# Patient Record
Sex: MALE | Race: BLACK OR AFRICAN AMERICAN | Employment: OTHER | URBAN - METROPOLITAN AREA
[De-identification: names, ages, dates, MRNs, and addresses within clinical notes are randomized per-mention and may not be internally consistent; named-entity substitution may affect disease eponyms.]

---

## 2017-01-08 DIAGNOSIS — K21.9 GASTROESOPHAGEAL REFLUX DISEASE WITHOUT ESOPHAGITIS: ICD-10-CM

## 2017-01-08 DIAGNOSIS — I10 ESSENTIAL HYPERTENSION: ICD-10-CM

## 2017-01-10 RX ORDER — QUINAPRIL 40 MG/1
TABLET ORAL
Qty: 30 TABLET | Refills: 4 | Status: SHIPPED | OUTPATIENT
Start: 2017-01-10 | End: 2018-12-24 | Stop reason: SDUPTHER

## 2017-01-10 RX ORDER — OMEPRAZOLE 20 MG/1
CAPSULE, DELAYED RELEASE ORAL
Qty: 30 CAPSULE | Refills: 4 | Status: SHIPPED | OUTPATIENT
Start: 2017-01-10 | End: 2018-10-30

## 2017-01-10 RX ORDER — AMLODIPINE BESYLATE 5 MG/1
TABLET ORAL
Qty: 30 TABLET | Refills: 4 | Status: SHIPPED | OUTPATIENT
Start: 2017-01-10 | End: 2018-12-24 | Stop reason: SDUPTHER

## 2017-02-07 RX ORDER — BLOOD SUGAR DIAGNOSTIC
STRIP MISCELLANEOUS
Qty: 50 STRIP | Status: SHIPPED | OUTPATIENT
Start: 2017-02-07 | End: 2019-02-13 | Stop reason: SDUPTHER

## 2017-02-11 DIAGNOSIS — E78.2 MIXED HYPERLIPIDEMIA: ICD-10-CM

## 2017-02-13 RX ORDER — SIMVASTATIN 20 MG
TABLET ORAL
Qty: 30 TABLET | Refills: 4 | Status: SHIPPED | OUTPATIENT
Start: 2017-02-13 | End: 2018-12-24 | Stop reason: SDUPTHER

## 2017-04-19 ENCOUNTER — HOSPITAL ENCOUNTER (OUTPATIENT)
Dept: OTHER | Age: 82
Discharge: HOME | End: 2017-04-19
Attending: INTERNAL MEDICINE | Admitting: INTERNAL MEDICINE

## 2017-04-19 LAB
ALBUMIN SERPL-MCNC: 3.8 GM/DL (ref 3.4–5)
ALP BLD-CCNC: 158 IU/L (ref 40–128)
ALT SERPL-CCNC: 10 U/L (ref 10–40)
ANION GAP SERPL CALCULATED.3IONS-SCNC: 15 MMOL/L (ref 4–16)
AST SERPL-CCNC: 20 IU/L (ref 15–37)
BILIRUB SERPL-MCNC: 0.2 MG/DL (ref 0–1)
BUN BLDV-MCNC: 15 MG/DL (ref 6–23)
CALCIUM SERPL-MCNC: 9.3 MG/DL (ref 8.3–10.6)
CEA: 3.8 NG/ML
CHLORIDE BLD-SCNC: 103 MMOL/L (ref 99–110)
CO2: 26 MMOL/L (ref 21–32)
CREAT SERPL-MCNC: 1.5 MG/DL (ref 0.9–1.3)
GFR AFRICAN AMERICAN: 54 ML/MIN/1.73M2
GFR NON-AFRICAN AMERICAN: 45 ML/MIN/1.73M2
GLUCOSE BLD-MCNC: 53 MG/DL (ref 70–140)
POTASSIUM SERPL-SCNC: 5 MMOL/L (ref 3.5–5.1)
SODIUM BLD-SCNC: 144 MMOL/L (ref 135–145)
TOTAL PROTEIN: 7.1 GM/DL (ref 6.4–8.2)

## 2017-05-12 ENCOUNTER — HOSPITAL ENCOUNTER (OUTPATIENT)
Dept: CT IMAGING | Age: 82
Discharge: HOME | End: 2017-05-12
Attending: RADIOLOGY | Admitting: RADIOLOGY

## 2017-05-12 DIAGNOSIS — C34.12 CANCER OF BRONCHUS OF LEFT UPPER LOBE (HCC): ICD-10-CM

## 2017-05-12 DIAGNOSIS — C34.12 MALIGNANT NEOPLASM OF UPPER LOBE, LEFT BRONCHUS OR LUNG (HCC): ICD-10-CM

## 2017-05-12 LAB
GFR AFRICAN AMERICAN: 54 ML/MIN/1.73M2
GFR NON-AFRICAN AMERICAN: 45 ML/MIN/1.73M2
POC CREATININE: 1.5 MG/DL (ref 0.9–1.3)

## 2017-05-12 RX ORDER — 0.9 % SODIUM CHLORIDE 0.9 %
10 VIAL (ML) INJECTION 2 TIMES DAILY
Status: DISCONTINUED | OUTPATIENT
Start: 2017-05-12 | End: 2017-05-13 | Stop reason: HOSPADM

## 2017-09-01 ENCOUNTER — HOSPITAL ENCOUNTER (OUTPATIENT)
Dept: OTHER | Age: 82
Discharge: HOME | End: 2017-09-01
Attending: INTERNAL MEDICINE | Admitting: INTERNAL MEDICINE

## 2017-09-01 LAB
ALBUMIN SERPL-MCNC: 3.9 GM/DL (ref 3.4–5)
ALP BLD-CCNC: 169 IU/L (ref 40–129)
ALT SERPL-CCNC: 11 U/L (ref 10–40)
ANION GAP SERPL CALCULATED.3IONS-SCNC: 11 MMOL/L (ref 4–16)
AST SERPL-CCNC: 17 IU/L (ref 15–37)
BILIRUB SERPL-MCNC: 0.4 MG/DL (ref 0–1)
BUN BLDV-MCNC: 13 MG/DL (ref 6–23)
CALCIUM SERPL-MCNC: 9.5 MG/DL (ref 8.3–10.6)
CHLORIDE BLD-SCNC: 97 MMOL/L (ref 99–110)
CO2: 29 MMOL/L (ref 21–32)
CREAT SERPL-MCNC: 1.4 MG/DL (ref 0.9–1.3)
GFR AFRICAN AMERICAN: 59 ML/MIN/1.73M2
GFR NON-AFRICAN AMERICAN: 49 ML/MIN/1.73M2
GLUCOSE BLD-MCNC: 201 MG/DL (ref 70–140)
LACTATE DEHYDROGENASE: 188 IU/L (ref 120–246)
POTASSIUM SERPL-SCNC: 5 MMOL/L (ref 3.5–5.1)
SODIUM BLD-SCNC: 137 MMOL/L (ref 135–145)
TOTAL PROTEIN: 7.4 GM/DL (ref 6.4–8.2)

## 2017-09-14 ENCOUNTER — HOSPITAL ENCOUNTER (OUTPATIENT)
Dept: CT IMAGING | Age: 82
Discharge: HOME | End: 2017-09-14
Attending: UROLOGY | Admitting: INTERNAL MEDICINE

## 2017-09-14 DIAGNOSIS — R05.9 COUGH: ICD-10-CM

## 2017-09-14 DIAGNOSIS — C34.12 MALIGNANT NEOPLASM OF UPPER LOBE, LEFT BRONCHUS OR LUNG (HCC): ICD-10-CM

## 2017-09-14 DIAGNOSIS — C34.12 CANCER OF BRONCHUS OF LEFT UPPER LOBE (HCC): ICD-10-CM

## 2017-09-14 RX ORDER — 0.9 % SODIUM CHLORIDE 0.9 %
10 VIAL (ML) INJECTION
Status: COMPLETED | OUTPATIENT
Start: 2017-09-14 | End: 2017-09-14

## 2017-09-14 RX ADMIN — Medication 10 ML: at 12:38

## 2017-11-13 ENCOUNTER — HOSPITAL ENCOUNTER (OUTPATIENT)
Dept: CT IMAGING | Age: 82
Discharge: HOME | End: 2017-11-13
Attending: RADIOLOGY | Admitting: RADIOLOGY

## 2017-11-13 DIAGNOSIS — C34.12 MALIGNANT NEOPLASM OF UPPER LOBE, LEFT BRONCHUS OR LUNG (HCC): ICD-10-CM

## 2017-11-13 DIAGNOSIS — C34.12 CANCER OF BRONCHUS OF LEFT UPPER LOBE (HCC): ICD-10-CM

## 2017-11-13 LAB
GFR AFRICAN AMERICAN: >60 ML/MIN/1.73M2
GFR NON-AFRICAN AMERICAN: 57 ML/MIN/1.73M2
POC CREATININE: 1.2 MG/DL (ref 0.9–1.3)

## 2017-11-13 RX ORDER — SODIUM CHLORIDE 0.9 % (FLUSH) 0.9 %
10 SYRINGE (ML) INJECTION ONCE
Status: COMPLETED | OUTPATIENT
Start: 2017-11-13 | End: 2017-11-13

## 2017-11-13 RX ADMIN — Medication 10 ML: at 14:55

## 2017-11-28 ENCOUNTER — HOSPITAL ENCOUNTER (OUTPATIENT)
Dept: OTHER | Age: 82
Discharge: HOME | End: 2017-11-28
Attending: INTERNAL MEDICINE | Admitting: INTERNAL MEDICINE

## 2017-11-28 LAB
ALBUMIN SERPL-MCNC: 3.6 GM/DL (ref 3.4–5)
ALP BLD-CCNC: 140 IU/L (ref 40–128)
ALT SERPL-CCNC: 9 U/L (ref 10–40)
ANION GAP SERPL CALCULATED.3IONS-SCNC: 9 MMOL/L (ref 4–16)
AST SERPL-CCNC: 13 IU/L (ref 15–37)
BILIRUB SERPL-MCNC: 0.4 MG/DL (ref 0–1)
BUN BLDV-MCNC: 16 MG/DL (ref 6–23)
CALCIUM SERPL-MCNC: 9 MG/DL (ref 8.3–10.6)
CHLORIDE BLD-SCNC: 101 MMOL/L (ref 99–110)
CO2: 30 MMOL/L (ref 21–32)
CREAT SERPL-MCNC: 1.5 MG/DL (ref 0.9–1.3)
GFR AFRICAN AMERICAN: 54 ML/MIN/1.73M2
GFR NON-AFRICAN AMERICAN: 45 ML/MIN/1.73M2
GLUCOSE BLD-MCNC: 163 MG/DL (ref 70–99)
POTASSIUM SERPL-SCNC: 4.4 MMOL/L (ref 3.5–5.1)
SODIUM BLD-SCNC: 140 MMOL/L (ref 135–145)
TOTAL PROTEIN: 6.7 GM/DL (ref 6.4–8.2)

## 2018-04-06 ENCOUNTER — HOSPITAL ENCOUNTER (OUTPATIENT)
Dept: GENERAL RADIOLOGY | Age: 83
Discharge: HOME | End: 2018-04-06
Attending: INTERNAL MEDICINE | Admitting: INTERNAL MEDICINE

## 2018-04-06 DIAGNOSIS — C34.90 NON-SMALL CELL LUNG CANCER, UNSPECIFIED LATERALITY (HCC): ICD-10-CM

## 2018-04-06 LAB
ALBUMIN SERPL-MCNC: 4 GM/DL (ref 3.4–5)
ALP BLD-CCNC: 158 IU/L (ref 40–129)
ALT SERPL-CCNC: 11 U/L (ref 10–40)
ANION GAP SERPL CALCULATED.3IONS-SCNC: 9 MMOL/L (ref 4–16)
AST SERPL-CCNC: 15 IU/L (ref 15–37)
BASOPHILS ABSOLUTE: 0.1 K/CU MM
BASOPHILS RELATIVE PERCENT: 1.1 % (ref 0–1)
BILIRUB SERPL-MCNC: 0.3 MG/DL (ref 0–1)
BUN BLDV-MCNC: 25 MG/DL (ref 6–23)
CALCIUM SERPL-MCNC: 9.5 MG/DL (ref 8.3–10.6)
CHLORIDE BLD-SCNC: 99 MMOL/L (ref 99–110)
CO2: 30 MMOL/L (ref 21–32)
CREAT SERPL-MCNC: 1.8 MG/DL (ref 0.9–1.3)
DIFFERENTIAL TYPE: ABNORMAL
EOSINOPHILS ABSOLUTE: 0.4 K/CU MM
EOSINOPHILS RELATIVE PERCENT: 6.9 % (ref 0–3)
GFR AFRICAN AMERICAN: 44 ML/MIN/1.73M2
GFR NON-AFRICAN AMERICAN: 36 ML/MIN/1.73M2
GLUCOSE FASTING: 217 MG/DL (ref 70–99)
HCT VFR BLD CALC: 45 % (ref 42–52)
HEMOGLOBIN: 14.4 GM/DL (ref 13.5–18)
IMMATURE NEUTROPHIL %: 0.5 % (ref 0–0.43)
LYMPHOCYTES ABSOLUTE: 1.2 K/CU MM
LYMPHOCYTES RELATIVE PERCENT: 18.3 % (ref 24–44)
MCH RBC QN AUTO: 28.4 PG (ref 27–31)
MCHC RBC AUTO-ENTMCNC: 32 % (ref 32–36)
MCV RBC AUTO: 88.8 FL (ref 78–100)
MONOCYTES ABSOLUTE: 0.8 K/CU MM
MONOCYTES RELATIVE PERCENT: 11.8 % (ref 0–4)
NUCLEATED RBC %: 0 %
PDW BLD-RTO: 15 % (ref 11.7–14.9)
PLATELET # BLD: 280 K/CU MM (ref 140–440)
PMV BLD AUTO: 10.2 FL (ref 7.5–11.1)
POTASSIUM SERPL-SCNC: 4.7 MMOL/L (ref 3.5–5.1)
RBC # BLD: 5.07 M/CU MM (ref 4.6–6.2)
SEGMENTED NEUTROPHILS ABSOLUTE COUNT: 3.9 K/CU MM
SEGMENTED NEUTROPHILS RELATIVE PERCENT: 61.4 % (ref 36–66)
SODIUM BLD-SCNC: 138 MMOL/L (ref 135–145)
TOTAL IMMATURE NEUTOROPHIL: 0.03 K/CU MM
TOTAL NUCLEATED RBC: 0 K/CU MM
TOTAL PROTEIN: 7.6 GM/DL (ref 6.4–8.2)
WBC # BLD: 6.3 K/CU MM (ref 4–10.5)

## 2018-05-10 ENCOUNTER — HOSPITAL ENCOUNTER (OUTPATIENT)
Dept: CT IMAGING | Age: 83
Discharge: HOME | End: 2018-05-10
Attending: RADIOLOGY | Admitting: RADIOLOGY

## 2018-05-10 DIAGNOSIS — C34.12 CANCER OF BRONCHUS OF LEFT UPPER LOBE (HCC): ICD-10-CM

## 2018-05-10 DIAGNOSIS — C34.12 MALIGNANT NEOPLASM OF UPPER LOBE, LEFT BRONCHUS OR LUNG (HCC): ICD-10-CM

## 2018-05-10 LAB
GFR AFRICAN AMERICAN: 28 ML/MIN/1.73M2
GFR NON-AFRICAN AMERICAN: 23 ML/MIN/1.73M2
POC CREATININE: 2.1 MG/DL (ref 0.9–1.3)

## 2018-10-30 ENCOUNTER — OFFICE VISIT (OUTPATIENT)
Dept: INTERNAL MEDICINE CLINIC | Age: 83
End: 2018-10-30
Payer: MEDICARE

## 2018-10-30 VITALS
RESPIRATION RATE: 18 BRPM | SYSTOLIC BLOOD PRESSURE: 118 MMHG | OXYGEN SATURATION: 97 % | WEIGHT: 147 LBS | DIASTOLIC BLOOD PRESSURE: 64 MMHG | BODY MASS INDEX: 22.35 KG/M2 | HEART RATE: 86 BPM

## 2018-10-30 DIAGNOSIS — C34.92 SQUAMOUS CELL CARCINOMA OF LEFT LUNG (HCC): ICD-10-CM

## 2018-10-30 DIAGNOSIS — E11.22 CONTROLLED TYPE 2 DIABETES MELLITUS WITH STAGE 3 CHRONIC KIDNEY DISEASE, WITH LONG-TERM CURRENT USE OF INSULIN (HCC): ICD-10-CM

## 2018-10-30 DIAGNOSIS — Z79.4 CONTROLLED TYPE 2 DIABETES MELLITUS WITH STAGE 3 CHRONIC KIDNEY DISEASE, WITH LONG-TERM CURRENT USE OF INSULIN (HCC): ICD-10-CM

## 2018-10-30 DIAGNOSIS — E78.00 HYPERCHOLESTEROLEMIA: ICD-10-CM

## 2018-10-30 DIAGNOSIS — N18.30 CONTROLLED TYPE 2 DIABETES MELLITUS WITH STAGE 3 CHRONIC KIDNEY DISEASE, WITH LONG-TERM CURRENT USE OF INSULIN (HCC): ICD-10-CM

## 2018-10-30 DIAGNOSIS — I10 ESSENTIAL HYPERTENSION: Primary | ICD-10-CM

## 2018-10-30 DIAGNOSIS — J43.9 PULMONARY EMPHYSEMA, UNSPECIFIED EMPHYSEMA TYPE (HCC): ICD-10-CM

## 2018-10-30 DIAGNOSIS — I35.0 NONRHEUMATIC AORTIC VALVE STENOSIS: ICD-10-CM

## 2018-10-30 PROCEDURE — 99205 OFFICE O/P NEW HI 60 MIN: CPT | Performed by: INTERNAL MEDICINE

## 2018-10-30 RX ORDER — HYDROCHLOROTHIAZIDE 12.5 MG/1
12.5 CAPSULE, GELATIN COATED ORAL DAILY
COMMUNITY
End: 2018-12-24 | Stop reason: SDUPTHER

## 2018-10-30 RX ORDER — BUDESONIDE AND FORMOTEROL FUMARATE DIHYDRATE 160; 4.5 UG/1; UG/1
2 AEROSOL RESPIRATORY (INHALATION) 2 TIMES DAILY
Qty: 1 INHALER | Refills: 3
Start: 2018-10-30 | End: 2019-04-18 | Stop reason: SDUPTHER

## 2018-10-30 RX ORDER — ALBUTEROL SULFATE 90 UG/1
2 AEROSOL, METERED RESPIRATORY (INHALATION) EVERY 6 HOURS PRN
Qty: 1 INHALER | Refills: 3 | Status: SHIPPED | OUTPATIENT
Start: 2018-10-30 | End: 2019-04-18 | Stop reason: SDUPTHER

## 2018-10-30 RX ORDER — SPIRONOLACTONE 25 MG/1
25 TABLET ORAL DAILY
COMMUNITY
End: 2018-12-24 | Stop reason: SDUPTHER

## 2018-10-30 ASSESSMENT — PATIENT HEALTH QUESTIONNAIRE - PHQ9
2. FEELING DOWN, DEPRESSED OR HOPELESS: 0
SUM OF ALL RESPONSES TO PHQ QUESTIONS 1-9: 0
SUM OF ALL RESPONSES TO PHQ9 QUESTIONS 1 & 2: 0
SUM OF ALL RESPONSES TO PHQ QUESTIONS 1-9: 0
1. LITTLE INTEREST OR PLEASURE IN DOING THINGS: 0

## 2018-11-05 DIAGNOSIS — E11.22 CONTROLLED TYPE 2 DIABETES MELLITUS WITH STAGE 3 CHRONIC KIDNEY DISEASE, WITH LONG-TERM CURRENT USE OF INSULIN (HCC): ICD-10-CM

## 2018-11-05 DIAGNOSIS — N18.30 CONTROLLED TYPE 2 DIABETES MELLITUS WITH STAGE 3 CHRONIC KIDNEY DISEASE, WITH LONG-TERM CURRENT USE OF INSULIN (HCC): ICD-10-CM

## 2018-11-05 DIAGNOSIS — I10 ESSENTIAL HYPERTENSION: ICD-10-CM

## 2018-11-05 DIAGNOSIS — E78.00 HYPERCHOLESTEROLEMIA: ICD-10-CM

## 2018-11-05 DIAGNOSIS — Z79.4 CONTROLLED TYPE 2 DIABETES MELLITUS WITH STAGE 3 CHRONIC KIDNEY DISEASE, WITH LONG-TERM CURRENT USE OF INSULIN (HCC): ICD-10-CM

## 2018-11-05 LAB
A/G RATIO: 1.1 (ref 1.1–2.2)
ALBUMIN SERPL-MCNC: 3.7 G/DL (ref 3.4–5)
ALP BLD-CCNC: 120 U/L (ref 40–129)
ALT SERPL-CCNC: 12 U/L (ref 10–40)
ANION GAP SERPL CALCULATED.3IONS-SCNC: 9 MMOL/L (ref 3–16)
AST SERPL-CCNC: 12 U/L (ref 15–37)
BILIRUB SERPL-MCNC: <0.2 MG/DL (ref 0–1)
BUN BLDV-MCNC: 19 MG/DL (ref 7–20)
CALCIUM SERPL-MCNC: 9.9 MG/DL (ref 8.3–10.6)
CHLORIDE BLD-SCNC: 102 MMOL/L (ref 99–110)
CHOLESTEROL, TOTAL: 121 MG/DL (ref 0–199)
CO2: 30 MMOL/L (ref 21–32)
CREAT SERPL-MCNC: 1.6 MG/DL (ref 0.8–1.3)
CREATININE URINE: 191 MG/DL (ref 39–259)
GFR AFRICAN AMERICAN: 50
GFR NON-AFRICAN AMERICAN: 41
GLOBULIN: 3.5 G/DL
GLUCOSE BLD-MCNC: 246 MG/DL (ref 70–99)
HDLC SERPL-MCNC: 55 MG/DL (ref 40–60)
LDL CHOLESTEROL CALCULATED: 50 MG/DL
MICROALBUMIN UR-MCNC: 31.1 MG/DL
MICROALBUMIN/CREAT UR-RTO: 162.8 MG/G (ref 0–30)
POTASSIUM SERPL-SCNC: 4.9 MMOL/L (ref 3.5–5.1)
SODIUM BLD-SCNC: 141 MMOL/L (ref 136–145)
TOTAL PROTEIN: 7.2 G/DL (ref 6.4–8.2)
TRIGL SERPL-MCNC: 82 MG/DL (ref 0–150)
VLDLC SERPL CALC-MCNC: 16 MG/DL

## 2018-11-06 LAB
BASOPHILS ABSOLUTE: 0 K/UL (ref 0–0.2)
BASOPHILS RELATIVE PERCENT: 0.3 %
EOSINOPHILS ABSOLUTE: 0.1 K/UL (ref 0–0.6)
EOSINOPHILS RELATIVE PERCENT: 2.8 %
ESTIMATED AVERAGE GLUCOSE: 182.9 MG/DL
HBA1C MFR BLD: 8 %
HCT VFR BLD CALC: 45.2 % (ref 40.5–52.5)
HEMOGLOBIN: 14.8 G/DL (ref 13.5–17.5)
LYMPHOCYTES ABSOLUTE: 0.9 K/UL (ref 1–5.1)
LYMPHOCYTES RELATIVE PERCENT: 21.8 %
MCH RBC QN AUTO: 29.8 PG (ref 26–34)
MCHC RBC AUTO-ENTMCNC: 32.7 G/DL (ref 31–36)
MCV RBC AUTO: 90.9 FL (ref 80–100)
MONOCYTES ABSOLUTE: 0.5 K/UL (ref 0–1.3)
MONOCYTES RELATIVE PERCENT: 10.5 %
NEUTROPHILS ABSOLUTE: 2.8 K/UL (ref 1.7–7.7)
NEUTROPHILS RELATIVE PERCENT: 64.6 %
PDW BLD-RTO: 15 % (ref 12.4–15.4)
PLATELET # BLD: 341 K/UL (ref 135–450)
PMV BLD AUTO: 8.8 FL (ref 5–10.5)
RBC # BLD: 4.97 M/UL (ref 4.2–5.9)
WBC # BLD: 4.3 K/UL (ref 4–11)

## 2018-11-09 ENCOUNTER — HOSPITAL ENCOUNTER (INPATIENT)
Age: 83
LOS: 2 days | Discharge: HOME | DRG: 178 | End: 2018-11-12
Attending: EMERGENCY MEDICINE | Admitting: HOSPITALIST
Payer: MEDICARE

## 2018-11-09 ENCOUNTER — HOSPITAL ENCOUNTER (OUTPATIENT)
Dept: CT IMAGING | Age: 83
Discharge: HOME | DRG: 178 | End: 2018-11-09
Payer: MEDICARE

## 2018-11-09 DIAGNOSIS — C34.01 ADENOCARCINOMA OF RIGHT MAIN BRONCHUS (HCC): ICD-10-CM

## 2018-11-09 DIAGNOSIS — J18.9 MULTIFOCAL PNEUMONIA: Primary | ICD-10-CM

## 2018-11-09 DIAGNOSIS — I51.3 ATRIAL THROMBUS: ICD-10-CM

## 2018-11-09 LAB
ALBUMIN SERPL-MCNC: 3.3 GM/DL (ref 3.4–5)
ALP BLD-CCNC: 118 IU/L (ref 40–128)
ALT SERPL-CCNC: 12 U/L (ref 10–40)
ANION GAP SERPL CALCULATED.3IONS-SCNC: 7 MMOL/L (ref 4–16)
APTT: 36.1 SECONDS (ref 21.2–33)
AST SERPL-CCNC: 14 IU/L (ref 15–37)
BASOPHILS ABSOLUTE: 0 K/CU MM
BASOPHILS RELATIVE PERCENT: 0.3 % (ref 0–1)
BILIRUB SERPL-MCNC: 0.2 MG/DL (ref 0–1)
BUN BLDV-MCNC: 22 MG/DL (ref 6–23)
CALCIUM SERPL-MCNC: 9 MG/DL (ref 8.3–10.6)
CHLORIDE BLD-SCNC: 102 MMOL/L (ref 99–110)
CO2: 28 MMOL/L (ref 21–32)
CREAT SERPL-MCNC: 1.8 MG/DL (ref 0.9–1.3)
DIFFERENTIAL TYPE: ABNORMAL
EOSINOPHILS ABSOLUTE: 0.1 K/CU MM
EOSINOPHILS RELATIVE PERCENT: 1.1 % (ref 0–3)
GFR AFRICAN AMERICAN: 44 ML/MIN/1.73M2
GFR NON-AFRICAN AMERICAN: 36 ML/MIN/1.73M2
GLUCOSE BLD-MCNC: 225 MG/DL (ref 70–99)
HCT VFR BLD CALC: 42.6 % (ref 42–52)
HEMOGLOBIN: 13.7 GM/DL (ref 13.5–18)
IMMATURE NEUTROPHIL %: 0.2 % (ref 0–0.43)
INR BLD: 1.06 INDEX
LACTATE: 0.8 MMOL/L (ref 0.4–2)
LYMPHOCYTES ABSOLUTE: 1 K/CU MM
LYMPHOCYTES RELATIVE PERCENT: 11.8 % (ref 24–44)
MCH RBC QN AUTO: 29.5 PG (ref 27–31)
MCHC RBC AUTO-ENTMCNC: 32.2 % (ref 32–36)
MCV RBC AUTO: 91.8 FL (ref 78–100)
MONOCYTES ABSOLUTE: 0.7 K/CU MM
MONOCYTES RELATIVE PERCENT: 8.3 % (ref 0–4)
NUCLEATED RBC %: 0 %
PDW BLD-RTO: 14.8 % (ref 11.7–14.9)
PLATELET # BLD: 273 K/CU MM (ref 140–440)
PMV BLD AUTO: 9.6 FL (ref 7.5–11.1)
POTASSIUM SERPL-SCNC: 4.6 MMOL/L (ref 3.5–5.1)
PRO-BNP: 345 PG/ML
PROTHROMBIN TIME: 12.3 SECONDS (ref 9.12–12.5)
RBC # BLD: 4.64 M/CU MM (ref 4.6–6.2)
SEGMENTED NEUTROPHILS ABSOLUTE COUNT: 6.9 K/CU MM
SEGMENTED NEUTROPHILS RELATIVE PERCENT: 78.3 % (ref 36–66)
SODIUM BLD-SCNC: 137 MMOL/L (ref 135–145)
TOTAL IMMATURE NEUTOROPHIL: 0.02 K/CU MM
TOTAL NUCLEATED RBC: 0 K/CU MM
TOTAL PROTEIN: 7.1 GM/DL (ref 6.4–8.2)
TROPONIN T: <0.01 NG/ML
WBC # BLD: 8.8 K/CU MM (ref 4–10.5)

## 2018-11-09 PROCEDURE — 2580000003 HC RX 258: Performed by: PHYSICIAN ASSISTANT

## 2018-11-09 PROCEDURE — 6360000002 HC RX W HCPCS: Performed by: PHYSICIAN ASSISTANT

## 2018-11-09 PROCEDURE — 93005 ELECTROCARDIOGRAM TRACING: CPT | Performed by: EMERGENCY MEDICINE

## 2018-11-09 PROCEDURE — 36415 COLL VENOUS BLD VENIPUNCTURE: CPT

## 2018-11-09 PROCEDURE — 99284 EMERGENCY DEPT VISIT MOD MDM: CPT

## 2018-11-09 PROCEDURE — 80053 COMPREHEN METABOLIC PANEL: CPT

## 2018-11-09 PROCEDURE — 71260 CT THORAX DX C+: CPT

## 2018-11-09 PROCEDURE — 87040 BLOOD CULTURE FOR BACTERIA: CPT

## 2018-11-09 PROCEDURE — 85730 THROMBOPLASTIN TIME PARTIAL: CPT

## 2018-11-09 PROCEDURE — 84484 ASSAY OF TROPONIN QUANT: CPT

## 2018-11-09 PROCEDURE — 85025 COMPLETE CBC W/AUTO DIFF WBC: CPT

## 2018-11-09 PROCEDURE — 83880 ASSAY OF NATRIURETIC PEPTIDE: CPT

## 2018-11-09 PROCEDURE — 83605 ASSAY OF LACTIC ACID: CPT

## 2018-11-09 PROCEDURE — 6360000004 HC RX CONTRAST MEDICATION: Performed by: INTERNAL MEDICINE

## 2018-11-09 PROCEDURE — 85610 PROTHROMBIN TIME: CPT

## 2018-11-09 RX ORDER — VANCOMYCIN HYDROCHLORIDE 1 G/200ML
1000 INJECTION, SOLUTION INTRAVENOUS ONCE
Status: DISCONTINUED | OUTPATIENT
Start: 2018-11-09 | End: 2018-11-09 | Stop reason: DRUGHIGH

## 2018-11-09 RX ORDER — HEPARIN SODIUM 1000 [USP'U]/ML
80 INJECTION, SOLUTION INTRAVENOUS; SUBCUTANEOUS ONCE
Status: DISCONTINUED | OUTPATIENT
Start: 2018-11-09 | End: 2018-11-09 | Stop reason: DRUGHIGH

## 2018-11-09 RX ORDER — HEPARIN SODIUM 10000 [USP'U]/100ML
18 INJECTION, SOLUTION INTRAVENOUS CONTINUOUS
Status: DISCONTINUED | OUTPATIENT
Start: 2018-11-09 | End: 2018-11-10

## 2018-11-09 RX ORDER — HEPARIN SODIUM 1000 [USP'U]/ML
80 INJECTION, SOLUTION INTRAVENOUS; SUBCUTANEOUS PRN
Status: DISCONTINUED | OUTPATIENT
Start: 2018-11-10 | End: 2018-11-11

## 2018-11-09 RX ORDER — HEPARIN SODIUM 1000 [USP'U]/ML
40 INJECTION, SOLUTION INTRAVENOUS; SUBCUTANEOUS PRN
Status: DISCONTINUED | OUTPATIENT
Start: 2018-11-10 | End: 2018-11-11

## 2018-11-09 RX ADMIN — VANCOMYCIN HYDROCHLORIDE 1250 MG: 5 INJECTION, POWDER, LYOPHILIZED, FOR SOLUTION INTRAVENOUS at 22:59

## 2018-11-09 RX ADMIN — IOPAMIDOL 75 ML: 755 INJECTION, SOLUTION INTRAVENOUS at 15:18

## 2018-11-09 NOTE — ED TRIAGE NOTES
Pt had syncopal episode about 2 weeks ago and Dr Sarmad Duffy sent him for blood work and and out pt CT scan, pt got a call today from the  and was told to come to the ER for a blood clot in his lung

## 2018-11-10 PROBLEM — J18.9 PNEUMONIA: Status: ACTIVE | Noted: 2018-11-10

## 2018-11-10 LAB
ADENOVIRUS DETECTION BY PCR: NOT DETECTED
ANION GAP SERPL CALCULATED.3IONS-SCNC: 9 MMOL/L (ref 4–16)
APTT: 100.7 SECONDS (ref 21.2–33)
APTT: 52.1 SECONDS (ref 21.2–33)
APTT: 81.9 SECONDS (ref 21.2–33)
BORDETELLA PERTUSSIS PCR: NOT DETECTED
BUN BLDV-MCNC: 21 MG/DL (ref 6–23)
CALCIUM SERPL-MCNC: 8.9 MG/DL (ref 8.3–10.6)
CHLAMYDOPHILA PNEUMONIA PCR: NOT DETECTED
CHLORIDE BLD-SCNC: 101 MMOL/L (ref 99–110)
CO2: 27 MMOL/L (ref 21–32)
CORONAVIRUS 229E PCR: NOT DETECTED
CORONAVIRUS HKU1 PCR: NOT DETECTED
CORONAVIRUS NL63 PCR: NOT DETECTED
CORONAVIRUS OC43 PCR: NOT DETECTED
CREAT SERPL-MCNC: 1.6 MG/DL (ref 0.9–1.3)
GFR AFRICAN AMERICAN: 50 ML/MIN/1.73M2
GFR NON-AFRICAN AMERICAN: 41 ML/MIN/1.73M2
GLUCOSE BLD-MCNC: 177 MG/DL (ref 70–99)
HCT VFR BLD CALC: 42.4 % (ref 42–52)
HEMOGLOBIN: 13.8 GM/DL (ref 13.5–18)
HUMAN METAPNEUMOVIRUS PCR: NOT DETECTED
INFLUENZA A BY PCR: NOT DETECTED
INFLUENZA A H1 (2009) PCR: NOT DETECTED
INFLUENZA A H1 PANDEMIC PCR: NOT DETECTED
INFLUENZA A H3 PCR: NOT DETECTED
INFLUENZA B BY PCR: NOT DETECTED
LV EF: 53 %
LVEF MODALITY: NORMAL
MCH RBC QN AUTO: 29.5 PG (ref 27–31)
MCHC RBC AUTO-ENTMCNC: 32.5 % (ref 32–36)
MCV RBC AUTO: 90.6 FL (ref 78–100)
MYCOPLASMA PNEUMONIAE PCR: NOT DETECTED
PARAINFLUENZA 1 PCR: NOT DETECTED
PARAINFLUENZA 2 PCR: NOT DETECTED
PARAINFLUENZA 3 PCR: NOT DETECTED
PARAINFLUENZA 4 PCR: NOT DETECTED
PDW BLD-RTO: 14.8 % (ref 11.7–14.9)
PLATELET # BLD: 275 K/CU MM (ref 140–440)
PMV BLD AUTO: 10.3 FL (ref 7.5–11.1)
POTASSIUM SERPL-SCNC: 4.4 MMOL/L (ref 3.5–5.1)
RBC # BLD: 4.68 M/CU MM (ref 4.6–6.2)
RHINOVIRUS ENTEROVIRUS PCR: NOT DETECTED
RSV PCR: NOT DETECTED
SODIUM BLD-SCNC: 137 MMOL/L (ref 135–145)
WBC # BLD: 8.4 K/CU MM (ref 4–10.5)

## 2018-11-10 PROCEDURE — 6360000002 HC RX W HCPCS: Performed by: INTERNAL MEDICINE

## 2018-11-10 PROCEDURE — 80048 BASIC METABOLIC PNL TOTAL CA: CPT

## 2018-11-10 PROCEDURE — 85027 COMPLETE CBC AUTOMATED: CPT

## 2018-11-10 PROCEDURE — 2500000003 HC RX 250 WO HCPCS: Performed by: HOSPITALIST

## 2018-11-10 PROCEDURE — 1200000000 HC SEMI PRIVATE

## 2018-11-10 PROCEDURE — 6360000002 HC RX W HCPCS: Performed by: HOSPITALIST

## 2018-11-10 PROCEDURE — 94640 AIRWAY INHALATION TREATMENT: CPT

## 2018-11-10 PROCEDURE — 6360000002 HC RX W HCPCS: Performed by: EMERGENCY MEDICINE

## 2018-11-10 PROCEDURE — 85730 THROMBOPLASTIN TIME PARTIAL: CPT

## 2018-11-10 PROCEDURE — 2580000003 HC RX 258: Performed by: HOSPITALIST

## 2018-11-10 PROCEDURE — 99221 1ST HOSP IP/OBS SF/LOW 40: CPT | Performed by: INTERNAL MEDICINE

## 2018-11-10 PROCEDURE — 93010 ELECTROCARDIOGRAM REPORT: CPT | Performed by: INTERNAL MEDICINE

## 2018-11-10 PROCEDURE — 94664 DEMO&/EVAL PT USE INHALER: CPT

## 2018-11-10 PROCEDURE — 87798 DETECT AGENT NOS DNA AMP: CPT

## 2018-11-10 PROCEDURE — 94761 N-INVAS EAR/PLS OXIMETRY MLT: CPT

## 2018-11-10 PROCEDURE — 36415 COLL VENOUS BLD VENIPUNCTURE: CPT

## 2018-11-10 PROCEDURE — 6370000000 HC RX 637 (ALT 250 FOR IP): Performed by: HOSPITALIST

## 2018-11-10 PROCEDURE — 93306 TTE W/DOPPLER COMPLETE: CPT

## 2018-11-10 RX ORDER — ONDANSETRON 2 MG/ML
4 INJECTION INTRAMUSCULAR; INTRAVENOUS EVERY 6 HOURS PRN
Status: DISCONTINUED | OUTPATIENT
Start: 2018-11-10 | End: 2018-11-12 | Stop reason: HOSPADM

## 2018-11-10 RX ORDER — SODIUM CHLORIDE 0.9 % (FLUSH) 0.9 %
10 SYRINGE (ML) INJECTION PRN
Status: DISCONTINUED | OUTPATIENT
Start: 2018-11-10 | End: 2018-11-12 | Stop reason: HOSPADM

## 2018-11-10 RX ORDER — LISINOPRIL 2.5 MG/1
2.5 TABLET ORAL DAILY
Status: DISCONTINUED | OUTPATIENT
Start: 2018-11-10 | End: 2018-11-12 | Stop reason: HOSPADM

## 2018-11-10 RX ORDER — SPIRONOLACTONE 25 MG/1
25 TABLET ORAL DAILY
Status: DISCONTINUED | OUTPATIENT
Start: 2018-11-10 | End: 2018-11-12 | Stop reason: HOSPADM

## 2018-11-10 RX ORDER — HYDROCHLOROTHIAZIDE 12.5 MG/1
12.5 TABLET ORAL DAILY
Status: DISCONTINUED | OUTPATIENT
Start: 2018-11-10 | End: 2018-11-12 | Stop reason: HOSPADM

## 2018-11-10 RX ORDER — AMLODIPINE BESYLATE 5 MG/1
5 TABLET ORAL DAILY
Status: DISCONTINUED | OUTPATIENT
Start: 2018-11-10 | End: 2018-11-12 | Stop reason: HOSPADM

## 2018-11-10 RX ORDER — SIMVASTATIN 20 MG
20 TABLET ORAL NIGHTLY
Status: DISCONTINUED | OUTPATIENT
Start: 2018-11-10 | End: 2018-11-12 | Stop reason: HOSPADM

## 2018-11-10 RX ORDER — HEPARIN SODIUM 5000 [USP'U]/ML
5000 INJECTION, SOLUTION INTRAVENOUS; SUBCUTANEOUS EVERY 8 HOURS SCHEDULED
Status: DISCONTINUED | OUTPATIENT
Start: 2018-11-10 | End: 2018-11-12 | Stop reason: HOSPADM

## 2018-11-10 RX ORDER — SODIUM CHLORIDE 0.9 % (FLUSH) 0.9 %
10 SYRINGE (ML) INJECTION EVERY 12 HOURS SCHEDULED
Status: DISCONTINUED | OUTPATIENT
Start: 2018-11-10 | End: 2018-11-12 | Stop reason: HOSPADM

## 2018-11-10 RX ADMIN — SODIUM CHLORIDE, PRESERVATIVE FREE 10 ML: 5 INJECTION INTRAVENOUS at 20:57

## 2018-11-10 RX ADMIN — SIMVASTATIN 20 MG: 20 TABLET, FILM COATED ORAL at 20:57

## 2018-11-10 RX ADMIN — SPIRONOLACTONE 25 MG: 25 TABLET ORAL at 08:15

## 2018-11-10 RX ADMIN — HEPARIN SODIUM 5000 UNITS: 5000 INJECTION INTRAVENOUS; SUBCUTANEOUS at 17:30

## 2018-11-10 RX ADMIN — VANCOMYCIN HYDROCHLORIDE 750 MG: 5 INJECTION, POWDER, LYOPHILIZED, FOR SOLUTION INTRAVENOUS at 21:02

## 2018-11-10 RX ADMIN — HEPARIN SODIUM AND DEXTROSE 18 UNITS/KG/HR: 10000; 5 INJECTION INTRAVENOUS at 00:05

## 2018-11-10 RX ADMIN — TAZOBACTAM SODIUM AND PIPERACILLIN SODIUM 3.38 G: 375; 3 INJECTION, SOLUTION INTRAVENOUS at 20:57

## 2018-11-10 RX ADMIN — TAZOBACTAM SODIUM AND PIPERACILLIN SODIUM 3.38 G: 375; 3 INJECTION, SOLUTION INTRAVENOUS at 13:43

## 2018-11-10 RX ADMIN — TAZOBACTAM SODIUM AND PIPERACILLIN SODIUM 3.38 G: 375; 3 INJECTION, SOLUTION INTRAVENOUS at 04:43

## 2018-11-10 RX ADMIN — SODIUM CHLORIDE, PRESERVATIVE FREE 10 ML: 5 INJECTION INTRAVENOUS at 08:16

## 2018-11-10 RX ADMIN — AMLODIPINE BESYLATE 5 MG: 5 TABLET ORAL at 08:15

## 2018-11-10 RX ADMIN — LISINOPRIL 2.5 MG: 2.5 TABLET ORAL at 08:16

## 2018-11-10 RX ADMIN — Medication 2 PUFF: at 09:02

## 2018-11-10 RX ADMIN — HYDROCHLOROTHIAZIDE 12.5 MG: 12.5 TABLET ORAL at 08:15

## 2018-11-10 NOTE — CONSULTS
exertional chest pressure/discomfort, edema, syncope  GASTROINTESTINAL:  negative for nausea, vomiting, diarrhea, constipation, blood in stool and abdominal pain  GENITOURINARY:  negative for frequency, dysuria, urinary incontinence, decreased urine volume, and hematuria  HEMATOLOGIC/LYMPHATIC:  negative for easy bruising, bleeding and lymphadenopathy  ALLERGIC/IMMUNOLOGIC:  negative for recurrent infections, angioedema, anaphylaxis and drug reactions  ENDOCRINE:  negative for weight changes and diabetic symptoms including polyuria, polydipsia and polyphagia  MUSCULOSKELETAL:  negative for  pain, joint swelling, decreased range of motion and muscle weakness  NEUROLOGICAL:  negative for headaches, slurred speech, unilateral weakness  PSYCHIATRIC/BEHAVIORAL: negative for hallucinations, behavioral problems, confusion and agitation. Objective:   PHYSICAL EXAM:      VITALS:  /72   Pulse 70   Temp 98 °F (36.7 °C) (Oral)   Resp 22   Ht 5' 8\" (1.727 m)   Wt 137 lb (62.1 kg)   SpO2 94%   BMI 20.83 kg/m²   24HR INTAKE/OUTPUT:    Intake/Output Summary (Last 24 hours) at 11/10/18 1611  Last data filed at 11/10/18 0753   Gross per 24 hour   Intake            96.59 ml   Output              550 ml   Net          -453.41 ml     CURRENT PULSE OXIMETRY:  SpO2: 94 %  24HR PULSE OXIMETRY RANGE:  SpO2  Av.4 %  Min: 94 %  Max: 99 %    CONSTITUTIONAL:  awake, alert, cooperative, no apparent distress, and appears stated age  NECK:  Supple, symmetrical, trachea midline, no adenopathy, thyroid symmetric, not enlarged and no tenderness, skin normal  LUNGS:  Occasional basal crackles  CARDIOVASCULAR:  normal S1 and S2, no edema and no JVD  ABDOMEN:  normal bowel sounds, non-distended and no masses palpated, and no tenderness to palpation. No hepatospleenomegaly  LYMPHADENOPATHY:  no axillary or supraclavicular adenopathy. No cervical adnenopathy  PSYCHIATRIC: Oriented to person place and time.  No obvious depression or

## 2018-11-10 NOTE — ED NOTES
[place], lactic, venous PH, and first set of cultures drawn on PT. SECOND SET NEEDS DRAWN!!      Gianni Mcgill  11/09/18 2042

## 2018-11-10 NOTE — PROGRESS NOTES
Progress Note (Hospitalist, Internal Medicine)  IDENTIFYING INFORMATION   PATIENT:  Fely Rincon  MRN:  [id number]  ADMIT DATE: 11/9/2018  TIME OF EVALUATION: 11/11/2018 8:26 AM      HISTORY OF PRESENT ILLNESS   Patient is an 51-year-old male history of lung cancer status post left-sided lobectomy was sent for outpatient CT by his oncologist during when it was noted he had an atrial thrombus and multifocal pneumonia which he presents to the ER. He reports progressively worsening dyspnea and cough productive of blood stained phlegm. He had no chills or fever. He had no chest pain. For the atrial thrombus, he was started on anticoagulation with IV heparin. For his pneumonia, he was started on both spectrum antibiotics for healthcare associated pneumonia.       SUBJECTIVE     He is feeling fair. Eating lunch w/o issues    MEDICATIONS   Medications Prior to Admission  Prescriptions Prior to Admission: hydrochlorothiazide (MICROZIDE) 12.5 MG capsule, Take 12.5 mg by mouth daily  spironolactone (ALDACTONE) 25 MG tablet, Take 25 mg by mouth daily  budesonide-formoterol (SYMBICORT) 160-4.5 MCG/ACT AERO, Inhale 2 puffs into the lungs 2 times daily  albuterol sulfate HFA (PROAIR HFA) 108 (90 Base) MCG/ACT inhaler, Inhale 2 puffs into the lungs every 6 hours as needed for Wheezing  simvastatin (ZOCOR) 20 MG tablet, TAKE ONE TABLET BY MOUTH DAILY  ACCU-CHEK FRANKY PLUS strip, USE ONE STRIP TO TEST TWICE A DAY  quinapril (ACCUPRIL) 40 MG tablet, TAKE ONE TABLET BY MOUTH EVERY MORNING  amLODIPine (NORVASC) 5 MG tablet, TAKE ONE TABLET BY MOUTH EVERY MORNING  insulin lispro protamine & lispro (HUMALOG MIX 75/25) (75-25) 100 UNIT/ML SUSP injection vial, Use 22 units in the morning with breakfast and 18 units in the evening with dinner.     Current Medications  Current Facility-Administered Medications   Medication Dose Route Frequency Provider Last Rate Last Dose    azithromycin (ZITHROMAX) 500 mg in dextrose 5 % 250 mL adjustment of the   mA/kV was utilized to reduce the radiation dose to as low as reasonably   achievable.       COMPARISON:   Chest CTs dated 05/10/2018, 11/13/2017, and 12/04/2015; thyroid sonogram   02/08/2016       HISTORY:   ORDERING SYSTEM PROVIDED HISTORY: Adenocarcinoma of right main bronchus (HCC)       Subsequent evaluation.       FINDINGS:   MEDIASTINUM:  Normal heart size.  Mild right ventricular hypertrophy.  Mild   to moderate concentric left ventricle hypertrophy.  Filling defect in the   left atrial appendage.  No pericardial effusion.  Minimal coronary   atherosclerotic calcifications.  Minimal systemic atherosclerosis.  Unchanged   mildly enlarged subcarinal lymph node, likely reactive.  No hilar   lymphadenopathy.       LUNGS/PLEURA:  Status post left upper lobectomy.  Secretions in the proximal   right mainstem bronchus and multiple bilateral lower lobe bronchi.  Minimal   to mild bronchial wall thickening with some moderate involvement in the   bilateral lower lobes.  Minimal centrilobular emphysema.  Unchanged 0.5 cm   noncalcified solid nodule the posterior segment of the right upper lobe,   consistent with a benign process such as granulomatous disease given   long-term stability.  Tree-in-bud opacities in the bilateral lower lobes with   new peribronchovascular nodular consolidative opacities.  Minimal gravity   atelectasis in the bilateral lower lobes.  No pleural effusions.       UPPER ABDOMEN:  Normal adrenal glands.  2.0 cm simple cyst in the medial   upper pole of the right kidney (Bosniak category 1).  Tiny sliding hiatal   hernia.       SOFT TISSUES/BONES:   Mildly enlarged, heterogeneous thyroid containing   nodules measuring up to 2.9 cm, previously evaluated sonographically.  No   supraclavicular nor axillary lymphadenopathy.  Normal variant left os   acromiale.  No acute fractures nor suspicious osseous lesions.           Impression   1.  Status post left upper lobectomy with no findings of disease recurrence or   metastatic disease in the chest.   2. Increased infectious bronchiolitis versus aspiration pneumonitis in the   bilateral lower lobes with developing multifocal pneumonia. 3. Patchy bronchial secretions in the bilateral lower lobes with minimal to   moderate bronchial wall thickening, suggesting bronchitis. 4. New filling defect in the left atrial appendage, likely thrombus.  This   could represent a risk for systemic thromboembolism.  Critical results were   called by Dr. Simin Ruvalcaba MD to Dr. Ron Lugo on 11/9/2018 at 16:54.   5. A few additional incidental findings as above for which no follow-up is   recommended.       RECOMMENDATIONS:   Managing Incidental Thyroid Nodule Detected at CT or MRI or US       1.  Further evaluation by thyroid Ultrasound recommended for these incidental   nodules:       Patient Age 28 years or more - Nodule 1.5 cm in size or greater       2. Follow up thyroid ultrasound also recommend in these scenarios       - Heterogeneous, enlarged thyroid gland.       3.  NO further imaging is recommended in the following scenarios       - Any nodule not meeting above criteria.       - Those patients with limited life expectancy or significant comorbidities.       Note: These recommendations do not apply to pts. w/ increased risk for   thyroid cancer or pts. with symptomatic thyroid disease.       Recommendations for f/u of Incidental Thyroid Nodules (ITN) found on CT, MR,   NM and Extrathyroidal US are based upon the ACR white paper and [facility] 3-tiered   system for managing ITNs: J Am David Radiol.  2015 Feb;12(2): 143-50                 Relevant labs and imaging reviewed    ASSESSMENT AND PLAN       Question atrial thrombus  Suspected on CT chest  - card assisting  - TTE no significant findings  - pending ALEX  - now on empiric heparin gtt with APTT monitoring    Bronchiolitis, multi-focal PNA - likely both gram negative and positive  - on admission started on

## 2018-11-10 NOTE — ED PROVIDER NOTES
I independently examined and evaluated Dl Smith. In brief, etiology male who presents with concern for abnormal CT. This was reportedly ordered for occasional dyspnea and recently a cough productive of phlegm with occasional blood. No fever or chills. No reported chest pain. Focused exam revealed the patient appears well-hydrated, well-nourished. Mucous membranes are moist. Speech is clear. Breathing is unlabored. Lungs with occasional rhonchi. Skin is dry. Mental status is normal. The patient moves all extremities and is without facial droop. EKG shows sinus rhythm at 78. Left bundle-branch block noted. Similar morphology from prior tracing. ED course: Patient imaging is reviewed. Labs are stable today. Given the findings on CT, patient is covered for suspected pneumonia. Due to the presence of suspected atrial thrombus, I discussed with the patient risks and benefits of proceeding with anticoagulation until echo can be obtained today for definitive diagnosis. He is agreeable to proceed, and heparin is ordered in the emergency department. We'll plan for admission. All diagnostic, treatment, and disposition decisions were made by myself in conjunction with the advanced practice provider. For all further details of the patient's emergency department visit, please see the advanced practice provider's documentation. Comment: Please note this report has been produced using speech recognition software and may contain errors related to that system including errors in grammar, punctuation, and spelling, as well as words and phrases that may be inappropriate. If there are any questions or concerns please feel free to contact the dictating provider for clarification.        Robles Hooks,   11/09/18 4821

## 2018-11-10 NOTE — CONSULTS
Saurabh Null MD   10 mL at 11/10/18 0816    sodium chloride flush 0.9 % injection 10 mL  10 mL Intravenous PRN Saurabh Null MD        magnesium hydroxide (MILK OF MAGNESIA) 400 MG/5ML suspension 30 mL  30 mL Oral Daily PRN Saurabh Null MD        ondansetron (ZOFRAN) injection 4 mg  4 mg Intravenous Q6H PRN Saurabh Null MD        vancomycin (VANCOCIN) 750 mg in dextrose 5 % 250 mL IVPB  750 mg Intravenous Q24H Saurabh Null MD        piperacillin-tazobactam (ZOSYN) 3.375 g in dextrose 50 mL IVPB extended infusion (premix)  3.375 g Intravenous Q8H Saurabh Null MD   Stopped at 11/10/18 0843    heparin (porcine) injection 5,150 Units  80 Units/kg Intravenous PRN Asael Hood, DO        heparin (porcine) injection 2,580 Units  40 Units/kg Intravenous PRN Asael Hood, DO        heparin 25,000 units in dextrose 5% 250 mL infusion  18 Units/kg/hr Intravenous Continuous Asael Hood,  11.6 mL/hr at 11/10/18 0606 18 Units/kg/hr at 11/10/18 0606     Review of Systems:   · Constitutional: No Fever or Weight Loss   · Eyes: No Decreased Vision  · ENT: No Headaches, Hearing Loss or Vertigo  · Cardiovascular: No chest pain, dyspnea on exertion, palpitations or loss of consciousness  · Respiratory: No cough or wheezing    · Gastrointestinal: No abdominal pain, appetite loss, blood in stools, constipation, diarrhea or heartburn  · Genitourinary: No dysuria, trouble voiding, or hematuria  · Musculoskeletal:  No gait disturbance, weakness or joint complaints  · Integumentary: No rash or pruritis  · Neurological: No TIA or stroke symptoms  · Psychiatric: No anxiety or depression  · Endocrine: No malaise, fatigue or temperature intolerance  · Hematologic/Lymphatic: No bleeding problems, blood clots or swollen lymph nodes  · Allergic/Immunologic: No nasal congestion or hives  ·   ·      Physical Examination:    Vitals:    11/10/18 0445   BP: (!) 142/76   Pulse: 66   Resp: 26   Temp: 98.1 °F (36.7 °C)   SpO2: 95% thrombus        Relevant Medications    heparin (porcine) injection 5,150 Units    heparin (porcine) injection 2,580 Units (Start on 11/10/2018  8:00 PM)    heparin 25,000 units in dextrose 5% 250 mL infusion    amLODIPine (NORVASC) tablet 5 mg    hydrochlorothiazide (HYDRODIURIL) tablet 12.5 mg    lisinopril (PRINIVIL;ZESTRIL) tablet 2.5 mg    simvastatin (ZOCOR) tablet 20 mg (Start on 11/10/2018  9:00 PM)    spironolactone (ALDACTONE) tablet 25 mg      1   Atrial  Thrombus    With  No  documented  Atrial  arrhythmia      Will  Place  On  Tele  .   Will  Need  Wei  For  Further  Evaluation  2  Moderate  Aortic  Stenosis  Echo  2016  Need  Fu  Evaluation  3  Sob  Secondary  To  Pneumonia  And    Decreased  Lung  Volume ( lobectemy)  Clinically  Not  In  chf  4  htn  5  Dm    Managed  By  primary  Recommendations:   Anticoagulation  In  Progress  Baseline  Echo,  Will  Need  WEI     Carlton Metzger MD, 11/10/2018 9:02 AM

## 2018-11-10 NOTE — ED PROVIDER NOTES
Denies sore throat or ear pain   Cardiovascular:  Denies chest pain, palpitations   + syncope  Respiratory:  Denies shortness of breath    GI:  Denies abdominal pain, nausea, vomiting, or diarrhea  :  Denies any urinary symptoms    Musculoskeletal:  Denies back pain,   Skin:  Denies rash  Neurologic:  Denies headache, focal weakness or sensory changes   Endocrine:  Denies polyuria or polydypsia   Lymphatic:  Denies swollen glands     All other review of systems are negative  See HPI and nursing notes for additional information     PAST MEDICAL AND SURGICAL HISTORY    Past Medical History:   Diagnosis Date    CAD (coronary artery disease)     Dr. Abiola Martínez of left lung ([facility] [address] 3/21/2016    Dr. Cole Mcghee Diabetes mellitus [facility]) Dx 1970's    type II    Heart murmur, systolic     [place] (hard of hearing)     Bilateral Ears    Hyperlipidemia     Hypertension     follow with Dr Gopal Carson Dx 12-31-15    Sees Dr. Cole Mcghee Lung mass     scheduled for lung bx 12/31/2015/ Dr. Chavez Villalpando Teeth missing     Upper And Lower    Thyroid disease     CT Guided Biopsy Left Thyroid Mass Done 3-4-16    Wears glasses      Past Surgical History:   Procedure Laterality Date    CARDIAC CATHETERIZATION      per old chart pt had cardiac cath and right femoral artery angiogram done 11/2008    DENTAL SURGERY      Teeth Extracted In Past    LUNG SURGERY Left 03/16/2016    Robotic assisted thorocotomy, DARLENE, node sampling and right supraclavicular lymph node biopsy    OTHER SURGICAL HISTORY  3-4-16    CT Guided Biopsy Left Thyroid Mass    OTHER SURGICAL HISTORY  12-31-15     CT Guided Lung Biopsy Done 12-31-15    TONSILLECTOMY  1960's       CURRENT MEDICATIONS    Current Outpatient Rx   Medication Sig Dispense Refill    hydrochlorothiazide (MICROZIDE) 12.5 MG capsule Take 12.5 mg by mouth daily      spironolactone (ALDACTONE) 25 MG tablet Take 25 mg by mouth daily      budesonide-formoterol (SYMBICORT) Nucleated RBC % 0.0 %    Total Nucleated RBC 0.0 K/CU MM    Total Immature Neutrophil 0.02 K/CU MM    Immature Neutrophil % 0.2 0 - 0.43 %   Comprehensive Metabolic Panel w/ Reflex to MG   Result Value Ref Range    Sodium 137 135 - 145 MMOL/L    Potassium 4.6 3.5 - 5.1 MMOL/L    Chloride 102 99 - 110 mMol/L    CO2 28 21 - 32 MMOL/L    BUN 22 6 - 23 MG/DL    CREATININE 1.8 (H) 0.9 - 1.3 MG/DL    Glucose 225 (H) 70 - 99 MG/DL    Calcium 9.0 8.3 - 10.6 MG/DL    Alb 3.3 (L) 3.4 - 5.0 GM/DL    Total Protein 7.1 6.4 - 8.2 GM/DL    Total Bilirubin 0.2 0.0 - 1.0 MG/DL    ALT 12 10 - 40 U/L    AST 14 (L) 15 - 37 IU/L    Alkaline Phosphatase 118 40 - 128 IU/L    GFR Non- 36 (L) >60 mL/min/1.73m2    GFR  44 (L) >60 mL/min/1.73m2    Anion Gap 7 4 - 16   Lactic Acid, Plasma   Result Value Ref Range    Lactate 0.8 0.4 - 2.0 mMOL/L   Protime/INR & PTT   Result Value Ref Range    Protime 12.3 9.12 - 12.5 SECONDS    INR 1.06 INDEX    aPTT 36.1 (H) 21.2 - 33.0 SECONDS   Troponin   Result Value Ref Range    Troponin T <0.010 <0.01 NG/ML   Brain Natriuretic Peptide   Result Value Ref Range    Pro-.0 (H) <300 PG/ML   CBC   Result Value Ref Range    WBC 8.4 4.0 - 10.5 K/CU MM    RBC 4.68 4.6 - 6.2 M/CU MM    Hemoglobin 13.8 13.5 - 18.0 GM/DL    Hematocrit 42.4 42 - 52 %    MCV 90.6 78 - 100 FL    MCH 29.5 27 - 31 PG    MCHC 32.5 32.0 - 36.0 %    RDW 14.8 11.7 - 14.9 %    Platelets 255 789 - 125 K/CU MM    MPV 10.3 7.5 - 11.1 FL   APTT   Result Value Ref Range    aPTT 81.9 (H) 21.2 - 33.0 SECONDS   EKG 12 Lead   Result Value Ref Range    Ventricular Rate 78 BPM    Atrial Rate 78 BPM    P-R Interval 166 ms    QRS Duration 136 ms    Q-T Interval 414 ms    QTc Calculation (Bazett) 471 ms    P Axis 90 degrees    R Axis 15 degrees    T Axis 107 degrees    Diagnosis       Normal sinus rhythm  Left bundle branch block  Abnormal ECG  No previous ECGs available             EKG    See attending note    ED

## 2018-11-10 NOTE — H&P
affect/speech. No agitation  Eyes - Eye lids intact. No scleral icterus  ENT - Lips wnl. External ear clear/dry/intact. No thyromegaly on inspection  Neuro - No gross peripheral or central neuro deficits on inspection  Heart - Sinus. RRR. S1 and S2 present. No added HS/murmurs appreciated. No elevated JVD appreciated  Lung - Adequate air entry b/l, Bilateral crackles/wheezes appreciated  GI - Soft. No guarding/rigidity. No hepatosplenomegaly/ascites. BS+  Skin - Intact. No rash/petechiae/ecchymosis. Warm extremities  MSK - Joints with normal ROM. No joint swellings    Lines/Drains/Airways/Wounds:  [unfilled]    LABS AND IMAGING   CBC  [unfilled]    Last 3 Hemoglobin  Lab Results   Component Value Date    HGB 13.7 11/09/2018    HGB 14.8 11/05/2018    HGB 14.4 04/06/2018     Last 3 WBC/ANC  Lab Results   Component Value Date    WBC 8.8 11/09/2018    WBC 4.3 11/05/2018    WBC 6.3 04/06/2018     No components found for: GRNLOCTYABS  Last 3 Platelets  No results found for: PLATELET  Chemistry  [unfilled]  [unfilled]  Lab Results   Component Value Date     09/01/2017     08/11/2016     06/15/2016     Coagulation Studies  Lab Results   Component Value Date    INR 1.06 11/09/2018     Liver Function Studies  Lab Results   Component Value Date    ALT 12 11/09/2018    AST 14 11/09/2018    ALKPHOS 118 11/09/2018       Recent Imaging        Relevant labs and imaging reviewed    ASSESSMENT AND PLAN     Atrial thrombosis  Continue IV heparin  2-D echo in the a.m.   Cardiology consult    Pneumonia  Continue vancomycin and Zosyn  Follow-up culture results    DM type II  Sliding scale insulin with Lantus  Fingersticks blood sugar monitoring  Diabetic diet    Hypertension  Increase amlodipine to 10 mg by mouth daily  Hold lisinopril and hydrochlorothiazide due to worsening renal function    Case d/w ED physician    Hospitalist, Internal Medicine  11/10/2018 at 1:10 AM

## 2018-11-11 LAB
ANION GAP SERPL CALCULATED.3IONS-SCNC: 7 MMOL/L (ref 4–16)
APTT: 35 SECONDS (ref 21.2–33)
APTT: 36.2 SECONDS (ref 21.2–33)
APTT: 36.8 SECONDS (ref 21.2–33)
APTT: 36.9 SECONDS (ref 21.2–33)
BASOPHILS ABSOLUTE: 0 K/CU MM
BASOPHILS RELATIVE PERCENT: 0.6 % (ref 0–1)
BUN BLDV-MCNC: 24 MG/DL (ref 6–23)
CALCIUM SERPL-MCNC: 8.9 MG/DL (ref 8.3–10.6)
CHLORIDE BLD-SCNC: 99 MMOL/L (ref 99–110)
CO2: 27 MMOL/L (ref 21–32)
CREAT SERPL-MCNC: 1.7 MG/DL (ref 0.9–1.3)
DIFFERENTIAL TYPE: ABNORMAL
EOSINOPHILS ABSOLUTE: 0.2 K/CU MM
EOSINOPHILS RELATIVE PERCENT: 3.2 % (ref 0–3)
GFR AFRICAN AMERICAN: 47 ML/MIN/1.73M2
GFR NON-AFRICAN AMERICAN: 39 ML/MIN/1.73M2
GLUCOSE BLD-MCNC: 251 MG/DL (ref 70–99)
HCT VFR BLD CALC: 43.7 % (ref 42–52)
HEMOGLOBIN: 13.7 GM/DL (ref 13.5–18)
IMMATURE NEUTROPHIL %: 0.4 % (ref 0–0.43)
LEGIONELLA URINARY AG: NEGATIVE
LYMPHOCYTES ABSOLUTE: 1.2 K/CU MM
LYMPHOCYTES RELATIVE PERCENT: 22.8 % (ref 24–44)
MCH RBC QN AUTO: 28.8 PG (ref 27–31)
MCHC RBC AUTO-ENTMCNC: 31.4 % (ref 32–36)
MCV RBC AUTO: 91.8 FL (ref 78–100)
MONOCYTES ABSOLUTE: 0.7 K/CU MM
MONOCYTES RELATIVE PERCENT: 13.4 % (ref 0–4)
NUCLEATED RBC %: 0 %
PDW BLD-RTO: 14.7 % (ref 11.7–14.9)
PLATELET # BLD: 254 K/CU MM (ref 140–440)
PMV BLD AUTO: 9.9 FL (ref 7.5–11.1)
POTASSIUM SERPL-SCNC: 4.6 MMOL/L (ref 3.5–5.1)
RBC # BLD: 4.76 M/CU MM (ref 4.6–6.2)
SEGMENTED NEUTROPHILS ABSOLUTE COUNT: 3.2 K/CU MM
SEGMENTED NEUTROPHILS RELATIVE PERCENT: 59.6 % (ref 36–66)
SODIUM BLD-SCNC: 133 MMOL/L (ref 135–145)
STREP PNEUMONIAE ANTIGEN: NORMAL
TOTAL IMMATURE NEUTOROPHIL: 0.02 K/CU MM
TOTAL NUCLEATED RBC: 0 K/CU MM
WBC # BLD: 5.3 K/CU MM (ref 4–10.5)

## 2018-11-11 PROCEDURE — 6360000002 HC RX W HCPCS: Performed by: INTERNAL MEDICINE

## 2018-11-11 PROCEDURE — 2580000003 HC RX 258: Performed by: HOSPITALIST

## 2018-11-11 PROCEDURE — 87449 NOS EACH ORGANISM AG IA: CPT

## 2018-11-11 PROCEDURE — 87077 CULTURE AEROBIC IDENTIFY: CPT

## 2018-11-11 PROCEDURE — 87070 CULTURE OTHR SPECIMN AEROBIC: CPT

## 2018-11-11 PROCEDURE — 1200000000 HC SEMI PRIVATE

## 2018-11-11 PROCEDURE — 85025 COMPLETE CBC W/AUTO DIFF WBC: CPT

## 2018-11-11 PROCEDURE — 99232 SBSQ HOSP IP/OBS MODERATE 35: CPT | Performed by: INTERNAL MEDICINE

## 2018-11-11 PROCEDURE — 87186 SC STD MICRODIL/AGAR DIL: CPT

## 2018-11-11 PROCEDURE — 87205 SMEAR GRAM STAIN: CPT

## 2018-11-11 PROCEDURE — 2580000003 HC RX 258: Performed by: INTERNAL MEDICINE

## 2018-11-11 PROCEDURE — 2500000003 HC RX 250 WO HCPCS: Performed by: HOSPITALIST

## 2018-11-11 PROCEDURE — 80048 BASIC METABOLIC PNL TOTAL CA: CPT

## 2018-11-11 PROCEDURE — 6370000000 HC RX 637 (ALT 250 FOR IP): Performed by: HOSPITALIST

## 2018-11-11 PROCEDURE — 94761 N-INVAS EAR/PLS OXIMETRY MLT: CPT

## 2018-11-11 PROCEDURE — 87899 AGENT NOS ASSAY W/OPTIC: CPT

## 2018-11-11 PROCEDURE — 94640 AIRWAY INHALATION TREATMENT: CPT

## 2018-11-11 PROCEDURE — 36415 COLL VENOUS BLD VENIPUNCTURE: CPT

## 2018-11-11 RX ADMIN — Medication 2 PUFF: at 07:47

## 2018-11-11 RX ADMIN — SODIUM CHLORIDE, PRESERVATIVE FREE 10 ML: 5 INJECTION INTRAVENOUS at 09:01

## 2018-11-11 RX ADMIN — HYDROCHLOROTHIAZIDE 12.5 MG: 12.5 TABLET ORAL at 09:00

## 2018-11-11 RX ADMIN — TAZOBACTAM SODIUM AND PIPERACILLIN SODIUM 3.38 G: 375; 3 INJECTION, SOLUTION INTRAVENOUS at 05:56

## 2018-11-11 RX ADMIN — SPIRONOLACTONE 25 MG: 25 TABLET ORAL at 09:00

## 2018-11-11 RX ADMIN — AMLODIPINE BESYLATE 5 MG: 5 TABLET ORAL at 09:00

## 2018-11-11 RX ADMIN — SIMVASTATIN 20 MG: 20 TABLET, FILM COATED ORAL at 21:00

## 2018-11-11 RX ADMIN — Medication 2 PUFF: at 22:51

## 2018-11-11 RX ADMIN — HEPARIN SODIUM 5000 UNITS: 5000 INJECTION INTRAVENOUS; SUBCUTANEOUS at 05:56

## 2018-11-11 RX ADMIN — SODIUM CHLORIDE, PRESERVATIVE FREE 10 ML: 5 INJECTION INTRAVENOUS at 21:01

## 2018-11-11 RX ADMIN — SODIUM CHLORIDE, PRESERVATIVE FREE 10 ML: 5 INJECTION INTRAVENOUS at 09:09

## 2018-11-11 RX ADMIN — TAZOBACTAM SODIUM AND PIPERACILLIN SODIUM 3.38 G: 375; 3 INJECTION, SOLUTION INTRAVENOUS at 13:34

## 2018-11-11 RX ADMIN — AZITHROMYCIN MONOHYDRATE 500 MG: 500 INJECTION, POWDER, LYOPHILIZED, FOR SOLUTION INTRAVENOUS at 09:51

## 2018-11-11 RX ADMIN — TAZOBACTAM SODIUM AND PIPERACILLIN SODIUM 3.38 G: 375; 3 INJECTION, SOLUTION INTRAVENOUS at 21:00

## 2018-11-11 RX ADMIN — HEPARIN SODIUM 5000 UNITS: 5000 INJECTION INTRAVENOUS; SUBCUTANEOUS at 13:34

## 2018-11-11 RX ADMIN — LISINOPRIL 2.5 MG: 2.5 TABLET ORAL at 09:00

## 2018-11-11 ASSESSMENT — PAIN SCALES - GENERAL
PAINLEVEL_OUTOF10: 0

## 2018-11-11 NOTE — PLAN OF CARE
Problem: Discharge Planning:  Goal: Discharged to appropriate level of care  Discharged to appropriate level of care   Outcome: Ongoing    Goal: Participates in care planning  Participates in care planning   Outcome: Ongoing      Problem: Airway Clearance - Ineffective:  Goal: Clear lung sounds  Clear lung sounds   Outcome: Ongoing    Goal: Ability to maintain a clear airway will improve  Ability to maintain a clear airway will improve   Outcome: Ongoing      Problem: Fluid Volume - Deficit:  Goal: Achieves intake and output within specified parameters  Achieves intake and output within specified parameters   Outcome: Ongoing      Problem: Falls - Risk of:  Goal: Will remain free from falls  Will remain free from falls    Outcome: Ongoing    Goal: Absence of physical injury  Absence of physical injury    Outcome: Ongoing

## 2018-11-11 NOTE — PROGRESS NOTES
Progress Note (Hospitalist, Internal Medicine)  IDENTIFYING INFORMATION   PATIENT:  Halle Ernst  MRN:  [id number]  ADMIT DATE: 11/9/2018  TIME OF EVALUATION: 11/11/2018 1:47 PM      HISTORY OF PRESENT ILLNESS   Patient is an 51-year-old male history of lung cancer status post left-sided lobectomy was sent for outpatient CT by his oncologist during when it was noted he had an atrial thrombus and multifocal pneumonia which he presents to the ER. He reports progressively worsening dyspnea and cough productive of blood stained phlegm. He had no chills or fever. He had no chest pain. For the atrial thrombus, he was started on anticoagulation with IV heparin. For his pneumonia, he was started on both spectrum antibiotics for healthcare associated pneumonia.       SUBJECTIVE     Continues to feel better. No needs today. Awaiting ALEX tomorrow    MEDICATIONS   Medications Prior to Admission  Prescriptions Prior to Admission: hydrochlorothiazide (MICROZIDE) 12.5 MG capsule, Take 12.5 mg by mouth daily  spironolactone (ALDACTONE) 25 MG tablet, Take 25 mg by mouth daily  budesonide-formoterol (SYMBICORT) 160-4.5 MCG/ACT AERO, Inhale 2 puffs into the lungs 2 times daily  albuterol sulfate HFA (PROAIR HFA) 108 (90 Base) MCG/ACT inhaler, Inhale 2 puffs into the lungs every 6 hours as needed for Wheezing  simvastatin (ZOCOR) 20 MG tablet, TAKE ONE TABLET BY MOUTH DAILY  ACCU-CHEK FRANKY PLUS strip, USE ONE STRIP TO TEST TWICE A DAY  quinapril (ACCUPRIL) 40 MG tablet, TAKE ONE TABLET BY MOUTH EVERY MORNING  amLODIPine (NORVASC) 5 MG tablet, TAKE ONE TABLET BY MOUTH EVERY MORNING  insulin lispro protamine & lispro (HUMALOG MIX 75/25) (75-25) 100 UNIT/ML SUSP injection vial, Use 22 units in the morning with breakfast and 18 units in the evening with dinner.     Current Medications  Current Facility-Administered Medications   Medication Dose Route Frequency Provider Last Rate Last Dose    azithromycin (ZITHROMAX) 500 mg in

## 2018-11-11 NOTE — PROGRESS NOTES
Pulmonary and Critical Care  Progress Note      VITALS:  /71   Pulse 64   Temp 97.5 °F (36.4 °C) (Oral)   Resp 16   Ht 5' 8\" (1.727 m)   Wt 137 lb (62.1 kg)   SpO2 96%   BMI 20.83 kg/m²     Subjective:   CHIEF COMPLAINT :SOB     HPI:                The patient is a 80 y.o. male with significant past medical history of COPD and lung s/p left lobectomy  presents with complaints of SOB. At this time he is being treated with the Abx and nebs. He is lying in the bed. He is not in acute resp distress. Objective:   PHYSICAL EXAM:    LUNGS:Occasional basal crackles  Abd- soft, BS+,NT  Ext - no pedal edema  CVS- s1s2, no murmurs      DATA:    CBC:  Recent Labs      11/09/18   2037  11/10/18   0428  11/11/18   0445   WBC  8.8  8.4  5.3   RBC  4.64  4.68  4.76   HGB  13.7  13.8  13.7   HCT  42.6  42.4  43.7   PLT  273  275  254   MCV  91.8  90.6  91.8   MCH  29.5  29.5  28.8   MCHC  32.2  32.5  31.4*   RDW  14.8  14.8  14.7   SEGSPCT  78.3*   --   59.6      BMP:  Recent Labs      11/09/18   2037  11/10/18   0428  11/11/18   0445   NA  137  137  133*   K  4.6  4.4  4.6   CL  102  101  99   CO2  28  27  27   BUN  22  21  24*   CREATININE  1.8*  1.6*  1.7*   CALCIUM  9.0  8.9  8.9   GLUCOSE  225*  177*  251*      ABG:  No results for input(s): PH, PO2ART, CLE5FRV, HCO3, BEART, O2SAT in the last 72 hours. BNP  No results found for: BNP   D-Dimer:  No results found for: DDIMER   1. Radiology: None      Assessment/Plan     Patient Active Problem List    Diagnosis Date Noted    Pneumonia 11/10/2018    Multifocal pneumonia     Essential hypertension 10/30/2018    Hypercholesterolemia 10/30/2018    Controlled type 2 diabetes mellitus with stage 3 chronic kidney disease, with long-term current use of insulin ([facility] [address] 10/30/2018    Pulmonary emphysema ([address] 10/30/2018    Nonrheumatic aortic valve stenosis 10/30/2018    Squamous cell carcinoma of left lung ([facility] 10/30/2018          1. C/w nesb  2. C/w abx  3.  F/u

## 2018-11-12 VITALS
TEMPERATURE: 97 F | OXYGEN SATURATION: 97 % | HEART RATE: 65 BPM | DIASTOLIC BLOOD PRESSURE: 64 MMHG | HEIGHT: 68 IN | SYSTOLIC BLOOD PRESSURE: 107 MMHG | RESPIRATION RATE: 20 BRPM | WEIGHT: 137 LBS | BODY MASS INDEX: 20.76 KG/M2

## 2018-11-12 LAB
ANION GAP SERPL CALCULATED.3IONS-SCNC: 9 MMOL/L (ref 4–16)
APTT: 33.7 SECONDS (ref 21.2–33)
APTT: 36.2 SECONDS (ref 21.2–33)
BASOPHILS ABSOLUTE: 0 K/CU MM
BASOPHILS RELATIVE PERCENT: 0.8 % (ref 0–1)
BUN BLDV-MCNC: 24 MG/DL (ref 6–23)
CALCIUM SERPL-MCNC: 8.8 MG/DL (ref 8.3–10.6)
CHLORIDE BLD-SCNC: 101 MMOL/L (ref 99–110)
CO2: 25 MMOL/L (ref 21–32)
CREAT SERPL-MCNC: 1.6 MG/DL (ref 0.9–1.3)
DIFFERENTIAL TYPE: ABNORMAL
EOSINOPHILS ABSOLUTE: 0.2 K/CU MM
EOSINOPHILS RELATIVE PERCENT: 3 % (ref 0–3)
GFR AFRICAN AMERICAN: 50 ML/MIN/1.73M2
GFR NON-AFRICAN AMERICAN: 41 ML/MIN/1.73M2
GLUCOSE BLD-MCNC: 221 MG/DL (ref 70–99)
GLUCOSE BLD-MCNC: 301 MG/DL (ref 70–99)
HCT VFR BLD CALC: 43.7 % (ref 42–52)
HEMOGLOBIN: 13.9 GM/DL (ref 13.5–18)
IMMATURE NEUTROPHIL %: 0.2 % (ref 0–0.43)
LV EF: 53 %
LVEF MODALITY: NORMAL
LYMPHOCYTES ABSOLUTE: 1.1 K/CU MM
LYMPHOCYTES RELATIVE PERCENT: 20.1 % (ref 24–44)
MCH RBC QN AUTO: 29 PG (ref 27–31)
MCHC RBC AUTO-ENTMCNC: 31.8 % (ref 32–36)
MCV RBC AUTO: 91 FL (ref 78–100)
MONOCYTES ABSOLUTE: 0.6 K/CU MM
MONOCYTES RELATIVE PERCENT: 11.8 % (ref 0–4)
NUCLEATED RBC %: 0 %
PDW BLD-RTO: 14.6 % (ref 11.7–14.9)
PLATELET # BLD: 240 K/CU MM (ref 140–440)
PMV BLD AUTO: 9.8 FL (ref 7.5–11.1)
POTASSIUM SERPL-SCNC: 4.7 MMOL/L (ref 3.5–5.1)
RBC # BLD: 4.8 M/CU MM (ref 4.6–6.2)
SEGMENTED NEUTROPHILS ABSOLUTE COUNT: 3.4 K/CU MM
SEGMENTED NEUTROPHILS RELATIVE PERCENT: 64.1 % (ref 36–66)
SODIUM BLD-SCNC: 135 MMOL/L (ref 135–145)
TOTAL IMMATURE NEUTOROPHIL: 0.01 K/CU MM
TOTAL NUCLEATED RBC: 0 K/CU MM
WBC # BLD: 5.3 K/CU MM (ref 4–10.5)

## 2018-11-12 PROCEDURE — 7100000001 HC PACU RECOVERY - ADDTL 15 MIN

## 2018-11-12 PROCEDURE — 82962 GLUCOSE BLOOD TEST: CPT

## 2018-11-12 PROCEDURE — 2500000003 HC RX 250 WO HCPCS: Performed by: HOSPITALIST

## 2018-11-12 PROCEDURE — 7100000000 HC PACU RECOVERY - FIRST 15 MIN

## 2018-11-12 PROCEDURE — 6370000000 HC RX 637 (ALT 250 FOR IP): Performed by: INTERNAL MEDICINE

## 2018-11-12 PROCEDURE — 94640 AIRWAY INHALATION TREATMENT: CPT

## 2018-11-12 PROCEDURE — 94761 N-INVAS EAR/PLS OXIMETRY MLT: CPT

## 2018-11-12 PROCEDURE — 36415 COLL VENOUS BLD VENIPUNCTURE: CPT

## 2018-11-12 PROCEDURE — 85730 THROMBOPLASTIN TIME PARTIAL: CPT

## 2018-11-12 PROCEDURE — B24BZZ4 ULTRASONOGRAPHY OF HEART WITH AORTA, TRANSESOPHAGEAL: ICD-10-PCS | Performed by: INTERNAL MEDICINE

## 2018-11-12 PROCEDURE — 93312 ECHO TRANSESOPHAGEAL: CPT

## 2018-11-12 PROCEDURE — 2580000003 HC RX 258: Performed by: HOSPITALIST

## 2018-11-12 PROCEDURE — 85025 COMPLETE CBC W/AUTO DIFF WBC: CPT

## 2018-11-12 PROCEDURE — 80048 BASIC METABOLIC PNL TOTAL CA: CPT

## 2018-11-12 RX ORDER — DEXTROSE MONOHYDRATE 50 MG/ML
100 INJECTION, SOLUTION INTRAVENOUS PRN
Status: DISCONTINUED | OUTPATIENT
Start: 2018-11-12 | End: 2018-11-12 | Stop reason: HOSPADM

## 2018-11-12 RX ORDER — DEXTROSE MONOHYDRATE 25 G/50ML
12.5 INJECTION, SOLUTION INTRAVENOUS PRN
Status: DISCONTINUED | OUTPATIENT
Start: 2018-11-12 | End: 2018-11-12 | Stop reason: HOSPADM

## 2018-11-12 RX ORDER — NICOTINE POLACRILEX 4 MG
15 LOZENGE BUCCAL PRN
Status: DISCONTINUED | OUTPATIENT
Start: 2018-11-12 | End: 2018-11-12 | Stop reason: HOSPADM

## 2018-11-12 RX ORDER — LEVOFLOXACIN 500 MG/1
500 TABLET, FILM COATED ORAL DAILY
Qty: 7 TABLET | Refills: 0 | Status: SHIPPED | OUTPATIENT
Start: 2018-11-12 | End: 2018-11-19

## 2018-11-12 RX ADMIN — Medication 2 PUFF: at 08:10

## 2018-11-12 RX ADMIN — SODIUM CHLORIDE, PRESERVATIVE FREE 10 ML: 5 INJECTION INTRAVENOUS at 08:05

## 2018-11-12 RX ADMIN — TAZOBACTAM SODIUM AND PIPERACILLIN SODIUM 3.38 G: 375; 3 INJECTION, SOLUTION INTRAVENOUS at 12:50

## 2018-11-12 RX ADMIN — TAZOBACTAM SODIUM AND PIPERACILLIN SODIUM 3.38 G: 375; 3 INJECTION, SOLUTION INTRAVENOUS at 05:26

## 2018-11-12 RX ADMIN — INSULIN LISPRO 4 UNITS: 100 INJECTION, SOLUTION INTRAVENOUS; SUBCUTANEOUS at 14:52

## 2018-11-12 ASSESSMENT — PAIN SCALES - GENERAL: PAINLEVEL_OUTOF10: 0

## 2018-11-12 NOTE — CARE COORDINATION
CM into see pt to initiate a safe discharge plan. Cm into see, introduced self and explained role of CM. Pt is alone in room. Pt is kind, alert and oriented. Pt lives alone in elderly apartments. Pt is well supported by his dtr. Pt uses no DME. Pt is able to drive, has a truck. Pt has a new PCP across from his apartments but is unable to remember name. Pt provided information for PCP and health resource center. Pt has insurance and prescriptions are covered. Pt shared that he walks to the grocery. Pt meals prepared for him by a friend. Pt plans to return home at time of discharge. pt denies any needs and his dtr will provide transportation. CM updated white board and encouraged to call for any needs or concern. CM is available if any needs arise.    [address]
Navigator or Home Health Nurse if have signs and symptoms:  Shortness of breath,thicker/colored phlegm,  using rescue inhalers or nebulizer more than normal,  fatigued, unable to do usual activities,   fever,chills & sweating,   sleeping poorly,  dry cough , headache       Symptoms may indicate you may need to adjust your medications  Limit your activities  Make sure you are taking medications correctly  Force fluids  Take medications for your fever      Call Physician immediately or 911 if have symptoms:  Unrelieved shortness of breath  Shortness of breath at rest  Fever over 101  Confusion  Sharp/stabbing pain in chest worse with deep breaths and cough  Severe to increased coughing  Unusual fatigue  Unable to stay awake  Bluish color to lips or fingernails    Verified if patient had received flu or Pneumonia vaccines previously. Teachback received from patient/family. CN contact information given.      PNEUMONIA CHECKLIST    Was the PNA Order set used      No  Pneumonia Stoplight Education    Yes  Antibiotic given within 24 hours    Yes 11/9 2045  Blood cultures drawn before antibiotic given   Yes 11/9 2006  O2 Saturation on admission     97% RA      Consults:             Smoking Cessation      NA  Pulmonary       Yes  Med Assist Consult      No  Home Health Care Consult     No  Palliative Care Consult     No  Respiratory Consult      Yes  (including bedside instructions on nebulizers, inhalers, and assessment of oxygen and equipment needs at home)      Discharge:             Pneumococcal Vaccine given before discharge  Yes  Flu Vaccine given before discharge    Yes  Inhalers       No  Spacer        NA  Steroids       No  Follow-up appointment scheduled within 5-7 days  Yes  Cardio/Pulmonary Wellness program consult  NA  Home Oxygen       No  Neblizer, bipap, cpap at home    No  Home air quality assessment [facility]

## 2018-11-12 NOTE — PROGRESS NOTES
Stopped at 11/12/18 0807    heparin (porcine) injection 5,000 Units  5,000 Units Subcutaneous 3 times per day Mari Velez MD   Stopped at 11/11/18 2103           Labs:  CBC with Differential:    Lab Results   Component Value Date    WBC 5.3 11/12/2018    RBC 4.80 11/12/2018    HGB 13.9 11/12/2018    HCT 43.7 11/12/2018     11/12/2018    MCV 91.0 11/12/2018    MCH 29.0 11/12/2018    MCHC 31.8 11/12/2018    RDW 14.6 11/12/2018    SEGSPCT 64.1 11/12/2018    LYMPHOPCT 20.1 11/12/2018    MONOPCT 11.8 11/12/2018    EOSPCT 3 01/21/2016    BASOPCT 0.8 11/12/2018    MONOSABS 0.6 11/12/2018    LYMPHSABS 1.1 11/12/2018    EOSABS 0.2 11/12/2018    BASOSABS 0.0 11/12/2018    DIFFTYPE AUTOMATED DIFFERENTIAL 11/12/2018     CMP:    Lab Results   Component Value Date     11/12/2018    K 4.7 11/12/2018     11/12/2018    CO2 25 11/12/2018    BUN 24 11/12/2018    CREATININE 1.6 11/12/2018    GFRAA 50 11/12/2018    AGRATIO 1.1 11/05/2018    LABGLOM 41 11/12/2018    GLUCOSE 301 11/12/2018    PROT 7.1 11/09/2018    LABALBU 3.3 11/09/2018    CALCIUM 8.8 11/12/2018    BILITOT 0.2 11/09/2018    ALKPHOS 118 11/09/2018    AST 14 11/09/2018    ALT 12 11/09/2018     Hepatic Function Panel:    Lab Results   Component Value Date    ALKPHOS 118 11/09/2018    ALT 12 11/09/2018    AST 14 11/09/2018    PROT 7.1 11/09/2018    BILITOT 0.2 11/09/2018    LABALBU 3.3 11/09/2018     Magnesium:    Lab Results   Component Value Date    MG 1.9 06/15/2016     PT/INR:    Lab Results   Component Value Date    PROTIME 12.3 11/09/2018    INR 1.06 11/09/2018     Last 3 Troponin:  No results found for: TROPONINI  U/A:    Lab Results   Component Value Date    COLORU YELLOW 03/08/2016    WBCUA 2 03/08/2016    RBCUA 1 03/08/2016    MUCUS RARE 03/08/2016    BACTERIA RARE 03/08/2016    CLARITYU CLEAR 03/08/2016    SPECGRAV 1.015 03/08/2016    LEUKOCYTESUR SMALL 03/08/2016    UROBILINOGEN 0.2 03/08/2016    BILIRUBINUR NEGATIVE 03/08/2016

## 2018-11-12 NOTE — DISCHARGE INSTR - OTHER ORDERS
Follow-up With  Details  Why  Contact Info   Yahir Woods PA-C  Go on 11/14/2018  follow up with PCP for post hospitalization care go @ 08:30 AM  [address] [facility] [address]. Vesta Blevins MD  Go on 12/3/2018  follow up @ 12:15 PM  [address]  [phone]   La Nena Rubin MD  Go on 11/19/2018  to resume follow up surveillance of lung cancer go @ 09:00 AM  [address] [facility]  [phone]

## 2018-11-12 NOTE — DISCHARGE SUMMARY
Guido Labrum 1935 [id number]  PCP:  Suyapa Davey PA-C    Admit date: 11/9/2018  Admitting Physician: Gayle Rhodes MD    Discharge date: 11/12/2018 Discharge Physician: Remy De La Cruz MD      Reason for admission:   Chief Complaint   Patient presents with    Other     had out pt CT of his chest today and was sent in for a new blood clot      Present on Admission:   Pneumonia   Multifocal pneumonia       Discharge Diagnoses & Hospital Course[de-identified]       Question atrial thrombus  Suspected on CT chest  - card assisting  - TTE no significant findings  - completed ALEX today w/o overt findings. The initial CT scan read was an over-call likely caused by artifact as likely reason for initial suspicion for blood clot    Bronchiolitis, multi-focal PNA - likely both gram negative and positive  - on admission started on IV vanc, zosyn as empiric therapy  - add azithromycin  - d/c vanco since no evidence of HCAP  - send serologies negative  - home to complete course of PO levaquin  - known to Dr Jose Hogan - f/u outpatient     CKD  CKD stage 3 estimated     DMII  - resume home insulin     HTN  - restarted home med       Exam:   Wt Readings from Last 3 Encounters:   11/10/18 137 lb (62.1 kg)   10/30/18 147 lb (66.7 kg)   06/14/16 138 lb (62.6 kg)       Blood pressure 100/64, pulse 70, temperature 98.1 °F (36.7 °C), temperature source Oral, resp. rate 25, height 5' 8\" (1.727 m), weight 137 lb (62.1 kg), SpO2 100 %. General - AAO x 3  Psych - Appropriate affect/speech. No agitation  Eyes - Eye lids intact. No scleral icterus  Heart - Sinus. RRR. S1 and S2 present. No added HS/murmurs appreciated. No elevated JVD appreciated. Lung - Adequate air entry b/l, No overt crackles/wheezes appreciated  GI - Soft, non-tender. No guarding/rigidity. No hepatosplenomegaly/ascities.  BS+   - No CVA/suprapubic tenderness or palpable bladder distension    Significant Diagnostic Studies:   CBC:   Recent Labs      11/10/18   0428

## 2018-11-13 LAB
EKG ATRIAL RATE: 78 BPM
EKG DIAGNOSIS: NORMAL
EKG P AXIS: 90 DEGREES
EKG P-R INTERVAL: 166 MS
EKG Q-T INTERVAL: 414 MS
EKG QRS DURATION: 136 MS
EKG QTC CALCULATION (BAZETT): 471 MS
EKG R AXIS: 15 DEGREES
EKG T AXIS: 107 DEGREES
EKG VENTRICULAR RATE: 78 BPM

## 2018-11-14 LAB
CULTURE: NORMAL
CULTURE: NORMAL
Lab: NORMAL
Lab: NORMAL
REPORT STATUS: NORMAL
REPORT STATUS: NORMAL
SPECIMEN: NORMAL
SPECIMEN: NORMAL

## 2018-11-16 LAB
CULTURE: NORMAL
GRAM SMEAR: NORMAL
Lab: NORMAL
ORGANISM: NORMAL
ORGANISM: NORMAL
REPORT STATUS: NORMAL
SPECIMEN: NORMAL

## 2018-12-24 DIAGNOSIS — K21.9 GASTROESOPHAGEAL REFLUX DISEASE WITHOUT ESOPHAGITIS: ICD-10-CM

## 2018-12-24 DIAGNOSIS — E78.2 MIXED HYPERLIPIDEMIA: ICD-10-CM

## 2018-12-24 DIAGNOSIS — I10 ESSENTIAL HYPERTENSION: ICD-10-CM

## 2018-12-26 RX ORDER — HYDROCHLOROTHIAZIDE 12.5 MG/1
12.5 CAPSULE, GELATIN COATED ORAL DAILY
Qty: 30 CAPSULE | Refills: 5 | Status: SHIPPED | OUTPATIENT
Start: 2018-12-26 | End: 2019-04-18 | Stop reason: SDUPTHER

## 2018-12-26 RX ORDER — QUINAPRIL 40 MG/1
TABLET ORAL
Qty: 30 TABLET | Refills: 5 | Status: SHIPPED | OUTPATIENT
Start: 2018-12-26 | End: 2019-04-18 | Stop reason: SDUPTHER

## 2018-12-26 RX ORDER — AMLODIPINE BESYLATE 5 MG/1
TABLET ORAL
Qty: 30 TABLET | Refills: 5 | Status: SHIPPED | OUTPATIENT
Start: 2018-12-26 | End: 2019-04-18 | Stop reason: SDUPTHER

## 2018-12-26 RX ORDER — SPIRONOLACTONE 25 MG/1
25 TABLET ORAL DAILY
Qty: 30 TABLET | Refills: 5 | Status: SHIPPED | OUTPATIENT
Start: 2018-12-26 | End: 2019-04-18 | Stop reason: SDUPTHER

## 2018-12-26 RX ORDER — OMEPRAZOLE 20 MG/1
20 CAPSULE, DELAYED RELEASE ORAL DAILY
Qty: 30 CAPSULE | Refills: 5 | Status: SHIPPED | OUTPATIENT
Start: 2018-12-26 | End: 2019-04-18 | Stop reason: SDUPTHER

## 2018-12-26 RX ORDER — SIMVASTATIN 20 MG
TABLET ORAL
Qty: 30 TABLET | Refills: 5 | Status: SHIPPED | OUTPATIENT
Start: 2018-12-26 | End: 2019-04-18 | Stop reason: SDUPTHER

## 2019-02-11 DIAGNOSIS — Z79.4 TYPE 2 DIABETES MELLITUS WITHOUT COMPLICATION, WITH LONG-TERM CURRENT USE OF INSULIN (HCC): ICD-10-CM

## 2019-02-11 DIAGNOSIS — E11.9 TYPE 2 DIABETES MELLITUS WITHOUT COMPLICATION, WITH LONG-TERM CURRENT USE OF INSULIN (HCC): ICD-10-CM

## 2019-02-11 DIAGNOSIS — K21.9 GASTROESOPHAGEAL REFLUX DISEASE WITHOUT ESOPHAGITIS: ICD-10-CM

## 2019-02-11 DIAGNOSIS — J43.9 PULMONARY EMPHYSEMA, UNSPECIFIED EMPHYSEMA TYPE (HCC): ICD-10-CM

## 2019-02-11 DIAGNOSIS — I10 ESSENTIAL HYPERTENSION: ICD-10-CM

## 2019-02-11 DIAGNOSIS — E78.2 MIXED HYPERLIPIDEMIA: ICD-10-CM

## 2019-02-11 RX ORDER — AMLODIPINE BESYLATE 5 MG/1
TABLET ORAL
Qty: 30 TABLET | Refills: 5 | OUTPATIENT
Start: 2019-02-11

## 2019-02-11 RX ORDER — OMEPRAZOLE 20 MG/1
20 CAPSULE, DELAYED RELEASE ORAL DAILY
Qty: 30 CAPSULE | Refills: 5 | OUTPATIENT
Start: 2019-02-11

## 2019-02-11 RX ORDER — QUINAPRIL 40 MG/1
TABLET ORAL
Qty: 30 TABLET | Refills: 5 | OUTPATIENT
Start: 2019-02-11

## 2019-02-11 RX ORDER — BUDESONIDE AND FORMOTEROL FUMARATE DIHYDRATE 160; 4.5 UG/1; UG/1
2 AEROSOL RESPIRATORY (INHALATION) 2 TIMES DAILY
Qty: 1 INHALER | Refills: 3 | OUTPATIENT
Start: 2019-02-11

## 2019-02-11 RX ORDER — SPIRONOLACTONE 25 MG/1
25 TABLET ORAL DAILY
Qty: 30 TABLET | Refills: 5 | OUTPATIENT
Start: 2019-02-11

## 2019-02-11 RX ORDER — SIMVASTATIN 20 MG
TABLET ORAL
Qty: 30 TABLET | Refills: 5 | OUTPATIENT
Start: 2019-02-11

## 2019-02-11 RX ORDER — HYDROCHLOROTHIAZIDE 12.5 MG/1
12.5 CAPSULE, GELATIN COATED ORAL DAILY
Qty: 30 CAPSULE | Refills: 5 | OUTPATIENT
Start: 2019-02-11

## 2019-02-11 RX ORDER — ALBUTEROL SULFATE 90 UG/1
2 AEROSOL, METERED RESPIRATORY (INHALATION) EVERY 6 HOURS PRN
Qty: 1 INHALER | Refills: 3 | OUTPATIENT
Start: 2019-02-11

## 2019-02-13 ENCOUNTER — TELEPHONE (OUTPATIENT)
Dept: INTERNAL MEDICINE CLINIC | Age: 84
End: 2019-02-13

## 2019-04-18 ENCOUNTER — OFFICE VISIT (OUTPATIENT)
Dept: INTERNAL MEDICINE CLINIC | Age: 84
End: 2019-04-18
Payer: MEDICARE

## 2019-04-18 VITALS
OXYGEN SATURATION: 94 % | SYSTOLIC BLOOD PRESSURE: 138 MMHG | BODY MASS INDEX: 21.41 KG/M2 | WEIGHT: 140.8 LBS | RESPIRATION RATE: 18 BRPM | DIASTOLIC BLOOD PRESSURE: 68 MMHG | HEART RATE: 99 BPM

## 2019-04-18 DIAGNOSIS — K21.9 GASTROESOPHAGEAL REFLUX DISEASE WITHOUT ESOPHAGITIS: ICD-10-CM

## 2019-04-18 DIAGNOSIS — I10 ESSENTIAL HYPERTENSION: ICD-10-CM

## 2019-04-18 DIAGNOSIS — Z79.4 CONTROLLED TYPE 2 DIABETES MELLITUS WITH STAGE 3 CHRONIC KIDNEY DISEASE, WITH LONG-TERM CURRENT USE OF INSULIN (HCC): ICD-10-CM

## 2019-04-18 DIAGNOSIS — E78.2 MIXED HYPERLIPIDEMIA: ICD-10-CM

## 2019-04-18 DIAGNOSIS — N18.30 CONTROLLED TYPE 2 DIABETES MELLITUS WITH STAGE 3 CHRONIC KIDNEY DISEASE, WITH LONG-TERM CURRENT USE OF INSULIN (HCC): ICD-10-CM

## 2019-04-18 DIAGNOSIS — J43.9 PULMONARY EMPHYSEMA, UNSPECIFIED EMPHYSEMA TYPE (HCC): ICD-10-CM

## 2019-04-18 DIAGNOSIS — E11.22 CONTROLLED TYPE 2 DIABETES MELLITUS WITH STAGE 3 CHRONIC KIDNEY DISEASE, WITH LONG-TERM CURRENT USE OF INSULIN (HCC): ICD-10-CM

## 2019-04-18 DIAGNOSIS — R04.2 HEMOPTYSIS: Primary | ICD-10-CM

## 2019-04-18 PROCEDURE — 99214 OFFICE O/P EST MOD 30 MIN: CPT | Performed by: INTERNAL MEDICINE

## 2019-04-18 RX ORDER — ALBUTEROL SULFATE 90 UG/1
2 AEROSOL, METERED RESPIRATORY (INHALATION) EVERY 6 HOURS PRN
Qty: 1 INHALER | Refills: 3 | Status: SHIPPED | OUTPATIENT
Start: 2019-04-18 | End: 2019-08-12 | Stop reason: SDUPTHER

## 2019-04-18 RX ORDER — QUINAPRIL 40 MG/1
TABLET ORAL
Qty: 30 TABLET | Refills: 5 | Status: SHIPPED | OUTPATIENT
Start: 2019-04-18 | End: 2019-05-31 | Stop reason: SDUPTHER

## 2019-04-18 RX ORDER — BUDESONIDE AND FORMOTEROL FUMARATE DIHYDRATE 160; 4.5 UG/1; UG/1
2 AEROSOL RESPIRATORY (INHALATION) 2 TIMES DAILY
Qty: 1 INHALER | Refills: 3 | Status: SHIPPED | OUTPATIENT
Start: 2019-04-18 | End: 2019-08-12 | Stop reason: SDUPTHER

## 2019-04-18 RX ORDER — SPIRONOLACTONE 25 MG/1
25 TABLET ORAL DAILY
Qty: 30 TABLET | Refills: 5 | Status: SHIPPED | OUTPATIENT
Start: 2019-04-18 | End: 2019-08-12 | Stop reason: SDUPTHER

## 2019-04-18 RX ORDER — SIMVASTATIN 20 MG
TABLET ORAL
Qty: 30 TABLET | Refills: 5 | Status: SHIPPED | OUTPATIENT
Start: 2019-04-18 | End: 2019-04-25

## 2019-04-18 RX ORDER — OMEPRAZOLE 20 MG/1
20 CAPSULE, DELAYED RELEASE ORAL DAILY
Qty: 30 CAPSULE | Refills: 5 | Status: SHIPPED | OUTPATIENT
Start: 2019-04-18 | End: 2019-08-12 | Stop reason: SDUPTHER

## 2019-04-18 RX ORDER — AMLODIPINE BESYLATE 5 MG/1
TABLET ORAL
Qty: 30 TABLET | Refills: 5 | Status: SHIPPED | OUTPATIENT
Start: 2019-04-18 | End: 2019-08-12 | Stop reason: SDUPTHER

## 2019-04-18 RX ORDER — HYDROCHLOROTHIAZIDE 12.5 MG/1
12.5 CAPSULE, GELATIN COATED ORAL DAILY
Qty: 30 CAPSULE | Refills: 5 | Status: SHIPPED | OUTPATIENT
Start: 2019-04-18 | End: 2019-08-12 | Stop reason: SDUPTHER

## 2019-04-18 ASSESSMENT — PATIENT HEALTH QUESTIONNAIRE - PHQ9
2. FEELING DOWN, DEPRESSED OR HOPELESS: 0
SUM OF ALL RESPONSES TO PHQ9 QUESTIONS 1 & 2: 0
SUM OF ALL RESPONSES TO PHQ QUESTIONS 1-9: 0
SUM OF ALL RESPONSES TO PHQ QUESTIONS 1-9: 0
1. LITTLE INTEREST OR PLEASURE IN DOING THINGS: 0

## 2019-04-23 DIAGNOSIS — Z79.4 CONTROLLED TYPE 2 DIABETES MELLITUS WITH STAGE 3 CHRONIC KIDNEY DISEASE, WITH LONG-TERM CURRENT USE OF INSULIN (HCC): ICD-10-CM

## 2019-04-23 DIAGNOSIS — E78.2 MIXED HYPERLIPIDEMIA: ICD-10-CM

## 2019-04-23 DIAGNOSIS — I10 ESSENTIAL HYPERTENSION: ICD-10-CM

## 2019-04-23 DIAGNOSIS — N18.30 CONTROLLED TYPE 2 DIABETES MELLITUS WITH STAGE 3 CHRONIC KIDNEY DISEASE, WITH LONG-TERM CURRENT USE OF INSULIN (HCC): ICD-10-CM

## 2019-04-23 DIAGNOSIS — E11.22 CONTROLLED TYPE 2 DIABETES MELLITUS WITH STAGE 3 CHRONIC KIDNEY DISEASE, WITH LONG-TERM CURRENT USE OF INSULIN (HCC): ICD-10-CM

## 2019-04-23 DIAGNOSIS — R04.2 HEMOPTYSIS: ICD-10-CM

## 2019-04-23 LAB
A/G RATIO: 1 (ref 1.1–2.2)
ALBUMIN SERPL-MCNC: 3.8 G/DL (ref 3.4–5)
ALP BLD-CCNC: 124 U/L (ref 40–129)
ALT SERPL-CCNC: 13 U/L (ref 10–40)
ANION GAP SERPL CALCULATED.3IONS-SCNC: 11 MMOL/L (ref 3–16)
AST SERPL-CCNC: 12 U/L (ref 15–37)
BASOPHILS ABSOLUTE: 0 K/UL (ref 0–0.2)
BASOPHILS RELATIVE PERCENT: 0.8 %
BILIRUB SERPL-MCNC: 0.3 MG/DL (ref 0–1)
BUN BLDV-MCNC: 25 MG/DL (ref 7–20)
CALCIUM SERPL-MCNC: 9.7 MG/DL (ref 8.3–10.6)
CHLORIDE BLD-SCNC: 102 MMOL/L (ref 99–110)
CHOLESTEROL, TOTAL: 134 MG/DL (ref 0–199)
CO2: 28 MMOL/L (ref 21–32)
CREAT SERPL-MCNC: 1.9 MG/DL (ref 0.8–1.3)
D DIMER: 629 NG/ML DDU (ref 0–229)
EOSINOPHILS ABSOLUTE: 0.1 K/UL (ref 0–0.6)
EOSINOPHILS RELATIVE PERCENT: 2.4 %
GFR AFRICAN AMERICAN: 41
GFR NON-AFRICAN AMERICAN: 34
GLOBULIN: 3.7 G/DL
GLUCOSE BLD-MCNC: 167 MG/DL (ref 70–99)
HCT VFR BLD CALC: 47.5 % (ref 40.5–52.5)
HDLC SERPL-MCNC: 60 MG/DL (ref 40–60)
HEMOGLOBIN: 15.4 G/DL (ref 13.5–17.5)
LDL CHOLESTEROL CALCULATED: 57 MG/DL
LYMPHOCYTES ABSOLUTE: 1 K/UL (ref 1–5.1)
LYMPHOCYTES RELATIVE PERCENT: 18.6 %
MCH RBC QN AUTO: 29.3 PG (ref 26–34)
MCHC RBC AUTO-ENTMCNC: 32.4 G/DL (ref 31–36)
MCV RBC AUTO: 90.4 FL (ref 80–100)
MONOCYTES ABSOLUTE: 0.5 K/UL (ref 0–1.3)
MONOCYTES RELATIVE PERCENT: 10.2 %
NEUTROPHILS ABSOLUTE: 3.5 K/UL (ref 1.7–7.7)
NEUTROPHILS RELATIVE PERCENT: 68 %
PDW BLD-RTO: 15.4 % (ref 12.4–15.4)
PLATELET # BLD: 300 K/UL (ref 135–450)
PMV BLD AUTO: 8.8 FL (ref 5–10.5)
POTASSIUM SERPL-SCNC: 4.7 MMOL/L (ref 3.5–5.1)
RBC # BLD: 5.25 M/UL (ref 4.2–5.9)
SODIUM BLD-SCNC: 141 MMOL/L (ref 136–145)
TOTAL PROTEIN: 7.5 G/DL (ref 6.4–8.2)
TRIGL SERPL-MCNC: 87 MG/DL (ref 0–150)
VLDLC SERPL CALC-MCNC: 17 MG/DL
WBC # BLD: 5.1 K/UL (ref 4–11)

## 2019-04-24 DIAGNOSIS — R79.89 ELEVATED D-DIMER: Primary | ICD-10-CM

## 2019-04-24 LAB
ESTIMATED AVERAGE GLUCOSE: 188.6 MG/DL
HBA1C MFR BLD: 8.2 %

## 2019-04-25 ENCOUNTER — HOSPITAL ENCOUNTER (OUTPATIENT)
Dept: GENERAL RADIOLOGY | Age: 84
Discharge: HOME | End: 2019-04-25
Payer: MEDICARE

## 2019-04-25 ENCOUNTER — HOSPITAL ENCOUNTER (OUTPATIENT)
Dept: NUCLEAR MEDICINE | Age: 84
Discharge: HOME | End: 2019-04-25
Payer: MEDICARE

## 2019-04-25 ENCOUNTER — OFFICE VISIT (OUTPATIENT)
Dept: INTERNAL MEDICINE CLINIC | Age: 84
End: 2019-04-25
Payer: MEDICARE

## 2019-04-25 ENCOUNTER — HOSPITAL ENCOUNTER (OUTPATIENT)
Age: 84
Discharge: HOME | End: 2019-04-25
Payer: MEDICARE

## 2019-04-25 VITALS
HEART RATE: 69 BPM | DIASTOLIC BLOOD PRESSURE: 74 MMHG | OXYGEN SATURATION: 95 % | RESPIRATION RATE: 18 BRPM | SYSTOLIC BLOOD PRESSURE: 128 MMHG

## 2019-04-25 DIAGNOSIS — R79.89 ELEVATED D-DIMER: ICD-10-CM

## 2019-04-25 DIAGNOSIS — R04.2 HEMOPTYSIS: Primary | ICD-10-CM

## 2019-04-25 PROCEDURE — 3430000000 HC RX DIAGNOSTIC RADIOPHARMACEUTICAL: Performed by: INTERNAL MEDICINE

## 2019-04-25 PROCEDURE — A9540 TC99M MAA: HCPCS | Performed by: INTERNAL MEDICINE

## 2019-04-25 PROCEDURE — 71046 X-RAY EXAM CHEST 2 VIEWS: CPT

## 2019-04-25 PROCEDURE — A9539 TC99M PENTETATE: HCPCS | Performed by: INTERNAL MEDICINE

## 2019-04-25 PROCEDURE — 78582 LUNG VENTILAT&PERFUS IMAGING: CPT

## 2019-04-25 PROCEDURE — 99213 OFFICE O/P EST LOW 20 MIN: CPT | Performed by: INTERNAL MEDICINE

## 2019-04-25 RX ORDER — KIT FOR THE PREPARATION OF TECHNETIUM TC 99M PENTETATE 20 MG/1
3 INJECTION, POWDER, LYOPHILIZED, FOR SOLUTION INTRAVENOUS; RESPIRATORY (INHALATION)
Status: COMPLETED | OUTPATIENT
Start: 2019-04-25 | End: 2019-04-25

## 2019-04-25 RX ADMIN — Medication 5 MILLICURIE: at 09:50

## 2019-04-25 RX ADMIN — KIT FOR THE PREPARATION OF TECHNETIUM TC 99M PENTETATE 3 MILLICURIE: 20 INJECTION, POWDER, LYOPHILIZED, FOR SOLUTION INTRAVENOUS; RESPIRATORY (INHALATION) at 09:35

## 2019-04-26 DIAGNOSIS — E11.9 TYPE 2 DIABETES MELLITUS WITHOUT COMPLICATION, WITH LONG-TERM CURRENT USE OF INSULIN (HCC): ICD-10-CM

## 2019-04-26 DIAGNOSIS — Z79.4 TYPE 2 DIABETES MELLITUS WITHOUT COMPLICATION, WITH LONG-TERM CURRENT USE OF INSULIN (HCC): ICD-10-CM

## 2019-05-20 ENCOUNTER — HOSPITAL ENCOUNTER (OUTPATIENT)
Dept: CT IMAGING | Age: 84
Discharge: HOME | End: 2019-05-20
Payer: MEDICARE

## 2019-05-20 DIAGNOSIS — C34.12 CANCER OF BRONCHUS OF LEFT UPPER LOBE (HCC): ICD-10-CM

## 2019-05-20 LAB
GFR AFRICAN AMERICAN: 38 ML/MIN/1.73M2
GFR NON-AFRICAN AMERICAN: 31 ML/MIN/1.73M2
POC CREATININE: 2.1 MG/DL (ref 0.9–1.3)

## 2019-05-20 PROCEDURE — 6360000004 HC RX CONTRAST MEDICATION: Performed by: INTERNAL MEDICINE

## 2019-05-20 PROCEDURE — 71250 CT THORAX DX C-: CPT

## 2019-05-31 DIAGNOSIS — I10 ESSENTIAL HYPERTENSION: ICD-10-CM

## 2019-05-31 DIAGNOSIS — E78.2 MIXED HYPERLIPIDEMIA: ICD-10-CM

## 2019-05-31 RX ORDER — SIMVASTATIN 20 MG
20 TABLET ORAL NIGHTLY
Qty: 90 TABLET | Refills: 3 | Status: CANCELLED | OUTPATIENT
Start: 2019-05-31

## 2019-05-31 RX ORDER — QUINAPRIL 40 MG/1
40 TABLET ORAL DAILY
Qty: 90 TABLET | Refills: 3 | Status: SHIPPED | OUTPATIENT
Start: 2019-05-31 | End: 2020-01-27 | Stop reason: SDUPTHER

## 2019-06-18 ENCOUNTER — OFFICE VISIT (OUTPATIENT)
Dept: INTERNAL MEDICINE CLINIC | Age: 84
End: 2019-06-18
Payer: MEDICARE

## 2019-06-18 VITALS
DIASTOLIC BLOOD PRESSURE: 68 MMHG | OXYGEN SATURATION: 97 % | RESPIRATION RATE: 16 BRPM | HEART RATE: 74 BPM | BODY MASS INDEX: 21.59 KG/M2 | WEIGHT: 142 LBS | SYSTOLIC BLOOD PRESSURE: 130 MMHG

## 2019-06-18 DIAGNOSIS — L25.9 CONTACT DERMATITIS, UNSPECIFIED CONTACT DERMATITIS TYPE, UNSPECIFIED TRIGGER: Primary | ICD-10-CM

## 2019-06-18 PROCEDURE — 99213 OFFICE O/P EST LOW 20 MIN: CPT | Performed by: INTERNAL MEDICINE

## 2019-06-18 RX ORDER — FLUOCINONIDE 0.5 MG/G
OINTMENT TOPICAL
Qty: 60 G | Refills: 0 | Status: SHIPPED | OUTPATIENT
Start: 2019-06-18 | End: 2019-06-25

## 2019-06-18 RX ORDER — FLUTICASONE PROPIONATE 50 MCG
2 SPRAY, SUSPENSION (ML) NASAL DAILY
Qty: 1 BOTTLE | Refills: 0 | Status: SHIPPED | OUTPATIENT
Start: 2019-06-18 | End: 2019-08-12 | Stop reason: SDUPTHER

## 2019-06-18 NOTE — PROGRESS NOTES
Leonardo Hamman  1935 06/18/19    SUBJECTIVE:    Since Saturday pt has had an erythematous pruritic rash on the arms bilat. He was trimming a bush last week. He used a cream to help the itch. Pt also complains of a sore throat over the last few days. He does have nasal congestion, rhinorrhea. He denies cough, PND, F/C. He has not treated these symptoms. OBJECTIVE:    /68   Pulse 74   Resp 16   Wt 142 lb (64.4 kg)   SpO2 97%   BMI 21.59 kg/m²     Physical Exam   Constitutional: He appears well-developed and well-nourished. Eyes: Conjunctivae are normal. No scleral icterus. Cardiovascular: Normal rate, regular rhythm and normal heart sounds. No murmur heard. Pulmonary/Chest: Effort normal. No respiratory distress. He has wheezes (scattered). patches of erythema on arms    ASSESSMENT:    1. Contact dermatitis, unspecified contact dermatitis type, unspecified trigger        PLAN:    Cielo Malloy was seen today for rash and other. Diagnoses and all orders for this visit:    Contact dermatitis, unspecified contact dermatitis type, unspecified trigger - look like poison ivy; avoid prednisone because of DM; will use topical steroid  -     fluocinonide (LIDEX) 0.05 % ointment; Apply topically 2 times daily. Other orders - seems to be from PND;  Tx with flonase  -     fluticasone (FLONASE) 50 MCG/ACT nasal spray; 2 sprays by Each Nostril route daily

## 2019-07-03 ENCOUNTER — OFFICE VISIT (OUTPATIENT)
Dept: INTERNAL MEDICINE CLINIC | Age: 84
End: 2019-07-03
Payer: MEDICARE

## 2019-07-03 VITALS
BODY MASS INDEX: 21.29 KG/M2 | SYSTOLIC BLOOD PRESSURE: 128 MMHG | OXYGEN SATURATION: 97 % | DIASTOLIC BLOOD PRESSURE: 68 MMHG | RESPIRATION RATE: 18 BRPM | HEART RATE: 85 BPM | WEIGHT: 140 LBS

## 2019-07-03 DIAGNOSIS — M79.89 SOFT TISSUE MASS: ICD-10-CM

## 2019-07-03 DIAGNOSIS — J02.9 PHARYNGITIS, UNSPECIFIED ETIOLOGY: Primary | ICD-10-CM

## 2019-07-03 PROCEDURE — 99213 OFFICE O/P EST LOW 20 MIN: CPT | Performed by: NURSE PRACTITIONER

## 2019-07-03 RX ORDER — LIDOCAINE HYDROCHLORIDE 20 MG/ML
5 SOLUTION OROPHARYNGEAL EVERY 8 HOURS PRN
Qty: 100 ML | Refills: 0 | Status: SHIPPED | OUTPATIENT
Start: 2019-07-03 | End: 2019-08-12

## 2019-07-03 ASSESSMENT — ENCOUNTER SYMPTOMS
NAUSEA: 0
ABDOMINAL PAIN: 0
EYES NEGATIVE: 1
VOICE CHANGE: 0
SORE THROAT: 1
SHORTNESS OF BREATH: 0
SINUS PAIN: 0
DIARRHEA: 0
CONSTIPATION: 0
SINUS PRESSURE: 0
ABDOMINAL DISTENTION: 0
TROUBLE SWALLOWING: 0
WHEEZING: 0
COUGH: 0
COLOR CHANGE: 0
CHEST TIGHTNESS: 0

## 2019-07-03 NOTE — PROGRESS NOTES
heart sounds. No murmur heard. Pulmonary/Chest: Effort normal and breath sounds normal.   Small, non-tender mass noted to left axillae. No drainage or surrounding erythema. Abdominal: Soft. Bowel sounds are normal. There is no tenderness. Musculoskeletal: Normal range of motion. Neurological: He is alert and oriented to person, place, and time. Coordination normal. GCS eye subscore is 4. GCS verbal subscore is 5. GCS motor subscore is 6. Skin: Skin is warm and dry. Capillary refill takes less than 2 seconds. No rash noted. He is not diaphoretic. Psychiatric: He has a normal mood and affect. His behavior is normal. Thought content normal.       ASSESSMENT:    1. Pharyngitis, unspecified etiology    2. Soft tissue mass        PLAN:    Sergio Prado was seen today for pharyngitis. Diagnoses and all orders for this visit:    Pharyngitis, unspecified etiology- likely residual sypmtom from PND s/p recent viral illness. Exam not concerning for strep/bacterial infection, however, given duration of symptoms will culture to confirm.   -     lidocaine viscous hcl (XYLOCAINE) 2 % SOLN solution; Take 5 mLs by mouth every 8 hours as needed for Other (sore throat)  -     THROAT CULTURE    Soft tissue mass- cyst vs Hydranitis Suprativa. Recommend to see general surgeon to discuss excision, however, patient declines at this time and wishes to observe. No flowsheet data found. Return if symptoms worsen or fail to improve.

## 2019-07-06 LAB — THROAT CULTURE: NORMAL

## 2019-08-12 ENCOUNTER — OFFICE VISIT (OUTPATIENT)
Dept: INTERNAL MEDICINE CLINIC | Age: 84
End: 2019-08-12
Payer: MEDICARE

## 2019-08-12 VITALS
RESPIRATION RATE: 16 BRPM | OXYGEN SATURATION: 97 % | WEIGHT: 146 LBS | SYSTOLIC BLOOD PRESSURE: 134 MMHG | DIASTOLIC BLOOD PRESSURE: 60 MMHG | HEART RATE: 88 BPM | BODY MASS INDEX: 22.2 KG/M2

## 2019-08-12 DIAGNOSIS — J43.9 PULMONARY EMPHYSEMA, UNSPECIFIED EMPHYSEMA TYPE (HCC): ICD-10-CM

## 2019-08-12 DIAGNOSIS — Z79.4 TYPE 2 DIABETES MELLITUS WITHOUT COMPLICATION, WITH LONG-TERM CURRENT USE OF INSULIN (HCC): ICD-10-CM

## 2019-08-12 DIAGNOSIS — K21.9 GASTROESOPHAGEAL REFLUX DISEASE WITHOUT ESOPHAGITIS: ICD-10-CM

## 2019-08-12 DIAGNOSIS — E11.9 TYPE 2 DIABETES MELLITUS WITHOUT COMPLICATION, WITH LONG-TERM CURRENT USE OF INSULIN (HCC): ICD-10-CM

## 2019-08-12 DIAGNOSIS — I10 ESSENTIAL HYPERTENSION: ICD-10-CM

## 2019-08-12 DIAGNOSIS — R09.82 PND (POST-NASAL DRIP): Primary | ICD-10-CM

## 2019-08-12 PROCEDURE — 99214 OFFICE O/P EST MOD 30 MIN: CPT | Performed by: INTERNAL MEDICINE

## 2019-08-12 RX ORDER — ALBUTEROL SULFATE 90 UG/1
2 AEROSOL, METERED RESPIRATORY (INHALATION) EVERY 6 HOURS PRN
Qty: 1 INHALER | Refills: 3 | Status: SHIPPED | OUTPATIENT
Start: 2019-08-12 | End: 2020-01-27 | Stop reason: SDUPTHER

## 2019-08-12 RX ORDER — OMEPRAZOLE 20 MG/1
20 CAPSULE, DELAYED RELEASE ORAL DAILY
Qty: 30 CAPSULE | Refills: 5 | Status: SHIPPED | OUTPATIENT
Start: 2019-08-12 | End: 2020-01-27 | Stop reason: SDUPTHER

## 2019-08-12 RX ORDER — BUDESONIDE AND FORMOTEROL FUMARATE DIHYDRATE 160; 4.5 UG/1; UG/1
2 AEROSOL RESPIRATORY (INHALATION) 2 TIMES DAILY
Qty: 1 INHALER | Refills: 3 | Status: SHIPPED | OUTPATIENT
Start: 2019-08-12 | End: 2020-01-27 | Stop reason: SDUPTHER

## 2019-08-12 RX ORDER — HYDROCHLOROTHIAZIDE 12.5 MG/1
12.5 CAPSULE, GELATIN COATED ORAL DAILY
Qty: 30 CAPSULE | Refills: 5 | Status: SHIPPED | OUTPATIENT
Start: 2019-08-12 | End: 2020-01-27 | Stop reason: SDUPTHER

## 2019-08-12 RX ORDER — FLUTICASONE PROPIONATE 50 MCG
2 SPRAY, SUSPENSION (ML) NASAL DAILY
Qty: 1 BOTTLE | Refills: 5 | Status: SHIPPED | OUTPATIENT
Start: 2019-08-12 | End: 2020-01-27 | Stop reason: SDUPTHER

## 2019-08-12 RX ORDER — AMLODIPINE BESYLATE 5 MG/1
TABLET ORAL
Qty: 30 TABLET | Refills: 5 | Status: SHIPPED | OUTPATIENT
Start: 2019-08-12 | End: 2020-01-27 | Stop reason: SDUPTHER

## 2019-08-12 RX ORDER — SPIRONOLACTONE 25 MG/1
25 TABLET ORAL DAILY
Qty: 30 TABLET | Refills: 5 | Status: SHIPPED | OUTPATIENT
Start: 2019-08-12 | End: 2020-01-27 | Stop reason: SDUPTHER

## 2019-08-12 NOTE — PROGRESS NOTES
Aretha Pearsonmpf  1935 08/12/19    SUBJECTIVE:    Pt complains of PND, sore throat, cough productive intermittently of blood tinged mucous,     He denies any rhinorrhea, nasal congestion, HB, reflux. Patient compliant with medications for diabetes. Blood sugars have averaged around 100, with pt checking blood sugar 1 time daily. Patient has had no episodes of significant hypoglycemia. Patient's COPD well controled on current medications. He does have chronci SOB, denies any wheezing. The patient is taking hypertensive medications compliantly without side effects. Denies chest pain, edema, or TIA's. OBJECTIVE:    /60   Pulse 88   Resp 16   Wt 146 lb (66.2 kg)   SpO2 97%   BMI 22.20 kg/m²     Physical Exam   Constitutional: He is oriented to person, place, and time. He appears well-developed and well-nourished. HENT:   Right Ear: External ear normal.   Left Ear: External ear normal.   Nose: Nose normal.   Mouth/Throat: No oropharyngeal exudate. Eyes: Conjunctivae are normal. No scleral icterus. Neck: Neck supple. No JVD present. No tracheal deviation present. No thyromegaly present. Cardiovascular: Normal rate, regular rhythm, normal heart sounds and intact distal pulses. Pulmonary/Chest: Effort normal. No respiratory distress. He has wheezes. Abdominal: Soft. He exhibits no distension and no mass. There is no hepatosplenomegaly. There is no tenderness. There is no rebound, no guarding and no CVA tenderness. Musculoskeletal: He exhibits no edema. Lymphadenopathy:     He has no cervical adenopathy. Neurological: He is alert and oriented to person, place, and time. Nonfocal   Skin: No cyanosis. Nails show no clubbing. Psychiatric: He has a normal mood and affect. His behavior is normal. Judgment normal.   (+) PND    ASSESSMENT:    1. PND (post-nasal drip)    2. Type 2 diabetes mellitus without complication, with long-term current use of insulin ([address]    3.

## 2019-08-19 ENCOUNTER — OFFICE VISIT (OUTPATIENT)
Dept: INTERNAL MEDICINE CLINIC | Age: 84
End: 2019-08-19
Payer: MEDICARE

## 2019-08-19 VITALS
DIASTOLIC BLOOD PRESSURE: 70 MMHG | OXYGEN SATURATION: 96 % | SYSTOLIC BLOOD PRESSURE: 136 MMHG | HEART RATE: 74 BPM | RESPIRATION RATE: 16 BRPM

## 2019-08-19 DIAGNOSIS — E11.22 CONTROLLED TYPE 2 DIABETES MELLITUS WITH STAGE 3 CHRONIC KIDNEY DISEASE, WITH LONG-TERM CURRENT USE OF INSULIN (HCC): Primary | ICD-10-CM

## 2019-08-19 DIAGNOSIS — K21.9 GASTROESOPHAGEAL REFLUX DISEASE WITHOUT ESOPHAGITIS: ICD-10-CM

## 2019-08-19 DIAGNOSIS — E78.00 HYPERCHOLESTEROLEMIA: ICD-10-CM

## 2019-08-19 DIAGNOSIS — C34.92 SQUAMOUS CELL CARCINOMA OF LEFT LUNG (HCC): ICD-10-CM

## 2019-08-19 DIAGNOSIS — N18.30 CONTROLLED TYPE 2 DIABETES MELLITUS WITH STAGE 3 CHRONIC KIDNEY DISEASE, WITH LONG-TERM CURRENT USE OF INSULIN (HCC): Primary | ICD-10-CM

## 2019-08-19 DIAGNOSIS — Z79.4 CONTROLLED TYPE 2 DIABETES MELLITUS WITH STAGE 3 CHRONIC KIDNEY DISEASE, WITH LONG-TERM CURRENT USE OF INSULIN (HCC): Primary | ICD-10-CM

## 2019-08-19 DIAGNOSIS — I10 ESSENTIAL HYPERTENSION: ICD-10-CM

## 2019-08-19 PROBLEM — J18.9 PNEUMONIA: Status: RESOLVED | Noted: 2018-11-10 | Resolved: 2019-08-19

## 2019-08-19 PROCEDURE — 99214 OFFICE O/P EST MOD 30 MIN: CPT | Performed by: INTERNAL MEDICINE

## 2019-08-19 NOTE — PROGRESS NOTES
Jayashree Gonzalez  1935 08/19/19    SUBJECTIVE:    Patient compliant with medications for diabetes. Blood sugars have averaged around 100, with pt checking blood sugar 1 time daily. Patient has had no episodes of significant hypoglycemia. The patient is taking hypertensive medications compliantly without side effects. Denies chest pain, dyspnea, edema, or TIA's. Patient's COPD well controled on current medications. Patient denies any shortness of breath, wheezing. Patient's reflux well controlled on current medications. Patient denies any blood in stool black stool, heartburn, reflux. Pt has rajan following with Dr Rubin for the lung cancer. OBJECTIVE:    /70   Pulse 74   Resp 16   SpO2 96%     Physical Exam   Constitutional: He is oriented to person, place, and time. He appears well-developed and well-nourished. Eyes: Conjunctivae are normal. No scleral icterus. Neck: Neck supple. No JVD present. No tracheal deviation present. No thyromegaly present. Cardiovascular: Normal rate, regular rhythm and intact distal pulses. Murmur heard. Systolic murmur is present with a grade of 2/6. Pulmonary/Chest: Effort normal. No respiratory distress. He has wheezes (occas). Abdominal: Soft. He exhibits no distension and no mass. There is no hepatosplenomegaly. There is no tenderness. There is no rebound, no guarding and no CVA tenderness. Musculoskeletal: He exhibits no edema. Lymphadenopathy:     He has no cervical adenopathy. Neurological: He is alert and oriented to person, place, and time. Nonfocal   Skin: No cyanosis. Nails show no clubbing. Psychiatric: He has a normal mood and affect. His behavior is normal. Judgment normal.       ASSESSMENT:    1. Controlled type 2 diabetes mellitus with stage 3 chronic kidney disease, with long-term current use of insulin ([address]    2. Essential hypertension    3. Hypercholesterolemia    4.  Squamous cell carcinoma of left lung ([address]

## 2019-09-03 ENCOUNTER — OFFICE VISIT (OUTPATIENT)
Dept: INTERNAL MEDICINE CLINIC | Age: 84
End: 2019-09-03
Payer: MEDICARE

## 2019-09-03 VITALS
DIASTOLIC BLOOD PRESSURE: 60 MMHG | OXYGEN SATURATION: 96 % | HEART RATE: 88 BPM | RESPIRATION RATE: 16 BRPM | SYSTOLIC BLOOD PRESSURE: 110 MMHG

## 2019-09-03 DIAGNOSIS — J02.9 SORE THROAT: Primary | ICD-10-CM

## 2019-09-03 PROCEDURE — 99213 OFFICE O/P EST LOW 20 MIN: CPT | Performed by: INTERNAL MEDICINE

## 2019-09-03 RX ORDER — CETIRIZINE HYDROCHLORIDE 10 MG/1
10 TABLET ORAL DAILY
Qty: 30 TABLET | Refills: 5 | Status: SHIPPED | OUTPATIENT
Start: 2019-09-03 | End: 2020-01-27 | Stop reason: SDUPTHER

## 2019-09-19 ENCOUNTER — OFFICE VISIT (OUTPATIENT)
Dept: INTERNAL MEDICINE CLINIC | Age: 84
End: 2019-09-19
Payer: MEDICARE

## 2019-09-19 VITALS
RESPIRATION RATE: 18 BRPM | DIASTOLIC BLOOD PRESSURE: 88 MMHG | HEART RATE: 69 BPM | OXYGEN SATURATION: 96 % | SYSTOLIC BLOOD PRESSURE: 138 MMHG

## 2019-09-19 DIAGNOSIS — B37.0 THRUSH: Primary | ICD-10-CM

## 2019-09-19 PROCEDURE — 99213 OFFICE O/P EST LOW 20 MIN: CPT | Performed by: INTERNAL MEDICINE

## 2019-11-04 DIAGNOSIS — I10 ESSENTIAL HYPERTENSION: Primary | ICD-10-CM

## 2019-11-04 DIAGNOSIS — E78.00 HYPERCHOLESTEROLEMIA: ICD-10-CM

## 2019-11-04 DIAGNOSIS — E11.22 CONTROLLED TYPE 2 DIABETES MELLITUS WITH STAGE 3 CHRONIC KIDNEY DISEASE, WITH LONG-TERM CURRENT USE OF INSULIN (HCC): ICD-10-CM

## 2019-11-04 DIAGNOSIS — N18.30 CONTROLLED TYPE 2 DIABETES MELLITUS WITH STAGE 3 CHRONIC KIDNEY DISEASE, WITH LONG-TERM CURRENT USE OF INSULIN (HCC): ICD-10-CM

## 2019-11-04 DIAGNOSIS — Z79.4 CONTROLLED TYPE 2 DIABETES MELLITUS WITH STAGE 3 CHRONIC KIDNEY DISEASE, WITH LONG-TERM CURRENT USE OF INSULIN (HCC): ICD-10-CM

## 2019-11-04 DIAGNOSIS — I10 ESSENTIAL HYPERTENSION: ICD-10-CM

## 2019-11-04 LAB
A/G RATIO: 1 (ref 1.1–2.2)
ALBUMIN SERPL-MCNC: 3.6 G/DL (ref 3.4–5)
ALP BLD-CCNC: 128 U/L (ref 40–129)
ALT SERPL-CCNC: 9 U/L (ref 10–40)
ANION GAP SERPL CALCULATED.3IONS-SCNC: 11 MMOL/L (ref 3–16)
AST SERPL-CCNC: 14 U/L (ref 15–37)
BILIRUB SERPL-MCNC: 0.4 MG/DL (ref 0–1)
BUN BLDV-MCNC: 18 MG/DL (ref 7–20)
CALCIUM SERPL-MCNC: 9.9 MG/DL (ref 8.3–10.6)
CHLORIDE BLD-SCNC: 99 MMOL/L (ref 99–110)
CHOLESTEROL, TOTAL: 149 MG/DL (ref 0–199)
CO2: 28 MMOL/L (ref 21–32)
CREAT SERPL-MCNC: 1.4 MG/DL (ref 0.8–1.3)
GFR AFRICAN AMERICAN: 58
GFR NON-AFRICAN AMERICAN: 48
GLOBULIN: 3.7 G/DL
GLUCOSE BLD-MCNC: 276 MG/DL (ref 70–99)
HDLC SERPL-MCNC: 77 MG/DL (ref 40–60)
LDL CHOLESTEROL CALCULATED: 51 MG/DL
POTASSIUM SERPL-SCNC: 4.6 MMOL/L (ref 3.5–5.1)
SODIUM BLD-SCNC: 138 MMOL/L (ref 136–145)
TOTAL PROTEIN: 7.3 G/DL (ref 6.4–8.2)
TRIGL SERPL-MCNC: 105 MG/DL (ref 0–150)
VLDLC SERPL CALC-MCNC: 21 MG/DL

## 2019-11-04 PROCEDURE — 36415 COLL VENOUS BLD VENIPUNCTURE: CPT | Performed by: INTERNAL MEDICINE

## 2019-11-05 LAB
BASOPHILS ABSOLUTE: 0 K/UL (ref 0–0.2)
BASOPHILS RELATIVE PERCENT: 0.3 %
EOSINOPHILS ABSOLUTE: 0.1 K/UL (ref 0–0.6)
EOSINOPHILS RELATIVE PERCENT: 1.4 %
ESTIMATED AVERAGE GLUCOSE: 168.6 MG/DL
HBA1C MFR BLD: 7.5 %
HCT VFR BLD CALC: 49.3 % (ref 40.5–52.5)
HEMOGLOBIN: 16.1 G/DL (ref 13.5–17.5)
LYMPHOCYTES ABSOLUTE: 0.9 K/UL (ref 1–5.1)
LYMPHOCYTES RELATIVE PERCENT: 18.2 %
MCH RBC QN AUTO: 29.4 PG (ref 26–34)
MCHC RBC AUTO-ENTMCNC: 32.6 G/DL (ref 31–36)
MCV RBC AUTO: 90.2 FL (ref 80–100)
MONOCYTES ABSOLUTE: 0.5 K/UL (ref 0–1.3)
MONOCYTES RELATIVE PERCENT: 10.4 %
NEUTROPHILS ABSOLUTE: 3.2 K/UL (ref 1.7–7.7)
NEUTROPHILS RELATIVE PERCENT: 69.7 %
PDW BLD-RTO: 15.1 % (ref 12.4–15.4)
PLATELET # BLD: 234 K/UL (ref 135–450)
PLATELET SLIDE REVIEW: ADEQUATE
PMV BLD AUTO: 9.8 FL (ref 5–10.5)
RBC # BLD: 5.46 M/UL (ref 4.2–5.9)
SLIDE REVIEW: ABNORMAL
WBC # BLD: 4.7 K/UL (ref 4–11)

## 2019-11-08 ENCOUNTER — OFFICE VISIT (OUTPATIENT)
Dept: INTERNAL MEDICINE CLINIC | Age: 84
End: 2019-11-08
Payer: MEDICARE

## 2019-11-08 VITALS
RESPIRATION RATE: 16 BRPM | SYSTOLIC BLOOD PRESSURE: 120 MMHG | WEIGHT: 142.4 LBS | DIASTOLIC BLOOD PRESSURE: 74 MMHG | OXYGEN SATURATION: 94 % | HEART RATE: 92 BPM | BODY MASS INDEX: 21.65 KG/M2

## 2019-11-08 DIAGNOSIS — J43.9 PULMONARY EMPHYSEMA, UNSPECIFIED EMPHYSEMA TYPE (HCC): ICD-10-CM

## 2019-11-08 DIAGNOSIS — J39.2 LESION OF OROPHARYNX: ICD-10-CM

## 2019-11-08 DIAGNOSIS — I35.0 NONRHEUMATIC AORTIC VALVE STENOSIS: ICD-10-CM

## 2019-11-08 DIAGNOSIS — Z01.818 PRE-OP EXAM: Primary | ICD-10-CM

## 2019-11-08 DIAGNOSIS — Z23 NEEDS FLU SHOT: ICD-10-CM

## 2019-11-08 PROCEDURE — 93000 ELECTROCARDIOGRAM COMPLETE: CPT | Performed by: INTERNAL MEDICINE

## 2019-11-08 PROCEDURE — 99213 OFFICE O/P EST LOW 20 MIN: CPT | Performed by: INTERNAL MEDICINE

## 2019-11-13 ENCOUNTER — TELEPHONE (OUTPATIENT)
Dept: INTERNAL MEDICINE CLINIC | Age: 84
End: 2019-11-13

## 2019-11-13 ENCOUNTER — HOSPITAL ENCOUNTER (OUTPATIENT)
Age: 84
Discharge: HOME | End: 2019-11-13
Payer: MEDICARE

## 2019-11-13 ENCOUNTER — HOSPITAL ENCOUNTER (OUTPATIENT)
Dept: GENERAL RADIOLOGY | Age: 84
Discharge: HOME | End: 2019-11-13
Payer: MEDICARE

## 2019-11-13 DIAGNOSIS — J43.2 CENTRILOBULAR EMPHYSEMA (HCC): ICD-10-CM

## 2019-11-13 PROCEDURE — 71046 X-RAY EXAM CHEST 2 VIEWS: CPT

## 2019-12-27 DIAGNOSIS — E11.9 TYPE 2 DIABETES MELLITUS WITHOUT COMPLICATION, WITH LONG-TERM CURRENT USE OF INSULIN (HCC): ICD-10-CM

## 2019-12-27 DIAGNOSIS — Z79.4 TYPE 2 DIABETES MELLITUS WITHOUT COMPLICATION, WITH LONG-TERM CURRENT USE OF INSULIN (HCC): ICD-10-CM

## 2020-01-09 ENCOUNTER — HOSPITAL ENCOUNTER (OUTPATIENT)
Age: 85
Discharge: HOME | End: 2020-01-09
Payer: MEDICARE

## 2020-01-09 LAB
ERYTHROCYTE SEDIMENTATION RATE: 36 MM/HR (ref 0–20)
FOLATE: 10.1 NG/ML (ref 3.1–17.5)
VITAMIN B-12: 1604 PG/ML (ref 211–911)

## 2020-01-09 PROCEDURE — 86038 ANTINUCLEAR ANTIBODIES: CPT

## 2020-01-09 PROCEDURE — 86430 RHEUMATOID FACTOR TEST QUAL: CPT

## 2020-01-09 PROCEDURE — 85652 RBC SED RATE AUTOMATED: CPT

## 2020-01-09 PROCEDURE — 86039 ANTINUCLEAR ANTIBODIES (ANA): CPT

## 2020-01-09 PROCEDURE — 82746 ASSAY OF FOLIC ACID SERUM: CPT

## 2020-01-09 PROCEDURE — 82607 VITAMIN B-12: CPT

## 2020-01-09 PROCEDURE — 36415 COLL VENOUS BLD VENIPUNCTURE: CPT

## 2020-01-12 LAB
ANTI-NUCLEAR ANTIBODY (ANA): ABNORMAL
ANTI-NUCLEAR ANTIBODY (ANA): DETECTED
RHEUMATOID FACTOR: 16 IU/ML (ref 0–14)
RHEUMATOID FACTOR: ABNORMAL IU/ML (ref 0–14)

## 2020-01-13 LAB
ANA PATTERN: NORMAL
ANA TITER: NORMAL
ANA TITER: NORMAL
ANTINUCLEAR ANTIBODY, HEP-2, IGG: DETECTED
INTERPRETATION: ABNORMAL
INTERPRETATION: ABNORMAL

## 2020-01-22 ENCOUNTER — TELEPHONE (OUTPATIENT)
Dept: INTERNAL MEDICINE CLINIC | Age: 85
End: 2020-01-22

## 2020-01-27 ENCOUNTER — OFFICE VISIT (OUTPATIENT)
Dept: INTERNAL MEDICINE CLINIC | Age: 85
End: 2020-01-27
Payer: MEDICARE

## 2020-01-27 VITALS
HEART RATE: 90 BPM | DIASTOLIC BLOOD PRESSURE: 68 MMHG | OXYGEN SATURATION: 95 % | RESPIRATION RATE: 16 BRPM | SYSTOLIC BLOOD PRESSURE: 122 MMHG

## 2020-01-27 PROCEDURE — 99214 OFFICE O/P EST MOD 30 MIN: CPT | Performed by: INTERNAL MEDICINE

## 2020-01-27 RX ORDER — HYDROCHLOROTHIAZIDE 12.5 MG/1
12.5 CAPSULE, GELATIN COATED ORAL DAILY
Qty: 30 CAPSULE | Refills: 5 | Status: ON HOLD
Start: 2020-01-27 | End: 2020-03-15 | Stop reason: HOSPADM

## 2020-01-27 RX ORDER — CETIRIZINE HYDROCHLORIDE 10 MG/1
10 TABLET ORAL DAILY
Qty: 30 TABLET | Refills: 5 | Status: SHIPPED | OUTPATIENT
Start: 2020-01-27 | End: 2020-03-17 | Stop reason: SDUPTHER

## 2020-01-27 RX ORDER — QUINAPRIL 40 MG/1
40 TABLET ORAL DAILY
Qty: 90 TABLET | Refills: 3 | Status: ON HOLD
Start: 2020-01-27 | End: 2020-03-15 | Stop reason: HOSPADM

## 2020-01-27 RX ORDER — LIDOCAINE HYDROCHLORIDE 20 MG/ML
5 SOLUTION OROPHARYNGEAL PRN
Qty: 300 ML | Refills: 0 | Status: SHIPPED | OUTPATIENT
Start: 2020-01-27 | End: 2020-03-11

## 2020-01-27 RX ORDER — FLUTICASONE PROPIONATE 50 MCG
2 SPRAY, SUSPENSION (ML) NASAL DAILY
Qty: 1 BOTTLE | Refills: 5 | Status: SHIPPED | OUTPATIENT
Start: 2020-01-27 | End: 2020-03-13

## 2020-01-27 RX ORDER — ALBUTEROL SULFATE 90 UG/1
2 AEROSOL, METERED RESPIRATORY (INHALATION) EVERY 6 HOURS PRN
Qty: 1 INHALER | Refills: 5 | Status: SHIPPED | OUTPATIENT
Start: 2020-01-27

## 2020-01-27 RX ORDER — AMLODIPINE BESYLATE 5 MG/1
TABLET ORAL
Qty: 30 TABLET | Refills: 5 | Status: ON HOLD
Start: 2020-01-27 | End: 2020-03-15 | Stop reason: HOSPADM

## 2020-01-27 RX ORDER — BUDESONIDE AND FORMOTEROL FUMARATE DIHYDRATE 160; 4.5 UG/1; UG/1
2 AEROSOL RESPIRATORY (INHALATION) 2 TIMES DAILY
Qty: 1 INHALER | Refills: 3 | Status: SHIPPED | OUTPATIENT
Start: 2020-01-27 | End: 2020-03-17 | Stop reason: SDUPTHER

## 2020-01-27 RX ORDER — TRAMADOL HYDROCHLORIDE 50 MG/1
50 TABLET ORAL EVERY 6 HOURS PRN
Qty: 28 TABLET | Refills: 0 | Status: SHIPPED | OUTPATIENT
Start: 2020-01-27 | End: 2020-02-03

## 2020-01-27 RX ORDER — SPIRONOLACTONE 25 MG/1
25 TABLET ORAL DAILY
Qty: 30 TABLET | Refills: 5 | Status: ON HOLD
Start: 2020-01-27 | End: 2020-03-15 | Stop reason: HOSPADM

## 2020-01-27 RX ORDER — OMEPRAZOLE 20 MG/1
20 CAPSULE, DELAYED RELEASE ORAL DAILY
Qty: 30 CAPSULE | Refills: 5 | Status: SHIPPED | OUTPATIENT
Start: 2020-01-27

## 2020-01-27 ASSESSMENT — PATIENT HEALTH QUESTIONNAIRE - PHQ9
SUM OF ALL RESPONSES TO PHQ QUESTIONS 1-9: 0
SUM OF ALL RESPONSES TO PHQ9 QUESTIONS 1 & 2: 0
1. LITTLE INTEREST OR PLEASURE IN DOING THINGS: 0
SUM OF ALL RESPONSES TO PHQ QUESTIONS 1-9: 0
2. FEELING DOWN, DEPRESSED OR HOPELESS: 0

## 2020-01-27 NOTE — PROGRESS NOTES
Pablito Pack  1935 01/27/20     SUBJECTIVE:      Pt has continued to have pain in his throat, was seen by Dr Derik Dawkins. Biopsy was benign. He then had labs which showed an elevated MARIAH. He is scheduled to see Dr Pia De La Garza. Pt has lost 10 pounds because of pain with swallowing. He has been having more difficulties with his breathing - he runs out air. He intermittently has wheezing. He does have some cough that is intermittently productive. In the past there was flecks of blood, but non currently. The cough has improved. He is on both symbicort and ellipta. He was see by Dr Julio Jiang, will have a CT of the chest for the lung cancer. Sugars intermittently elevated depending on PO intake    OBJECTIVE:    /68   Pulse 90   Resp 16   SpO2 95%     Physical Exam  Constitutional:       Appearance: He is well-developed. Eyes:      General: No scleral icterus. Conjunctiva/sclera: Conjunctivae normal.   Cardiovascular:      Rate and Rhythm: Normal rate and regular rhythm. Heart sounds: Normal heart sounds. No murmur. Pulmonary:      Effort: Pulmonary effort is normal. No respiratory distress. Breath sounds: Wheezing present. shallow ulcer on let posterior pharynx. ASSESSMENT:    1. Type 2 diabetes mellitus without complication, with long-term current use of insulin ([address]    2. Sore throat    3. PND (post-nasal drip)    4. Essential hypertension    5. Gastroesophageal reflux disease without esophagitis    6. Pulmonary emphysema, unspecified emphysema type ([address]        PLAN:    Bela Sims was seen today for 6 month follow-up.  30 minute appt today    Diagnoses and all orders for this visit:    Type 2 diabetes mellitus without complication, with long-term current use of insulin ([address] - discussed food choices  -     insulin lispro protamine & lispro (HUMALOG MIX 75/25) (75-25) 100 UNIT per ML SUSP injection vial; Use 22 units in the morning with breakfast and 18 units in the evening with dinner. Sore throat - will add viscous lidocaine and ultra PRN; await results of biopsy; encourage PO to help maintain weight  -     cetirizine (ZYRTEC) 10 MG tablet; Take 1 tablet by mouth daily  -     traMADol (ULTRAM) 50 MG tablet; Take 1 tablet by mouth every 6 hours as needed for Pain for up to 7 days. Intended supply: 5 days. Take lowest dose possible to manage pain    PND (post-nasal drip)  -     fluticasone (FLONASE) 50 MCG/ACT nasal spray; 2 sprays by Each Nostril route daily    Essential hypertension - at goal; no change   -     amLODIPine (NORVASC) 5 MG tablet; TAKE ONE TABLET BY MOUTH EVERY MORNING  -     spironolactone (ALDACTONE) 25 MG tablet; Take 1 tablet by mouth daily  -     quinapril (ACCUPRIL) 40 MG tablet; Take 1 tablet by mouth daily TAKE ONE TABLET BY MOUTH EVERY MORNING    Gastroesophageal reflux disease without esophagitis  -     omeprazole (PRILOSEC) 20 MG delayed release capsule; Take 1 capsule by mouth daily    Pulmonary emphysema, unspecified emphysema type (HCC) - cont inhalers, f/u with pulm  -     budesonide-formoterol (SYMBICORT) 160-4.5 MCG/ACT AERO; Inhale 2 puffs into the lungs 2 times daily  -     albuterol sulfate HFA (PROAIR HFA) 108 (90 Base) MCG/ACT inhaler; Inhale 2 puffs into the lungs every 6 hours as needed for Wheezing    Other orders  -     hydrochlorothiazide (MICROZIDE) 12.5 MG capsule; Take 1 capsule by mouth daily  -     Umeclidinium Bromide (INCRUSE ELLIPTA) 62.5 MCG/INH AEPB; Inhale 1 puff into the lungs Daily  -     lidocaine viscous hcl (XYLOCAINE) 2 % SOLN solution;  Take 5 mLs by mouth as needed for Irritation

## 2020-02-07 ENCOUNTER — HOSPITAL ENCOUNTER (OUTPATIENT)
Dept: INFUSION THERAPY | Age: 85
Discharge: HOME | End: 2020-02-07
Payer: MEDICARE

## 2020-02-07 LAB
ALBUMIN SERPL-MCNC: 3.3 GM/DL (ref 3.4–5)
ALP BLD-CCNC: 116 IU/L (ref 40–128)
ALT SERPL-CCNC: 11 U/L (ref 10–40)
ANION GAP SERPL CALCULATED.3IONS-SCNC: 10 MMOL/L (ref 4–16)
AST SERPL-CCNC: 12 IU/L (ref 15–37)
BILIRUB SERPL-MCNC: 0.3 MG/DL (ref 0–1)
BUN BLDV-MCNC: 20 MG/DL (ref 6–23)
CALCIUM SERPL-MCNC: 9.2 MG/DL (ref 8.3–10.6)
CHLORIDE BLD-SCNC: 97 MMOL/L (ref 99–110)
CO2: 27 MMOL/L (ref 21–32)
CREAT SERPL-MCNC: 1.4 MG/DL (ref 0.9–1.3)
DIFFERENTIAL TYPE: ABNORMAL
EOSINOPHILS ABSOLUTE: 0.1 K/CU MM
EOSINOPHILS RELATIVE PERCENT: 1 % (ref 0–3)
GFR AFRICAN AMERICAN: 58 ML/MIN/1.73M2
GFR NON-AFRICAN AMERICAN: 48 ML/MIN/1.73M2
GLUCOSE BLD-MCNC: 299 MG/DL (ref 70–99)
HCT VFR BLD CALC: 42 % (ref 42–52)
HEMOGLOBIN: 13.8 GM/DL (ref 13.5–18)
LYMPHOCYTES ABSOLUTE: 0.8 K/CU MM
LYMPHOCYTES RELATIVE PERCENT: 11 % (ref 24–44)
MCH RBC QN AUTO: 28.3 PG (ref 27–31)
MCHC RBC AUTO-ENTMCNC: 32.9 % (ref 32–36)
MCV RBC AUTO: 86.1 FL (ref 78–100)
MONOCYTES ABSOLUTE: 0.7 K/CU MM
MONOCYTES RELATIVE PERCENT: 10 % (ref 0–4)
PDW BLD-RTO: 16.3 % (ref 11.7–14.9)
PLATELET # BLD: 323 K/CU MM (ref 140–440)
PMV BLD AUTO: 10 FL (ref 7.5–11.1)
POTASSIUM SERPL-SCNC: 4.3 MMOL/L (ref 3.5–5.1)
RBC # BLD: 4.88 M/CU MM (ref 4.6–6.2)
SEGMENTED NEUTROPHILS ABSOLUTE COUNT: 5.7 K/CU MM
SEGMENTED NEUTROPHILS RELATIVE PERCENT: 78 % (ref 36–66)
SODIUM BLD-SCNC: 134 MMOL/L (ref 135–145)
TOTAL PROTEIN: 6.7 GM/DL (ref 6.4–8.2)
WBC # BLD: 7.3 K/CU MM (ref 4–10.5)

## 2020-02-07 PROCEDURE — 36415 COLL VENOUS BLD VENIPUNCTURE: CPT

## 2020-02-07 PROCEDURE — 85025 COMPLETE CBC W/AUTO DIFF WBC: CPT

## 2020-02-07 PROCEDURE — 80053 COMPREHEN METABOLIC PANEL: CPT

## 2020-02-28 ENCOUNTER — OFFICE VISIT (OUTPATIENT)
Dept: INTERNAL MEDICINE CLINIC | Age: 85
End: 2020-02-28
Payer: MEDICARE

## 2020-02-28 VITALS
OXYGEN SATURATION: 93 % | HEART RATE: 107 BPM | DIASTOLIC BLOOD PRESSURE: 64 MMHG | SYSTOLIC BLOOD PRESSURE: 130 MMHG | BODY MASS INDEX: 18.94 KG/M2 | WEIGHT: 125 LBS | HEIGHT: 68 IN

## 2020-02-28 PROCEDURE — 99213 OFFICE O/P EST LOW 20 MIN: CPT | Performed by: INTERNAL MEDICINE

## 2020-02-28 RX ORDER — PREDNISONE 10 MG/1
TABLET ORAL
Qty: 20 TABLET | Refills: 0 | Status: ON HOLD
Start: 2020-02-28 | End: 2020-03-06 | Stop reason: HOSPADM

## 2020-02-28 RX ORDER — AZITHROMYCIN 250 MG/1
250 TABLET, FILM COATED ORAL SEE ADMIN INSTRUCTIONS
Qty: 6 TABLET | Refills: 0 | Status: SHIPPED | OUTPATIENT
Start: 2020-02-28 | End: 2020-03-03 | Stop reason: ALTCHOICE

## 2020-02-28 RX ORDER — MIRTAZAPINE 15 MG/1
15 TABLET, FILM COATED ORAL NIGHTLY
Qty: 30 TABLET | Refills: 3 | Status: SHIPPED | OUTPATIENT
Start: 2020-02-28 | End: 2020-04-01 | Stop reason: SDUPTHER

## 2020-02-28 NOTE — PROGRESS NOTES
malignancy, hitesh with him not having the throat pain any longer but still losing weight. Will start remeron, f/u in 2 weeks  -     mirtazapine (REMERON) 15 MG tablet;  Take 1 tablet by mouth nightly    Instructions written out for pt to share with his daughter

## 2020-03-03 ENCOUNTER — HOSPITAL ENCOUNTER (OUTPATIENT)
Dept: CT IMAGING | Age: 85
Discharge: HOME | DRG: 194 | End: 2020-03-03
Payer: MEDICARE

## 2020-03-03 ENCOUNTER — HOSPITAL ENCOUNTER (INPATIENT)
Age: 85
LOS: 3 days | Discharge: HOME | DRG: 194 | End: 2020-03-06
Attending: INTERNAL MEDICINE | Admitting: INTERNAL MEDICINE
Payer: MEDICARE

## 2020-03-03 ENCOUNTER — OFFICE VISIT (OUTPATIENT)
Dept: INTERNAL MEDICINE CLINIC | Age: 85
End: 2020-03-03
Payer: MEDICARE

## 2020-03-03 ENCOUNTER — HOSPITAL ENCOUNTER (OUTPATIENT)
Age: 85
Discharge: HOME | DRG: 194 | End: 2020-03-03
Payer: MEDICARE

## 2020-03-03 VITALS
WEIGHT: 120 LBS | SYSTOLIC BLOOD PRESSURE: 142 MMHG | OXYGEN SATURATION: 97 % | RESPIRATION RATE: 18 BRPM | HEART RATE: 111 BPM | DIASTOLIC BLOOD PRESSURE: 84 MMHG | BODY MASS INDEX: 18.25 KG/M2

## 2020-03-03 PROBLEM — R09.02 HYPOXIA: Status: ACTIVE | Noted: 2020-03-03

## 2020-03-03 LAB
ALBUMIN SERPL-MCNC: 3.5 GM/DL (ref 3.4–5)
ALP BLD-CCNC: 120 IU/L (ref 40–129)
ALT SERPL-CCNC: 12 U/L (ref 10–40)
ANION GAP SERPL CALCULATED.3IONS-SCNC: 14 MMOL/L (ref 4–16)
AST SERPL-CCNC: 8 IU/L (ref 15–37)
BANDED NEUTROPHILS ABSOLUTE COUNT: 0.85 K/CU MM
BANDED NEUTROPHILS RELATIVE PERCENT: 7 % (ref 5–11)
BILIRUB SERPL-MCNC: 0.4 MG/DL (ref 0–1)
BUN BLDV-MCNC: 33 MG/DL (ref 6–23)
CALCIUM SERPL-MCNC: 10.9 MG/DL (ref 8.3–10.6)
CHLORIDE BLD-SCNC: 92 MMOL/L (ref 99–110)
CO2: 27 MMOL/L (ref 21–32)
CREAT SERPL-MCNC: 1.5 MG/DL (ref 0.9–1.3)
DIFFERENTIAL TYPE: ABNORMAL
EOSINOPHILS ABSOLUTE: 0.1 K/CU MM
EOSINOPHILS RELATIVE PERCENT: 1 % (ref 0–3)
ESTIMATED AVERAGE GLUCOSE: 263 MG/DL
GFR AFRICAN AMERICAN: 54 ML/MIN/1.73M2
GFR NON-AFRICAN AMERICAN: 45 ML/MIN/1.73M2
GLUCOSE BLD-MCNC: 441 MG/DL (ref 70–99)
GLUCOSE BLD-MCNC: 521 MG/DL (ref 70–99)
GLUCOSE BLD-MCNC: 560 MG/DL (ref 70–99)
HBA1C MFR BLD: 10.8 % (ref 4.2–6.3)
HCT VFR BLD CALC: 47 % (ref 42–52)
HEMOGLOBIN: 15.1 GM/DL (ref 13.5–18)
LYMPHOCYTES ABSOLUTE: 0.4 K/CU MM
LYMPHOCYTES RELATIVE PERCENT: 3 % (ref 24–44)
MCH RBC QN AUTO: 28.4 PG (ref 27–31)
MCHC RBC AUTO-ENTMCNC: 32.1 % (ref 32–36)
MCV RBC AUTO: 88.3 FL (ref 78–100)
MONOCYTES ABSOLUTE: 1 K/CU MM
MONOCYTES RELATIVE PERCENT: 8 % (ref 0–4)
PDW BLD-RTO: 15.2 % (ref 11.7–14.9)
PLATELET # BLD: 428 K/CU MM (ref 140–440)
PMV BLD AUTO: 10.7 FL (ref 7.5–11.1)
POTASSIUM SERPL-SCNC: 5 MMOL/L (ref 3.5–5.1)
RBC # BLD: 5.32 M/CU MM (ref 4.6–6.2)
SEGMENTED NEUTROPHILS ABSOLUTE COUNT: 9.8 K/CU MM
SEGMENTED NEUTROPHILS RELATIVE PERCENT: 81 % (ref 36–66)
SODIUM BLD-SCNC: 133 MMOL/L (ref 135–145)
TOTAL PROTEIN: 7.4 GM/DL (ref 6.4–8.2)
WBC # BLD: 12.1 K/CU MM (ref 4–10.5)

## 2020-03-03 PROCEDURE — 70450 CT HEAD/BRAIN W/O DYE: CPT

## 2020-03-03 PROCEDURE — 6370000000 HC RX 637 (ALT 250 FOR IP): Performed by: INTERNAL MEDICINE

## 2020-03-03 PROCEDURE — 99214 OFFICE O/P EST MOD 30 MIN: CPT | Performed by: INTERNAL MEDICINE

## 2020-03-03 PROCEDURE — 71260 CT THORAX DX C+: CPT

## 2020-03-03 PROCEDURE — 6360000004 HC RX CONTRAST MEDICATION: Performed by: INTERNAL MEDICINE

## 2020-03-03 PROCEDURE — 2580000003 HC RX 258: Performed by: INTERNAL MEDICINE

## 2020-03-03 PROCEDURE — 83036 HEMOGLOBIN GLYCOSYLATED A1C: CPT

## 2020-03-03 PROCEDURE — 1200000000 HC SEMI PRIVATE

## 2020-03-03 PROCEDURE — 80053 COMPREHEN METABOLIC PANEL: CPT

## 2020-03-03 PROCEDURE — 85027 COMPLETE CBC AUTOMATED: CPT

## 2020-03-03 PROCEDURE — 82962 GLUCOSE BLOOD TEST: CPT

## 2020-03-03 PROCEDURE — 36415 COLL VENOUS BLD VENIPUNCTURE: CPT

## 2020-03-03 PROCEDURE — 85007 BL SMEAR W/DIFF WBC COUNT: CPT

## 2020-03-03 PROCEDURE — 6360000002 HC RX W HCPCS: Performed by: INTERNAL MEDICINE

## 2020-03-03 RX ORDER — SODIUM CHLORIDE 0.9 % (FLUSH) 0.9 %
10 SYRINGE (ML) INJECTION EVERY 12 HOURS SCHEDULED
Status: DISCONTINUED | OUTPATIENT
Start: 2020-03-03 | End: 2020-03-06 | Stop reason: HOSPADM

## 2020-03-03 RX ORDER — LISINOPRIL 20 MG/1
20 TABLET ORAL DAILY
Status: DISCONTINUED | OUTPATIENT
Start: 2020-03-03 | End: 2020-03-06 | Stop reason: HOSPADM

## 2020-03-03 RX ORDER — AMLODIPINE BESYLATE 5 MG/1
5 TABLET ORAL DAILY
Status: DISCONTINUED | OUTPATIENT
Start: 2020-03-03 | End: 2020-03-06 | Stop reason: HOSPADM

## 2020-03-03 RX ORDER — HYDROCHLOROTHIAZIDE 12.5 MG/1
12.5 TABLET ORAL DAILY
Status: DISCONTINUED | OUTPATIENT
Start: 2020-03-03 | End: 2020-03-06 | Stop reason: HOSPADM

## 2020-03-03 RX ORDER — ALBUTEROL SULFATE 2.5 MG/3ML
2.5 SOLUTION RESPIRATORY (INHALATION)
Status: DISCONTINUED | OUTPATIENT
Start: 2020-03-03 | End: 2020-03-06 | Stop reason: HOSPADM

## 2020-03-03 RX ORDER — 0.9 % SODIUM CHLORIDE 0.9 %
500 INTRAVENOUS SOLUTION INTRAVENOUS ONCE
Status: COMPLETED | OUTPATIENT
Start: 2020-03-03 | End: 2020-03-03

## 2020-03-03 RX ORDER — DEXTROSE MONOHYDRATE 25 G/50ML
12.5 INJECTION, SOLUTION INTRAVENOUS PRN
Status: DISCONTINUED | OUTPATIENT
Start: 2020-03-03 | End: 2020-03-06 | Stop reason: HOSPADM

## 2020-03-03 RX ORDER — PANTOPRAZOLE SODIUM 40 MG/1
40 TABLET, DELAYED RELEASE ORAL
Status: DISCONTINUED | OUTPATIENT
Start: 2020-03-04 | End: 2020-03-06 | Stop reason: HOSPADM

## 2020-03-03 RX ORDER — DEXTROSE MONOHYDRATE 50 MG/ML
100 INJECTION, SOLUTION INTRAVENOUS PRN
Status: DISCONTINUED | OUTPATIENT
Start: 2020-03-03 | End: 2020-03-06 | Stop reason: HOSPADM

## 2020-03-03 RX ORDER — PROMETHAZINE HYDROCHLORIDE 25 MG/1
12.5 TABLET ORAL EVERY 6 HOURS PRN
Status: DISCONTINUED | OUTPATIENT
Start: 2020-03-03 | End: 2020-03-06 | Stop reason: HOSPADM

## 2020-03-03 RX ORDER — NICOTINE POLACRILEX 4 MG
15 LOZENGE BUCCAL PRN
Status: DISCONTINUED | OUTPATIENT
Start: 2020-03-03 | End: 2020-03-06 | Stop reason: HOSPADM

## 2020-03-03 RX ORDER — INSULIN GLARGINE 100 [IU]/ML
20 INJECTION, SOLUTION SUBCUTANEOUS NIGHTLY
Status: DISCONTINUED | OUTPATIENT
Start: 2020-03-03 | End: 2020-03-06 | Stop reason: HOSPADM

## 2020-03-03 RX ORDER — POLYETHYLENE GLYCOL 3350 17 G/17G
17 POWDER, FOR SOLUTION ORAL DAILY PRN
Status: DISCONTINUED | OUTPATIENT
Start: 2020-03-03 | End: 2020-03-06 | Stop reason: HOSPADM

## 2020-03-03 RX ORDER — MORPHINE SULFATE 2 MG/ML
2 INJECTION, SOLUTION INTRAMUSCULAR; INTRAVENOUS EVERY 4 HOURS PRN
Status: DISCONTINUED | OUTPATIENT
Start: 2020-03-03 | End: 2020-03-06 | Stop reason: HOSPADM

## 2020-03-03 RX ORDER — ALBUTEROL SULFATE 90 UG/1
2 AEROSOL, METERED RESPIRATORY (INHALATION) EVERY 6 HOURS PRN
Status: DISCONTINUED | OUTPATIENT
Start: 2020-03-03 | End: 2020-03-06 | Stop reason: HOSPADM

## 2020-03-03 RX ORDER — BUDESONIDE AND FORMOTEROL FUMARATE DIHYDRATE 160; 4.5 UG/1; UG/1
2 AEROSOL RESPIRATORY (INHALATION) 2 TIMES DAILY
Status: DISCONTINUED | OUTPATIENT
Start: 2020-03-03 | End: 2020-03-06 | Stop reason: HOSPADM

## 2020-03-03 RX ORDER — SODIUM CHLORIDE 9 MG/ML
INJECTION, SOLUTION INTRAVENOUS CONTINUOUS
Status: DISCONTINUED | OUTPATIENT
Start: 2020-03-03 | End: 2020-03-06 | Stop reason: HOSPADM

## 2020-03-03 RX ORDER — CETIRIZINE HYDROCHLORIDE 10 MG/1
10 TABLET ORAL DAILY
Status: DISCONTINUED | OUTPATIENT
Start: 2020-03-03 | End: 2020-03-06 | Stop reason: HOSPADM

## 2020-03-03 RX ORDER — ACETAMINOPHEN 325 MG/1
650 TABLET ORAL EVERY 6 HOURS PRN
Status: DISCONTINUED | OUTPATIENT
Start: 2020-03-03 | End: 2020-03-06 | Stop reason: HOSPADM

## 2020-03-03 RX ORDER — CLOPIDOGREL BISULFATE 75 MG/1
75 TABLET ORAL DAILY
Status: DISCONTINUED | OUTPATIENT
Start: 2020-03-03 | End: 2020-03-05

## 2020-03-03 RX ORDER — ACETAMINOPHEN 650 MG/1
650 SUPPOSITORY RECTAL EVERY 6 HOURS PRN
Status: DISCONTINUED | OUTPATIENT
Start: 2020-03-03 | End: 2020-03-06 | Stop reason: HOSPADM

## 2020-03-03 RX ORDER — ALBUTEROL SULFATE 2.5 MG/3ML
2.5 SOLUTION RESPIRATORY (INHALATION)
Status: DISCONTINUED | OUTPATIENT
Start: 2020-03-03 | End: 2020-03-04

## 2020-03-03 RX ORDER — SODIUM CHLORIDE 0.9 % (FLUSH) 0.9 %
10 SYRINGE (ML) INJECTION PRN
Status: DISCONTINUED | OUTPATIENT
Start: 2020-03-03 | End: 2020-03-06 | Stop reason: HOSPADM

## 2020-03-03 RX ORDER — ONDANSETRON 2 MG/ML
4 INJECTION INTRAMUSCULAR; INTRAVENOUS EVERY 6 HOURS PRN
Status: DISCONTINUED | OUTPATIENT
Start: 2020-03-03 | End: 2020-03-06 | Stop reason: HOSPADM

## 2020-03-03 RX ORDER — SODIUM CHLORIDE 0.9 % (FLUSH) 0.9 %
10 SYRINGE (ML) INJECTION PRN
Status: DISCONTINUED | OUTPATIENT
Start: 2020-03-03 | End: 2020-03-03 | Stop reason: SDUPTHER

## 2020-03-03 RX ADMIN — CETIRIZINE HYDROCHLORIDE 10 MG: 10 TABLET, FILM COATED ORAL at 19:03

## 2020-03-03 RX ADMIN — AZITHROMYCIN MONOHYDRATE 500 MG: 500 INJECTION, POWDER, LYOPHILIZED, FOR SOLUTION INTRAVENOUS at 19:54

## 2020-03-03 RX ADMIN — INSULIN LISPRO 10 UNITS: 100 INJECTION, SOLUTION INTRAVENOUS; SUBCUTANEOUS at 18:53

## 2020-03-03 RX ADMIN — SODIUM CHLORIDE 500 ML: 9 INJECTION, SOLUTION INTRAVENOUS at 19:50

## 2020-03-03 RX ADMIN — CEFTRIAXONE 1 G: 1 INJECTION, POWDER, FOR SOLUTION INTRAMUSCULAR; INTRAVENOUS at 21:10

## 2020-03-03 RX ADMIN — HYDROCHLOROTHIAZIDE 12.5 MG: 12.5 TABLET ORAL at 19:03

## 2020-03-03 RX ADMIN — LISINOPRIL 20 MG: 20 TABLET ORAL at 19:03

## 2020-03-03 RX ADMIN — CLOPIDOGREL BISULFATE 75 MG: 75 TABLET ORAL at 19:03

## 2020-03-03 RX ADMIN — IOPAMIDOL 75 ML: 755 INJECTION, SOLUTION INTRAVENOUS at 14:18

## 2020-03-03 RX ADMIN — Medication 10 ML: at 14:18

## 2020-03-03 RX ADMIN — INSULIN GLARGINE 20 UNITS: 100 INJECTION, SOLUTION SUBCUTANEOUS at 21:18

## 2020-03-03 RX ADMIN — ENOXAPARIN SODIUM 30 MG: 30 INJECTION SUBCUTANEOUS at 19:06

## 2020-03-03 RX ADMIN — AMLODIPINE BESYLATE 5 MG: 5 TABLET ORAL at 19:03

## 2020-03-03 RX ADMIN — SODIUM CHLORIDE: 9 INJECTION, SOLUTION INTRAVENOUS at 21:10

## 2020-03-03 ASSESSMENT — PAIN SCALES - GENERAL
PAINLEVEL_OUTOF10: 0

## 2020-03-03 NOTE — PROGRESS NOTES
Marquis Hadley  1935 03/03/20    SUBJECTIVE:    Pt complains of pain in the center of his back, and in general. His balance is getting worse, and he is getting weaker. Blood sugars have been 230-300. Wheezing has improved but he continues to have SOB. Energy has been poor. He has used tramadol for the pain. OBJECTIVE:    BP (!) 142/84   Pulse 111   Resp 18   Wt 120 lb (54.4 kg)   SpO2 97%   BMI 18.25 kg/m²     Physical Exam  Constitutional:       Appearance: He is well-developed. Eyes:      General: No scleral icterus. Conjunctiva/sclera: Conjunctivae normal.   Neck:      Musculoskeletal: Neck supple. Thyroid: No thyromegaly. Vascular: No JVD. Trachea: No tracheal deviation. Cardiovascular:      Rate and Rhythm: Normal rate and regular rhythm. Heart sounds: Normal heart sounds. Pulmonary:      Effort: Pulmonary effort is normal. No respiratory distress. Breath sounds: Normal breath sounds. Abdominal:      General: There is no distension. Palpations: Abdomen is soft. There is no mass. Tenderness: There is no abdominal tenderness. There is no guarding or rebound. Musculoskeletal:         General: Swelling (trace bilat) present. Lymphadenopathy:      Cervical: No cervical adenopathy. Skin:     Nails: There is no clubbing. Neurological:      Mental Status: He is alert and oriented to person, place, and time. Comments: Nonfocal   Psychiatric:         Behavior: Behavior normal.         Judgment: Judgment normal.     Pain with palpation of right posterior chest wall; no pain with palpation of anterior chest wall. ASSESSMENT:    1. Squamous cell carcinoma of left lung ([address]    2. Weight loss    3. Confusion        PLAN:    Bethany Romero was seen today for back pain, generalized body aches, other and shortness of breath.     Diagnoses and all orders for this visit:    Squamous cell carcinoma of left lung ([address] - I am concerned that the pts worsening

## 2020-03-04 ENCOUNTER — TELEPHONE (OUTPATIENT)
Dept: INTERNAL MEDICINE CLINIC | Age: 85
End: 2020-03-04

## 2020-03-04 PROBLEM — E43 SEVERE MALNUTRITION (HCC): Chronic | Status: ACTIVE | Noted: 2020-03-04

## 2020-03-04 LAB
ADENOVIRUS DETECTION BY PCR: NOT DETECTED
ALBUMIN SERPL-MCNC: 2.8 GM/DL (ref 3.4–5)
ALP BLD-CCNC: 96 IU/L (ref 40–128)
ALT SERPL-CCNC: 10 U/L (ref 10–40)
ANION GAP SERPL CALCULATED.3IONS-SCNC: 11 MMOL/L (ref 4–16)
AST SERPL-CCNC: 10 IU/L (ref 15–37)
BASOPHILS ABSOLUTE: 0 K/CU MM
BASOPHILS RELATIVE PERCENT: 0.1 % (ref 0–1)
BILIRUB SERPL-MCNC: 0.2 MG/DL (ref 0–1)
BORDETELLA PERTUSSIS PCR: NOT DETECTED
BUN BLDV-MCNC: 32 MG/DL (ref 6–23)
CALCIUM SERPL-MCNC: 10.2 MG/DL (ref 8.3–10.6)
CHLAMYDOPHILA PNEUMONIA PCR: NOT DETECTED
CHLORIDE BLD-SCNC: 101 MMOL/L (ref 99–110)
CO2: 27 MMOL/L (ref 21–32)
CORONAVIRUS 229E PCR: NOT DETECTED
CORONAVIRUS HKU1 PCR: NOT DETECTED
CORONAVIRUS NL63 PCR: NOT DETECTED
CORONAVIRUS OC43 PCR: NOT DETECTED
CREAT SERPL-MCNC: 1.3 MG/DL (ref 0.9–1.3)
DIFFERENTIAL TYPE: ABNORMAL
EOSINOPHILS ABSOLUTE: 0 K/CU MM
EOSINOPHILS RELATIVE PERCENT: 0.2 % (ref 0–3)
GFR AFRICAN AMERICAN: >60 ML/MIN/1.73M2
GFR NON-AFRICAN AMERICAN: 53 ML/MIN/1.73M2
GLUCOSE BLD-MCNC: 100 MG/DL (ref 70–99)
GLUCOSE BLD-MCNC: 112 MG/DL (ref 70–99)
GLUCOSE BLD-MCNC: 145 MG/DL (ref 70–99)
GLUCOSE BLD-MCNC: 160 MG/DL (ref 70–99)
GLUCOSE BLD-MCNC: 180 MG/DL (ref 70–99)
HCT VFR BLD CALC: 43.5 % (ref 42–52)
HEMOGLOBIN: 14.2 GM/DL (ref 13.5–18)
HUMAN METAPNEUMOVIRUS PCR: NOT DETECTED
IMMATURE NEUTROPHIL %: 0.4 % (ref 0–0.43)
INFLUENZA A BY PCR: NOT DETECTED
INFLUENZA A H1 (2009) PCR: NOT DETECTED
INFLUENZA A H1 PANDEMIC PCR: NOT DETECTED
INFLUENZA A H3 PCR: NOT DETECTED
INFLUENZA B BY PCR: NOT DETECTED
LYMPHOCYTES ABSOLUTE: 0.9 K/CU MM
LYMPHOCYTES RELATIVE PERCENT: 9.6 % (ref 24–44)
MCH RBC QN AUTO: 28.4 PG (ref 27–31)
MCHC RBC AUTO-ENTMCNC: 32.6 % (ref 32–36)
MCV RBC AUTO: 87 FL (ref 78–100)
MONOCYTES ABSOLUTE: 0.7 K/CU MM
MONOCYTES RELATIVE PERCENT: 7.5 % (ref 0–4)
MYCOPLASMA PNEUMONIAE PCR: NOT DETECTED
NUCLEATED RBC %: 0 %
PARAINFLUENZA 1 PCR: NOT DETECTED
PARAINFLUENZA 2 PCR: NOT DETECTED
PARAINFLUENZA 3 PCR: NOT DETECTED
PARAINFLUENZA 4 PCR: NOT DETECTED
PDW BLD-RTO: 14.8 % (ref 11.7–14.9)
PLATELET # BLD: 374 K/CU MM (ref 140–440)
PMV BLD AUTO: 10.2 FL (ref 7.5–11.1)
POTASSIUM SERPL-SCNC: 4.1 MMOL/L (ref 3.5–5.1)
RBC # BLD: 5 M/CU MM (ref 4.6–6.2)
RHINOVIRUS ENTEROVIRUS PCR: NOT DETECTED
RSV PCR: NOT DETECTED
SEGMENTED NEUTROPHILS ABSOLUTE COUNT: 7.6 K/CU MM
SEGMENTED NEUTROPHILS RELATIVE PERCENT: 82.2 % (ref 36–66)
SODIUM BLD-SCNC: 139 MMOL/L (ref 135–145)
TOTAL IMMATURE NEUTOROPHIL: 0.04 K/CU MM
TOTAL NUCLEATED RBC: 0 K/CU MM
TOTAL PROTEIN: 5.9 GM/DL (ref 6.4–8.2)
WBC # BLD: 9.2 K/CU MM (ref 4–10.5)

## 2020-03-04 PROCEDURE — 94668 MNPJ CHEST WALL SBSQ: CPT

## 2020-03-04 PROCEDURE — 85025 COMPLETE CBC W/AUTO DIFF WBC: CPT

## 2020-03-04 PROCEDURE — 94761 N-INVAS EAR/PLS OXIMETRY MLT: CPT

## 2020-03-04 PROCEDURE — 87798 DETECT AGENT NOS DNA AMP: CPT

## 2020-03-04 PROCEDURE — 94640 AIRWAY INHALATION TREATMENT: CPT

## 2020-03-04 PROCEDURE — 6370000000 HC RX 637 (ALT 250 FOR IP): Performed by: INTERNAL MEDICINE

## 2020-03-04 PROCEDURE — 94664 DEMO&/EVAL PT USE INHALER: CPT

## 2020-03-04 PROCEDURE — 2580000003 HC RX 258: Performed by: INTERNAL MEDICINE

## 2020-03-04 PROCEDURE — 82962 GLUCOSE BLOOD TEST: CPT

## 2020-03-04 PROCEDURE — 6360000002 HC RX W HCPCS: Performed by: INTERNAL MEDICINE

## 2020-03-04 PROCEDURE — 1200000000 HC SEMI PRIVATE

## 2020-03-04 PROCEDURE — 99223 1ST HOSP IP/OBS HIGH 75: CPT | Performed by: INTERNAL MEDICINE

## 2020-03-04 PROCEDURE — 36415 COLL VENOUS BLD VENIPUNCTURE: CPT

## 2020-03-04 PROCEDURE — 87581 M.PNEUMON DNA AMP PROBE: CPT

## 2020-03-04 PROCEDURE — 94010 BREATHING CAPACITY TEST: CPT

## 2020-03-04 PROCEDURE — 87633 RESP VIRUS 12-25 TARGETS: CPT

## 2020-03-04 PROCEDURE — 80053 COMPREHEN METABOLIC PANEL: CPT

## 2020-03-04 PROCEDURE — 99222 1ST HOSP IP/OBS MODERATE 55: CPT | Performed by: INTERNAL MEDICINE

## 2020-03-04 PROCEDURE — 94667 MNPJ CHEST WALL 1ST: CPT

## 2020-03-04 PROCEDURE — 87486 CHLMYD PNEUM DNA AMP PROBE: CPT

## 2020-03-04 RX ORDER — GUAIFENESIN 600 MG/1
600 TABLET, EXTENDED RELEASE ORAL 2 TIMES DAILY
Status: DISCONTINUED | OUTPATIENT
Start: 2020-03-04 | End: 2020-03-06 | Stop reason: HOSPADM

## 2020-03-04 RX ORDER — IPRATROPIUM BROMIDE AND ALBUTEROL SULFATE 2.5; .5 MG/3ML; MG/3ML
1 SOLUTION RESPIRATORY (INHALATION)
Status: DISCONTINUED | OUTPATIENT
Start: 2020-03-04 | End: 2020-03-06 | Stop reason: HOSPADM

## 2020-03-04 RX ADMIN — GUAIFENESIN 600 MG: 600 TABLET, EXTENDED RELEASE ORAL at 20:21

## 2020-03-04 RX ADMIN — CETIRIZINE HYDROCHLORIDE 10 MG: 10 TABLET, FILM COATED ORAL at 08:59

## 2020-03-04 RX ADMIN — LISINOPRIL 20 MG: 20 TABLET ORAL at 08:59

## 2020-03-04 RX ADMIN — IPRATROPIUM BROMIDE AND ALBUTEROL SULFATE 1 AMPULE: .5; 3 SOLUTION RESPIRATORY (INHALATION) at 12:08

## 2020-03-04 RX ADMIN — AZITHROMYCIN MONOHYDRATE 500 MG: 500 INJECTION, POWDER, LYOPHILIZED, FOR SOLUTION INTRAVENOUS at 20:22

## 2020-03-04 RX ADMIN — CEFTRIAXONE 1 G: 1 INJECTION, POWDER, FOR SOLUTION INTRAMUSCULAR; INTRAVENOUS at 17:17

## 2020-03-04 RX ADMIN — ENOXAPARIN SODIUM 30 MG: 30 INJECTION SUBCUTANEOUS at 08:59

## 2020-03-04 RX ADMIN — INSULIN LISPRO 10 UNITS: 100 INJECTION, SOLUTION INTRAVENOUS; SUBCUTANEOUS at 17:23

## 2020-03-04 RX ADMIN — ALBUTEROL SULFATE 2.5 MG: 2.5 SOLUTION RESPIRATORY (INHALATION) at 08:39

## 2020-03-04 RX ADMIN — AMLODIPINE BESYLATE 5 MG: 5 TABLET ORAL at 08:59

## 2020-03-04 RX ADMIN — GUAIFENESIN 600 MG: 600 TABLET, EXTENDED RELEASE ORAL at 11:18

## 2020-03-04 RX ADMIN — HYDROCHLOROTHIAZIDE 12.5 MG: 12.5 TABLET ORAL at 08:59

## 2020-03-04 RX ADMIN — BUDESONIDE AND FORMOTEROL FUMARATE DIHYDRATE 2 PUFF: 160; 4.5 AEROSOL RESPIRATORY (INHALATION) at 21:46

## 2020-03-04 RX ADMIN — IPRATROPIUM BROMIDE AND ALBUTEROL SULFATE 1 AMPULE: .5; 3 SOLUTION RESPIRATORY (INHALATION) at 16:16

## 2020-03-04 RX ADMIN — BUDESONIDE AND FORMOTEROL FUMARATE DIHYDRATE 2 PUFF: 160; 4.5 AEROSOL RESPIRATORY (INHALATION) at 08:41

## 2020-03-04 RX ADMIN — CLOPIDOGREL BISULFATE 75 MG: 75 TABLET ORAL at 08:59

## 2020-03-04 RX ADMIN — INSULIN GLARGINE 20 UNITS: 100 INJECTION, SOLUTION SUBCUTANEOUS at 20:21

## 2020-03-04 RX ADMIN — IPRATROPIUM BROMIDE AND ALBUTEROL SULFATE 1 AMPULE: .5; 3 SOLUTION RESPIRATORY (INHALATION) at 21:46

## 2020-03-04 RX ADMIN — PANTOPRAZOLE SODIUM 40 MG: 40 TABLET, DELAYED RELEASE ORAL at 06:09

## 2020-03-04 RX ADMIN — INSULIN LISPRO 10 UNITS: 100 INJECTION, SOLUTION INTRAVENOUS; SUBCUTANEOUS at 11:27

## 2020-03-04 RX ADMIN — SODIUM CHLORIDE: 9 INJECTION, SOLUTION INTRAVENOUS at 17:17

## 2020-03-04 ASSESSMENT — PAIN SCALES - GENERAL: PAINLEVEL_OUTOF10: 0

## 2020-03-04 NOTE — CONSULTS
Nutrition Assessment    Type and Reason for Visit: Initial, Consult(poor intake/appetite)    Nutrition Recommendations:   · Continue Carb Control 4 Diet  · Begin diabetic oral nutrition supplement tid, between meals as needed  · Will continue to follow course    Nutrition Assessment: Severe malnutrition 2/2 poor intake/appetite, recurrence of lung CA. A1C 10.8. Fair intake here so far, consuming greater than half of Carb Control 4 Diet. Willing to drink oral supplement here as he does at home, will order. Provided/briefly reviewed High Protein/Calorie Nutrition Therapy (Nutrition Care Manual). No additional questions re diet for home at this time. Malnutrition Assessment:  · Malnutrition Status: Meets the criteria for severe malnutrition  · Context: Chronic illness  · Findings of the 6 clinical characteristics of malnutrition (Minimum of 2 out of 6 clinical characteristics is required to make the diagnosis of moderate or severe Protein Calorie Malnutrition based on AND/ASPEN Guidelines):  1. Energy Intake-Less than or equal to 75% of estimated energy requirement, Greater than or equal to 1 month    2. Weight Loss-10% loss or greater, (in 4 months)  3. Fat Loss-Severe subcutaneous fat loss, Orbital  4. Muscle Loss-Severe muscle mass loss, Interosseous, Clavicles (pectoralis and deltoids), Temples (temporalis muscle)  5. Fluid Accumulation-Mild fluid accumulation, Extremities  6.  Strength-Not measured    Nutrition Risk Level:  Moderate    Nutrient Needs:  · Estimated Daily Total Kcal: 2674-5939 (30-35 kcal/kg)  · Estimated Daily Protein (g):  (1.2-1.5 g/kg IBW)  · Estimated Daily Total Fluid (ml/day): 0384-7253 (1 ml/kcal)    Nutrition Diagnosis:   · Problem: Severe malnutrition, In context of chronic illness  · Etiology: related to Catabolic illness, Insufficient energy/nutrient consumption     Signs and symptoms:  as evidenced by Diet history of poor intake, BMI, Weight loss, Severe loss of subcutaneous fat, Severe muscle loss    Objective Information:  · Wound Type: None  · Current Nutrition Therapies:  · Oral Diet Orders: Carb Control 4 Carbs/Meal   · Oral Diet intake: 51-75%  · Oral Nutrition Supplement (ONS) Orders: None  · Anthropometric Measures:  · Ht: 5' 8\" (172.7 cm)   · Current Body Wt: 119 lb (54 kg)  · Admission Body Wt: 116 lb 1.6 oz (52.7 kg)  · Usual Body Wt: 142 lb 6.4 oz (64.6 kg)(11/8/19)  · % Weight Change:  -16% over past 4 months  · Ideal Body Wt: 154 lb (69.9 kg), % Ideal Body 77  · BMI Classification: BMI <18.5 Underweight(18.1)    Nutrition Interventions:   Continue current diet, Start ONS  Continued Inpatient Monitoring, Education Initiated, Coordination of Care    Nutrition Evaluation:   · Evaluation: Goals set   · Goals:   Patient will meet at least 75% of estimated nutrient needs during los with meals and supplements provided    · Monitoring: Meal Intake, Supplement Intake, Diet Tolerance, Weight, Pertinent Labs      Electronically signed by Jeannette Keane RD, LD on 3/4/20 at 12:23 PM    Contact Number: [phone]

## 2020-03-04 NOTE — CARE COORDINATION
[address] Transitions Interview     3/4/2020    Patient: Christopher Benson Patient : 1935   MRN: [id number]  Reason for Admission:   SOB  RARS: Readmission Risk Score: 16         Spoke with:  patient      Readmission Risk  Patient Active Problem List   Diagnosis    Essential hypertension    Hypercholesterolemia    Controlled type 2 diabetes mellitus with stage 3 chronic kidney disease, with long-term current use of insulin ([facility]    Pulmonary emphysema ([facility]    Nonrheumatic aortic valve stenosis    Squamous cell carcinoma of left lung ([facility] [place] [address]    Pneumonia of right middle lobe due to infectious organism [facility])    Hypoxia       Inpatient Assessment  Care Transitions Summary    Care Transitions Inpatient Review  Medication Review  Are you able to afford your medications?:  Yes  How often do you have difficulty taking your medications?:  I always take them as prescribed. Housing Review  Who do you live with?:  Alone  Social Support  Do you have a ?:  No  Do you have a [facility]?:  No  Durable Medical Equipment  Functional Review  Hearing and Vision  Care Transitions Interventions                                 Follow Up:  Spoke with patient with permission to speak his family present. Oriented to the role of Care Transition with business card given. Patient reports that prior to admission he lived alone and was independent with ADL's. Patient confirms that his daughter Roberth Valles)  is his POA and is agreeable to contact with her to discuss care at discharge. Patient is independent , has a PCP, has no issue with transportation or with the cost of his medications. Patient is hopeful to return home; but his discharge plan is pending at this time. Patient is agreeable to continued Care Transition. Confirmed CTN contact information. Encouraged call back if needs arise. Spoke with Provider's office.   Hospital follow up appointment scheduled 3/11 @  10 am.

## 2020-03-04 NOTE — CARE COORDINATION
Patient discharged home with [address] on 3/6/2020    CN met with patient on 3/4, patient sitting up eating lunch and sister in room. Education provided, patient states he still drives and gets around well. Initial Patient Assessment Note    What symptoms brought you to the hospital? Patient came in with increased sob and wheezing. Was seen in the office of PCP and started on Azithromycin and prednisone but continued to worsen and having issues with ambulation and pain in center of back and right anterior chest. Was a direct admit from the PCP    CT:  Innumerable new pulmonary nodule and masses throughout the right upper lobe   with confluent mass in the anterior aspect of the right upper lobe as well as   multiple right apical spiculated pulmonary masses and nodules consistent with   new metastatic disease.  Interlobular septal thickening and ground-glass also   in the right upper lobe may represent lymphangitic spread of tumor and/or   postobstructive changes.       Extensive new right paratracheal, precarinal, right hilar and subcarinal   bulky lymphadenopathy consistent with new metastatic disease.       Post treatment changes of the left lung are stable.  Stable left lower lobe   bronchiectasis and mucus plugging.          Where do you live:       [x] Home alone       [] Independent Living     [] Assisted Living                       [] [address]   [] Homeless/Homeless Shelter    [] Group Home      Primary Care Physician:  [x] Dr Sheri Leonard                         [] None    [] PA/NP              [] [facility] Physician              Pulmonary Physician:    Consulted:  [] Emperatriz Velarde     [] Yes   [] Shanelle     [] No  [] Rangingwala              [] Bahadur:    [] Other:      Pharmacy:      Meds to Beds:  [] Felix Garcia      [] Yes   [] [facility]      [x] No   [] Efraín Joseph   [x] Arsh Moeller      [] [facility]    [] [facility]   [] [facility]   [] [facility]   [] [place]   [] [facility] Zam[facility], [facility]   [] [facility]   [] [facility]   [] [facility]   [] RA      Consults:             Smoking Cessation        NA  Pulmonary         Yes  Med Assist Consult        No  Home Health Care Consult       No  Palliative Care Consult       No  Respiratory Consult        Yes  (including bedside instructions on nebulizers, inhalers, and assessment of oxygen and equipment needs at home)      Discharge:             Pneumococcal Vaccine given before discharge     NA 2018  Flu Vaccine given before discharge      NA 2019  Inhalers          Yes  Spacer          Yes  Steroids          No  Follow-up appointment scheduled within 5-7 days    Yes  Cardio/Pulmonary Wellness program consult     NA  Home Oxygen         No  Neblizer, bipap, cpap at home       No  Home air quality assessment [facility]

## 2020-03-04 NOTE — CONSULTS
Reveals that he quit smoking in 2015, but used to smoke  a pack per day for 68 years prior to that. He drinks alcohol off and  on. No history of drug use. MEDICATIONS:  Reviewed. ALLERGIES:  He is allergic to aspirin. REVIEW OF SYSTEMS:  10- to 14-point review of systems were reviewed and  are negative except for what is mentioned in the history of present  illness. PHYSICAL EXAMINATION:  GENERAL:  The patient is alert and oriented x3, in no acute respiratory  distress. VITAL SIGNS:  His blood pressure is 118/62 mmHg, pulse of 96 per minute,  and respiratory rate of 16 per minute. He is afebrile. His saturation  is 94% on room air. HEENT:  Essentially unremarkable. There is no JVD. NECK:  Supple. LUNGS:  Revealed diminished breath sounds and occasional rhonchi. HEART:  Showed normal S1 and S2. There was no S3 or S4 noted. ABDOMEN:  Benign. There is no evidence of any organomegaly. The bowel  sounds are present. NEUROLOGIC:  Reveals that he is awake and responsive, answering  questions appropriately, and moving his extremities. LABORATORY DATA:  His CBC showed a white count of 9.2, hemoglobin 14.2,  and hematocrit 43.5. His electrolytes were unremarkable. His CAT scan  of the chest done yesterday showed several pulmonary nodules and masses  throughout the right upper lobe with a confluent mass in the anterior  aspect of the right upper lobe, interlobular septal thickening  suggestive of lymphangitic spread and extensive new right paratracheal  and precarinal and right hilar and subcarinal lymphadenopathy. Left  lower lobe bronchiectasis with some evidence of mucus plugging. IMPRESSION:  1. Acute exacerbation of COPD. 2.  Most likely the patient has metastatic lung cancer. 3.  Bronchiectasis. 4.  History of tobacco abuse. PLAN:  1. Continue present antibiotic therapy. 2.  EzPAP with DuoNeb. 3.  We will add Mucinex 600 mg twice a day. 4.  We will check CEA level.   5.  The

## 2020-03-04 NOTE — DISCHARGE INSTR - OTHER ORDERS
Follow-up With  Details  Why  Contact Info   Ata Ahumada MD  Go on 3/11/2020  Hospital follow up: 10 am  [address]

## 2020-03-04 NOTE — H&P
Darien Tamayo MD  History and Physical    Patient:  Manfred Estrada    CHIEF COMPLAINT:  SOB    HISTORY OF PRESENT ILLNESS:   The patient is a 80 y.o. male who presents with SOB. Patient was seen in the office last week complaining of increased shortness of breath and wheezing. He was started on azithromycin and prednisone, but despite this he continued to struggle with his breathing although this improved a little bit. His daughter also notes that he was having increased difficulties with his ambulation And was complaining of pain in the center of his back as well as his right anterior chest.  He has been using tramadol for his pain. Patient has a history of lung cancer status post Left upper lobe resection in 2016. Recently he has been losing weight. Initially the seventh this was because of a poorly hearing sore in his mouth, but even when this was successfully treated he continued to lose weight. Patient was seen in the office on the day of admission and sent for stat labs and CT scans. CT scan of the brain showed an old infarct which was new from the prior CT. Labs showed markedly elevated glucose of 520 otherwise fairly benign. CT of the chest showed significant lymphadenopathy and multiple nodules throughout the right lung. There were also areas of bronchiectasis so patient was admitted and started on IV antibiotics. This morning he feels somewhat better.     Past Medical History:        Diagnosis Date    CAD (coronary artery disease)     Dr. Aldo Malik [facility] lung [facility] 3/21/2016    Dr. Von Aponte Diabetes mellitus [facility]) Dx 1970's    type II    Heart murmur, systolic     [place] (hard of hearing)     Bilateral Ears    Hyperlipidemia     Hypertension     follow with Dr Kaylyn Hahn Dx 12-31-15    Sees Dr. Von Aponte Lung mass     scheduled for lung bx 12/31/2015/ Dr. Bismark Klein Teeth missing     Upper And Lower    Thyroid disease     CT Guided Biopsy Left Thyroid Mass Done 3-4-16    Wears glasses        Past Surgical History:        Procedure Laterality Date    CARDIAC CATHETERIZATION      per old chart pt had cardiac cath and right femoral artery angiogram done 11/2008    DENTAL SURGERY      Teeth Extracted In Past    LUNG SURGERY Left 03/16/2016    Robotic assisted thorocotomy, DARLENE, node sampling and right supraclavicular lymph node biopsy    OTHER SURGICAL HISTORY  3-4-16    CT Guided Biopsy Left Thyroid Mass    OTHER SURGICAL HISTORY  12-31-15     CT Guided Lung Biopsy Done 12-31-15    TONSILLECTOMY  1960's       Medications Prior to Admission:    Prior to Admission medications    Medication Sig Start Date End Date Taking? Authorizing Provider   predniSONE (DELTASONE) 10 MG tablet Take 4 tablets daily for 2 days, then 3 tablets daily for 2 days, then two tablets daily for 2 days, then one tablet daily for 2 days 2/28/20   Cecy Yi MD   mirtazapine (REMERON) 15 MG tablet Take 1 tablet by mouth nightly 2/28/20   Cecy Yi MD   tiotropium (SPIRIVA RESPIMAT) 2.5 MCG/ACT AERS inhaler Inhale 2 puffs into the lungs daily 1/30/20   Cecy Yi MD   insulin lispro protamine & lispro (HUMALOG MIX 75/25) (75-25) 100 UNIT per ML SUSP injection vial Use 22 units in the morning with breakfast and 18 units in the evening with dinner.  1/27/20   Cecy Yi MD   cetirizine (ZYRTEC) 10 MG tablet Take 1 tablet by mouth daily 1/27/20   Cecy Yi MD   fluticasone [facility]) 50 MCG/ACT nasal spray 2 sprays by Each Nostril route daily 1/27/20   Cecy Yi MD   hydrochlorothiazide (MICROZIDE) 12.5 MG capsule Take 1 capsule by mouth daily 1/27/20   Cecy Yi MD   amLODIPine (NORVASC) 5 MG tablet TAKE ONE TABLET BY MOUTH EVERY MORNING 1/27/20   Cecy Yi MD   omeprazole (PRILOSEC) 20 MG delayed release capsule Take 1 capsule by mouth daily 1/27/20   Cecy Yi MD (!) 100/59   Pulse 90   Temp 98.2 °F (36.8 °C) (Oral)   Resp 28   Ht 5' 8\" (1.727 m)   Wt 119 lb (54 kg)   SpO2 97%   BMI 18.09 kg/m²   General appearance: appears stated age and cooperative  Skin: Skin color, texture, turgor normal. No rashes or lesions. HEENT: No scleral icterus. Conjunctiva pink. Mucous membranes moist. No gum lesions, tongue lesions. Neck: Supple, trachea midline. No JVD. No thyromegaly, no masses. Lungs: Appropriate chest expansion. Scattered wheezes  Heart: Regular rate and rhythm with normal S1 and S2. 2/6 systolic murmur. Carotids are brisk bilaterally. Abdomen:  Soft, non-tender, non-distended. There is no organomegaly, no masses. Bowel sounds are normo-active  Extremities: No cyanosis, clubbing, or edema. Lymphatic: No cervical or supraclavicular lymphadenopathy  Vascular: Dorsalis pedis and posterior tibial pulses 2+ bilaterally  Neurologic: Grossly nonfocal  Psych: Alert and oriented, insight and judgement WNL; no depression or anxiety         Assessment and Plan   Principal Problem:    Pneumonia of right middle lobe due to infectious organism [facility])  Active Problems:    Controlled type 2 diabetes mellitus with stage 3 chronic kidney disease, with long-term current use of insulin (HCC)    Pulmonary emphysema (HCC)    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)    Hypoxia  Resolved Problems:    * No resolved hospital problems. *    Patient is a 51-year-old male with history of squamous cell lung cancer admitted with pneumonia and hyperglycemia found to have what seems to be recurrence of his squamous cell cancer. I have started him on antibiotics though I am not giving him any more steroids because of his hyperglycemia. Of note, sugars have already improved overnight and this morning. I am not sure she will be a candidate for further treatment given his overall deconditioning, but nevertheless I will be consulting oncology and pulmonology.   I will continue him on his chronic medications. Further management will depend on the patient's hospital course.     [facility] [address]

## 2020-03-04 NOTE — CONSULTS
ONCOLOGY HEMATOLOGY CARE ([facility])  CONSULTATION REPORT    3/4/2020  6:22 PM    Patient:    Krunal Griffith  : 1935   80 y. o. MRN: [id number]  Admitted: 3/3/2020  5:06 PM ATT: Al Lauren, *   [id number]  AdmitDx: Hypoxia [R09.02]  PCP: Ruben Francis MD    Reason for Consult:     Requesting Physician:  Al Lauren, *      History Obtained From:  review of all records      CHIEF COMPLAINT    SOB    HISTORY OF PRESENT ILLNESS   81 YO male admitted with sx of increased sob, productive cough. Poor appetite and weight loss. Unsteady gait. Also c/o back pain. BACKGROUND ONCOLOGY HISTORY:  Diagnosed in 2015. Surgical resection in 2017, followed by adjuvant chemotherapy with Taxol/carbo for four courses and then radiation therapy to the locally resected disease in mediastinum because of positive margins, which was completed in 2017. Has been in good clinical remission since then.    may 2019 CT chest with  no recurrence   Missed CT chest apt 2019 and 2020        PAST MEDICAL HISTORY:  1. Hypertension for many, many years. 2. Diabetes since .  3. Hyperlipidemia.,  4. Hard of hearing. PAST SURGICAL HISTORY:  Tonsillectomy, cardiac cath, and lung biopsy, as mentioned. PERSONAL HISTORY:  He has a 50+ pack year history of smoking, quit two months ago. He denies alcohol use. He is a . His wife  of liver cancer 14 years ago. FAMILY HISTORY:  One daughter, Raine Mcgrath,  of breast cancer. Two daughters, [facility] AYAKA - [place] and Isabela Lance, who live around in this area, came with him. Sister had uterine cancer, brother diabetes. Mother had heart disease and also, brother had heart disease.     one of his daughters just passed away this past saturday         REVIEW OF SYSTEMS    Constitutional:  Denies fever, chills, tiredness, fatigue or weakness   HEENT:  Denies swelling of neck glands  Respiratory:  Denies changes.       Extensive new right paratracheal, precarinal, right hilar and subcarinal   bulky lymphadenopathy consistent with new metastatic disease.       Post treatment changes of the left lung are stable.  Stable left lower lobe   bronchiectasis and mucus plugging.           CT scan of the head:  1. No acute intracranial abnormality. 2. New encephalomalacia in the right frontal lobe related to interim   infarction.  Additional chronic changes as above. IMPRESSION & RECOMMENDATIONS:    H/O lung cancer: course as above. Now with right  pulm nodules and rt lad. Recommend biopsy for further diagnosis for further treatment planning. Consider MRI brain if neurological sx continue. Recommend molecular studies if recurring NSCLC    Mil;d RI: adequate hydration    Improve nutritional status    Continue other medical care    ECOG Performance Status (ECOG Scale 0-4): 2-3    This plan was discussed with the patient and he verbalized understanding. We will continue to follow the patient. Thank you for allowing us to participate in the care of this patient.      Efrem Sanchez MD, 3/4/2020, 6:22 PM

## 2020-03-04 NOTE — CARE COORDINATION
Chart reviewed, in to see pt for dc planning. Introduced self and board updated. Pt was admitted for SOB. States he lives in [facility] apartments alone and is typically independent with ADLs. States he will have new [facility] at discharge. Explained he follows with PCP, has insurance though not all co-pays are affordable and reliable transportation per his daughter/Waldemar. Patient is agreeable to Med Assist referral for ongoing medication cost. States he may need cane or walker. CM following.

## 2020-03-05 LAB
ALBUMIN SERPL-MCNC: 2.6 GM/DL (ref 3.4–5)
ALP BLD-CCNC: 96 IU/L (ref 40–128)
ALT SERPL-CCNC: 11 U/L (ref 10–40)
ANION GAP SERPL CALCULATED.3IONS-SCNC: 9 MMOL/L (ref 4–16)
AST SERPL-CCNC: 16 IU/L (ref 15–37)
BASOPHILS ABSOLUTE: 0 K/CU MM
BASOPHILS RELATIVE PERCENT: 0.2 % (ref 0–1)
BILIRUB SERPL-MCNC: 0.3 MG/DL (ref 0–1)
BUN BLDV-MCNC: 23 MG/DL (ref 6–23)
CALCIUM SERPL-MCNC: 10.1 MG/DL (ref 8.3–10.6)
CEA: 10.6 NG/ML
CHLORIDE BLD-SCNC: 102 MMOL/L (ref 99–110)
CO2: 27 MMOL/L (ref 21–32)
CREAT SERPL-MCNC: 1.3 MG/DL (ref 0.9–1.3)
DIFFERENTIAL TYPE: ABNORMAL
EOSINOPHILS ABSOLUTE: 0 K/CU MM
EOSINOPHILS RELATIVE PERCENT: 0.5 % (ref 0–3)
GFR AFRICAN AMERICAN: >60 ML/MIN/1.73M2
GFR NON-AFRICAN AMERICAN: 53 ML/MIN/1.73M2
GLUCOSE BLD-MCNC: 102 MG/DL (ref 70–99)
GLUCOSE BLD-MCNC: 159 MG/DL (ref 70–99)
GLUCOSE BLD-MCNC: 186 MG/DL (ref 70–99)
GLUCOSE BLD-MCNC: 242 MG/DL (ref 70–99)
GLUCOSE BLD-MCNC: 262 MG/DL (ref 70–99)
GLUCOSE BLD-MCNC: 58 MG/DL (ref 70–99)
GLUCOSE BLD-MCNC: 63 MG/DL (ref 70–99)
GLUCOSE BLD-MCNC: 70 MG/DL (ref 70–99)
HCT VFR BLD CALC: 42.6 % (ref 42–52)
HEMOGLOBIN: 13.5 GM/DL (ref 13.5–18)
IMMATURE NEUTROPHIL %: 0.3 % (ref 0–0.43)
INR BLD: 1.06 INDEX
LYMPHOCYTES ABSOLUTE: 1 K/CU MM
LYMPHOCYTES RELATIVE PERCENT: 11.6 % (ref 24–44)
MAGNESIUM: 2 MG/DL (ref 1.8–2.4)
MCH RBC QN AUTO: 28.1 PG (ref 27–31)
MCHC RBC AUTO-ENTMCNC: 31.7 % (ref 32–36)
MCV RBC AUTO: 88.6 FL (ref 78–100)
MONOCYTES ABSOLUTE: 0.7 K/CU MM
MONOCYTES RELATIVE PERCENT: 8.6 % (ref 0–4)
NUCLEATED RBC %: 0 %
PDW BLD-RTO: 15.2 % (ref 11.7–14.9)
PHOSPHORUS: 2.6 MG/DL (ref 2.5–4.9)
PLATELET # BLD: 368 K/CU MM (ref 140–440)
PMV BLD AUTO: 10.6 FL (ref 7.5–11.1)
POTASSIUM SERPL-SCNC: 3.9 MMOL/L (ref 3.5–5.1)
PRO-BNP: 323.9 PG/ML
PROCALCITONIN: 0.15
PROTHROMBIN TIME: 12.8 SECONDS (ref 11.7–14.5)
RBC # BLD: 4.81 M/CU MM (ref 4.6–6.2)
SEGMENTED NEUTROPHILS ABSOLUTE COUNT: 6.8 K/CU MM
SEGMENTED NEUTROPHILS RELATIVE PERCENT: 78.8 % (ref 36–66)
SODIUM BLD-SCNC: 138 MMOL/L (ref 135–145)
TOTAL IMMATURE NEUTOROPHIL: 0.03 K/CU MM
TOTAL NUCLEATED RBC: 0 K/CU MM
TOTAL PROTEIN: 5.8 GM/DL (ref 6.4–8.2)
WBC # BLD: 8.6 K/CU MM (ref 4–10.5)

## 2020-03-05 PROCEDURE — 94668 MNPJ CHEST WALL SBSQ: CPT

## 2020-03-05 PROCEDURE — 94761 N-INVAS EAR/PLS OXIMETRY MLT: CPT

## 2020-03-05 PROCEDURE — 99232 SBSQ HOSP IP/OBS MODERATE 35: CPT | Performed by: INTERNAL MEDICINE

## 2020-03-05 PROCEDURE — 6370000000 HC RX 637 (ALT 250 FOR IP): Performed by: INTERNAL MEDICINE

## 2020-03-05 PROCEDURE — 97165 OT EVAL LOW COMPLEX 30 MIN: CPT

## 2020-03-05 PROCEDURE — 85610 PROTHROMBIN TIME: CPT

## 2020-03-05 PROCEDURE — 2580000003 HC RX 258: Performed by: INTERNAL MEDICINE

## 2020-03-05 PROCEDURE — 82378 CARCINOEMBRYONIC ANTIGEN: CPT

## 2020-03-05 PROCEDURE — 97535 SELF CARE MNGMENT TRAINING: CPT

## 2020-03-05 PROCEDURE — 83880 ASSAY OF NATRIURETIC PEPTIDE: CPT

## 2020-03-05 PROCEDURE — 97162 PT EVAL MOD COMPLEX 30 MIN: CPT

## 2020-03-05 PROCEDURE — 94640 AIRWAY INHALATION TREATMENT: CPT

## 2020-03-05 PROCEDURE — 82962 GLUCOSE BLOOD TEST: CPT

## 2020-03-05 PROCEDURE — 84145 PROCALCITONIN (PCT): CPT

## 2020-03-05 PROCEDURE — 97116 GAIT TRAINING THERAPY: CPT

## 2020-03-05 PROCEDURE — 80053 COMPREHEN METABOLIC PANEL: CPT

## 2020-03-05 PROCEDURE — 6360000002 HC RX W HCPCS: Performed by: INTERNAL MEDICINE

## 2020-03-05 PROCEDURE — 84100 ASSAY OF PHOSPHORUS: CPT

## 2020-03-05 PROCEDURE — 36415 COLL VENOUS BLD VENIPUNCTURE: CPT

## 2020-03-05 PROCEDURE — 1200000000 HC SEMI PRIVATE

## 2020-03-05 PROCEDURE — 83735 ASSAY OF MAGNESIUM: CPT

## 2020-03-05 PROCEDURE — 85025 COMPLETE CBC W/AUTO DIFF WBC: CPT

## 2020-03-05 RX ADMIN — PANTOPRAZOLE SODIUM 40 MG: 40 TABLET, DELAYED RELEASE ORAL at 06:44

## 2020-03-05 RX ADMIN — INSULIN LISPRO 5 UNITS: 100 INJECTION, SOLUTION INTRAVENOUS; SUBCUTANEOUS at 11:36

## 2020-03-05 RX ADMIN — BUDESONIDE AND FORMOTEROL FUMARATE DIHYDRATE 2 PUFF: 160; 4.5 AEROSOL RESPIRATORY (INHALATION) at 20:27

## 2020-03-05 RX ADMIN — CETIRIZINE HYDROCHLORIDE 10 MG: 10 TABLET, FILM COATED ORAL at 08:57

## 2020-03-05 RX ADMIN — AMLODIPINE BESYLATE 5 MG: 5 TABLET ORAL at 08:57

## 2020-03-05 RX ADMIN — SODIUM CHLORIDE: 9 INJECTION, SOLUTION INTRAVENOUS at 06:46

## 2020-03-05 RX ADMIN — BUDESONIDE AND FORMOTEROL FUMARATE DIHYDRATE 2 PUFF: 160; 4.5 AEROSOL RESPIRATORY (INHALATION) at 07:31

## 2020-03-05 RX ADMIN — GUAIFENESIN 600 MG: 600 TABLET, EXTENDED RELEASE ORAL at 08:57

## 2020-03-05 RX ADMIN — IPRATROPIUM BROMIDE AND ALBUTEROL SULFATE 1 AMPULE: .5; 3 SOLUTION RESPIRATORY (INHALATION) at 20:25

## 2020-03-05 RX ADMIN — GUAIFENESIN 600 MG: 600 TABLET, EXTENDED RELEASE ORAL at 21:21

## 2020-03-05 RX ADMIN — AZITHROMYCIN MONOHYDRATE 500 MG: 500 INJECTION, POWDER, LYOPHILIZED, FOR SOLUTION INTRAVENOUS at 18:42

## 2020-03-05 RX ADMIN — HYDROCHLOROTHIAZIDE 12.5 MG: 12.5 TABLET ORAL at 08:56

## 2020-03-05 RX ADMIN — CEFTRIAXONE 1 G: 1 INJECTION, POWDER, FOR SOLUTION INTRAMUSCULAR; INTRAVENOUS at 17:37

## 2020-03-05 RX ADMIN — ENOXAPARIN SODIUM 30 MG: 30 INJECTION SUBCUTANEOUS at 08:57

## 2020-03-05 RX ADMIN — LISINOPRIL 20 MG: 20 TABLET ORAL at 08:57

## 2020-03-05 RX ADMIN — INSULIN LISPRO 5 UNITS: 100 INJECTION, SOLUTION INTRAVENOUS; SUBCUTANEOUS at 09:57

## 2020-03-05 RX ADMIN — CLOPIDOGREL BISULFATE 75 MG: 75 TABLET ORAL at 08:56

## 2020-03-05 RX ADMIN — INSULIN GLARGINE 20 UNITS: 100 INJECTION, SOLUTION SUBCUTANEOUS at 21:21

## 2020-03-05 RX ADMIN — IPRATROPIUM BROMIDE AND ALBUTEROL SULFATE 1 AMPULE: .5; 3 SOLUTION RESPIRATORY (INHALATION) at 07:31

## 2020-03-05 ASSESSMENT — PAIN SCALES - GENERAL
PAINLEVEL_OUTOF10: 0

## 2020-03-05 NOTE — PLAN OF CARE
Problem: SAFETY  Goal: Free from accidental physical injury  3/5/2020 1104 by Cindy Arroyo LPN  Outcome: Ongoing  3/5/2020 0628 by Julien Edmonds RN  Outcome: Ongoing     Problem: DAILY CARE  Goal: Daily care needs are met  3/5/2020 1104 by Cindy Arroyo LPN  Outcome: Ongoing  3/5/2020 0628 by Julien Edmonds RN  Outcome: Ongoing     Problem: SKIN INTEGRITY  Goal: Skin integrity is maintained or improved  3/5/2020 1104 by Cindy Arroyo LPN  Outcome: Ongoing  3/5/2020 0628 by Julien Edmonds RN  Outcome: Ongoing     Problem: KNOWLEDGE DEFICIT  Goal: Patient/S.O. demonstrates understanding of disease process, treatment plan, medications, and discharge instructions.   3/5/2020 1104 by Cindy Arroyo LPN  Outcome: Ongoing  3/5/2020 0628 by Julien Edmonds RN  Outcome: Ongoing     Problem: DISCHARGE BARRIERS  Goal: Patient's continuum of care needs are met  3/5/2020 1104 by Cindy Arroyo LPN  Outcome: Ongoing  3/5/2020 0628 by Julien Edmonds RN  Outcome: Ongoing     Problem: Infection:  Goal: Will remain free from infection  Description  Will remain free from infection  3/5/2020 1104 by Cindy Arroyo LPN  Outcome: Ongoing  3/5/2020 0628 by Julien Edmonds RN  Outcome: Ongoing     Problem: Fluid Volume - Imbalance:  Goal: Absence of imbalanced fluid volume signs and symptoms  Description  Absence of imbalanced fluid volume signs and symptoms  3/5/2020 1104 by Cindy Arroyo LPN  Outcome: Ongoing  3/5/2020 0628 by Julien Edmonds RN  Outcome: Ongoing     Problem: Nutrition  Goal: Optimal nutrition therapy  3/5/2020 1104 by Cindy Arroyo LPN  Outcome: Ongoing  3/5/2020 0628 by Julien Edmonds RN  Outcome: Ongoing

## 2020-03-05 NOTE — PROGRESS NOTES
5.0 4.1 3.9   CL 92* 101 102   CO2 27 27 27   BUN 33* 32* 23   CREATININE 1.5* 1.3 1.3   GLUCOSE 560* 112* 58*     Hepatic:   Recent Labs     03/03/20  1340 03/04/20  0352 03/05/20  0506   AST 8* 10* 16   ALT 12 10 11   BILITOT 0.4 0.2 0.3   ALKPHOS 120 96 96     INR: No results for input(s): INR in the last 72 hours. RADIOLOGY REPORTS  CT scan of the chest:  Innumerable new pulmonary nodule and masses throughout the right upper lobe   with confluent mass in the anterior aspect of the right upper lobe as well as   multiple right apical spiculated pulmonary masses and nodules consistent with   new metastatic disease.  Interlobular septal thickening and ground-glass also   in the right upper lobe may represent lymphangitic spread of tumor and/or   postobstructive changes.       Extensive new right paratracheal, precarinal, right hilar and subcarinal   bulky lymphadenopathy consistent with new metastatic disease.       Post treatment changes of the left lung are stable.  Stable left lower lobe   bronchiectasis and mucus plugging.             CT scan of the head:  1. No acute intracranial abnormality. 2. New encephalomalacia in the right frontal lobe related to interim   infarction.  Additional chronic changes as above.          ASSESSMENT & RECOMMENDATIONS:     H/O DARLENE lung cancer: S/P LULobectomy followed by chemotherapy followed by consolidative XRT. Now with right  pulm nodules and rt lad. Recommend biopsy for further diagnosis and  treatment planning. PET as OP.  Recommend molecular studies if recurring NSCLC     Mild RI: adequate hydration     Improve nutritional status     Continue other medical care     ECOG Performance Status (ECOG Scale 0-4): 2-3     This plan was discussed with the patient and his daughter and they verbalized understanding.      We will continue to follow the patient.     Thank you for allowing us to participate in the care of this patient.   Jocelyn Powers MD, 3/5/2020, 12:47 PM

## 2020-03-05 NOTE — PROGRESS NOTES
Occupational [address] [place] [address] CARE OCCUPATIONAL THERAPY EVALUATION  Kandy Dolan, 1935, 3006/3006-A, 3/5/2020    History  [place]:  There were no encounter diagnoses. Patient  has a past medical history of CAD (coronary artery disease), Carcinoma of left lung ([address] Diabetes mellitus ([address] Heart murmur, systolic, [place] (hard of hearing), Hyperlipidemia, Hypertension, Left Lung Cancer, Lung mass, Teeth missing, Thyroid disease, and Wears glasses. Patient  has a past surgical history that includes Cardiac catheterization; other surgical history (3-4-16); other surgical history (12-31-15 ); Dental surgery; Tonsillectomy (1960's); and Lung surgery (Left, 03/16/2016). Subjective:  Patient states:  \"I've got to go to the bathroom\". Pain:  No.    Communication with other providers:  Handoff to RN, co-eval with PT. Restrictions: General Precautions, Fall Risk    Home Setup/Prior level of function  Social/Functional History  Lives With: Alone  Type of Home: House  Bathroom Shower/Tub: Tub/Shower unit  Bathroom Toilet: Standard  Bathroom Accessibility: Accessible  Home Equipment: [email or web address]  ADL Assistance: Independent  Homemaking Assistance: Needs assistance(daughter does laundry)  Ambulation Assistance: Independent  Transfer Assistance: Independent  Occupation: Retired    Examination of body systems (includes body structures/functions, activity/participation limitations):  · Observation:  Supine in bed upon arrival, agreeable to therapy  · [facility] [place]  · [place]  [facility] [place]  · Cardiopulmonary:  No 02 needs      Body Systems and functions:  · ROM R/L:  WFL.     · Strength R/L:  4_/5,   · Sensation: WFL  · Tone: Normal  · Coordination: WFL  · Perception: WNL    Activities of Daily Living (ADLs):  · Feeding: Gus  · Grooming: CGA (in stand at sink to wash hands after toileting)  · UB bathing: Supervision  · LB bathing: CGA (washing young area in stand after toileting)  · UB dressing: Supervision  · LB dressing: CGA (donning shoes sitting EOB)  · Toileting: CGA (in stand to urinate and washing young area in stand afterwards)    Cognitive and Psychosocial Functioning:  · Overall cognitive status: WFL  · Affect: Normal        Mobility:  · Supine to sit: Independent  · Transfers: CGA  · Sitting balance:  Independent. · Standing balance:  CGA. · Functional Mobility: Quique w/o RW, SBA w/ RW (See PT notes for gait details)  · Toilet/Shower Transfers: DNT             AM-PAC Daily Activity Inpatient   How much help for putting on and taking off regular lower body clothing?: A Little  How much help for Bathing?: A Little  How much help for Toileting?: A Little  How much help for putting on and taking off regular upper body clothing?: None  How much help for taking care of personal grooming?: A Little  How much help for eating meals?: None  AM-[facility] Inpatient Daily Activity Raw Score: 20  AM-PAC Inpatient ADL T-Scale Score : 42.03  ADL Inpatient CMS 0-100% Score: 38.32  ADL Inpatient CMS G-Code Modifier : CJ    Treatment:  Self Care Training:   Cues were given for safety, sequence, UE/LE placement, visual cues, and balance. Activities performed today included toileting, LB bathing, grooming, LB dressing    Safety: patient left in chair with chair alarm, call light within reach, RN notified, gait belt used. Assessment:  Pt is a 79 yo male admitted from home for pneumonia of right middle lobe. Pt at baseline is Independent for ADLs needs assistance for high level IADLs and is Independent for functional transfers/mobility . Pt currently presents w/ deficits in ADL and high level IADL independence, functional activity tolerance, dynamic sitting and standing balance and tolerance and functional transfers. Pt would benefit from continued acute care OT services w/ discharge to home w/ home health OT S1 and 24/7 supervision/assist  Complexity: Low  Prognosis: Good, no significant barriers to participation at this time. Plan  Times per week: 1x+  Times per day: Daily  Current Treatment Recommendations: Strengthening, Pain Management, Safety Education & Training, Balance Training, Patient/Caregiver Education & Training, Self-Care / ADL, Equipment Evaluation, Education, & procurement, Functional Mobility Training, Endurance Training, Home Management Training     Equipment: defer    Goals:  Pt goal: go home  Time Frame for STGs: discharge  Goal 1: Pt will perform UE ADLs Independent  Goal 2: Pt will perform LE ADLs Independent  Goal 3: Pt will perform toileting Independent  Goal 4: Pt will perform functional transfer w/ AD Gus  Goal 5: Pt will perform functional mobility w/ AD Gus  Goal 6: Pt will perform therex/theract in order to increase functional activity tolerance and dynamic standing balance    Treatment plan:  Pt will perform functional task in stand reaching in all 3 planes in order to increase dynamic standing balance and functional activity tolerance    Recommendations for NURSING activity: Up to chair for all 3 meals and up to standard commode for all toileting needs    Time:   Time in: 1420  Time out: 1440  Timed treatment minutes: 10 minutes  Total time: 20 minutes    Electronically signed by:    Ly BUCHANAN/L [id number]  3:45 PM,3/5/2020

## 2020-03-06 VITALS
DIASTOLIC BLOOD PRESSURE: 73 MMHG | HEART RATE: 97 BPM | OXYGEN SATURATION: 94 % | HEIGHT: 68 IN | TEMPERATURE: 98 F | SYSTOLIC BLOOD PRESSURE: 111 MMHG | WEIGHT: 123.4 LBS | RESPIRATION RATE: 20 BRPM | BODY MASS INDEX: 18.7 KG/M2

## 2020-03-06 LAB
GLUCOSE BLD-MCNC: 166 MG/DL (ref 70–99)
GLUCOSE BLD-MCNC: 49 MG/DL (ref 70–99)
GLUCOSE BLD-MCNC: 51 MG/DL (ref 70–99)

## 2020-03-06 PROCEDURE — 94150 VITAL CAPACITY TEST: CPT

## 2020-03-06 PROCEDURE — 82962 GLUCOSE BLOOD TEST: CPT

## 2020-03-06 PROCEDURE — 94669 MECHANICAL CHEST WALL OSCILL: CPT

## 2020-03-06 PROCEDURE — 99239 HOSP IP/OBS DSCHRG MGMT >30: CPT | Performed by: INTERNAL MEDICINE

## 2020-03-06 PROCEDURE — 6370000000 HC RX 637 (ALT 250 FOR IP): Performed by: INTERNAL MEDICINE

## 2020-03-06 PROCEDURE — 94668 MNPJ CHEST WALL SBSQ: CPT

## 2020-03-06 PROCEDURE — 94640 AIRWAY INHALATION TREATMENT: CPT

## 2020-03-06 PROCEDURE — 99231 SBSQ HOSP IP/OBS SF/LOW 25: CPT | Performed by: NURSE PRACTITIONER

## 2020-03-06 PROCEDURE — 94761 N-INVAS EAR/PLS OXIMETRY MLT: CPT

## 2020-03-06 RX ORDER — GUAIFENESIN 600 MG/1
600 TABLET, EXTENDED RELEASE ORAL 2 TIMES DAILY
Qty: 50 TABLET | Refills: 1 | Status: SHIPPED | OUTPATIENT
Start: 2020-03-06

## 2020-03-06 RX ORDER — CEFUROXIME AXETIL 250 MG/1
250 TABLET ORAL 2 TIMES DAILY
Qty: 14 TABLET | Refills: 0 | Status: SHIPPED | OUTPATIENT
Start: 2020-03-06 | End: 2020-03-13 | Stop reason: ALTCHOICE

## 2020-03-06 RX ORDER — GLUCOSAMINE HCL/CHONDROITIN SU 500-400 MG
CAPSULE ORAL
Qty: 50 STRIP | Refills: 5 | Status: SHIPPED | OUTPATIENT
Start: 2020-03-06 | End: 2020-03-11 | Stop reason: SDUPTHER

## 2020-03-06 RX ADMIN — PANTOPRAZOLE SODIUM 40 MG: 40 TABLET, DELAYED RELEASE ORAL at 06:43

## 2020-03-06 RX ADMIN — CETIRIZINE HYDROCHLORIDE 10 MG: 10 TABLET, FILM COATED ORAL at 10:16

## 2020-03-06 RX ADMIN — IPRATROPIUM BROMIDE AND ALBUTEROL SULFATE 1 AMPULE: .5; 3 SOLUTION RESPIRATORY (INHALATION) at 06:55

## 2020-03-06 RX ADMIN — GUAIFENESIN 600 MG: 600 TABLET, EXTENDED RELEASE ORAL at 10:17

## 2020-03-06 RX ADMIN — DEXTROSE 15 G: 15 GEL ORAL at 07:07

## 2020-03-06 RX ADMIN — BUDESONIDE AND FORMOTEROL FUMARATE DIHYDRATE 2 PUFF: 160; 4.5 AEROSOL RESPIRATORY (INHALATION) at 06:55

## 2020-03-06 RX ADMIN — HYDROCHLOROTHIAZIDE 12.5 MG: 12.5 TABLET ORAL at 10:16

## 2020-03-06 RX ADMIN — AMLODIPINE BESYLATE 5 MG: 5 TABLET ORAL at 10:17

## 2020-03-06 NOTE — DISCHARGE INSTR - COC
Continuity of Care Form    Patient Name: Beryle Greener   :  1935  MRN:  [id number]    Admit date:  3/3/2020  Discharge date:  ***    Code Status Order: Full Code   Advance Directives:   Advance Care Flowsheet Documentation     Date/Time Healthcare Directive Type of Healthcare Directive Copy in [address] Agent's Name Healthcare Agent's Phone Number    [phone]  Yes, patient has an advance directive for healthcare treatment  Durable power of  for health care  No, copy requested from family  Healthcare power of   [place]  --          Admitting Physician:  Saloni Villegas MD  PCP: Beltran Stern MD    Discharging Nurse: [facility] Unit/Room#: 3006/3006-A  Discharging Unit Phone Number: ***    Emergency Contact:   Extended Emergency Contact Information  Primary Emergency Contact: Christine Tucker  Address: [address] [place] [address], [address] [place] [address] [address]  Phone: [phone]  Mobile Phone: [phone]  Relation: Child    Past Surgical History:  Past Surgical History:   Procedure Laterality Date    CARDIAC CATHETERIZATION      per old chart pt had cardiac cath and right femoral artery angiogram done 2008   [facility] DENTAL SURGERY      Teeth Extracted In Past    LUNG SURGERY Left 2016    Robotic assisted thorocotomy, DARLENE, node sampling and right supraclavicular lymph node biopsy    OTHER SURGICAL HISTORY  3-4-16    CT Guided Biopsy Left Thyroid Mass    OTHER SURGICAL HISTORY  12-31-15     CT Guided Lung Biopsy Done 12-31-15    TONSILLECTOMY         Immunization History:   Immunization History   Administered Date(s) Administered    Influenza Virus Vaccine 2019    Pneumococcal Conjugate 13-valent (Brandin Susie) 2018       Active Problems:  Patient Active Problem List   Diagnosis Code    Essential hypertension I10    Hypercholesterolemia E78.00    Controlled type 2 diabetes mellitus with stage 3 chronic kidney disease, with long-term current use of insulin (HCC) E11.22, N18.3, Z79.4    Pulmonary emphysema (HCC) J43.9    Nonrheumatic aortic valve stenosis I35.0    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) 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    Pneumonia of right middle lobe due to infectious organism ([address] J18.1    Hypoxia R09.02    Severe malnutrition (HCC) E43       Isolation/Infection:   Isolation          No Isolation        Patient Infection Status     None to display          Nurse Assessment:  Last Vital Signs: BP (!) 151/90   Pulse 97   Temp 98.2 °F (36.8 °C) (Oral)   Resp 20   Ht 5' 8\" (1.727 m)   Wt 123 lb 6.4 oz (56 kg)   SpO2 94%   BMI 18.76 kg/m²     Last documented pain score (0-10 scale): Pain Level: 0  Last Weight:   Wt Readings from Last 1 Encounters:   20 123 lb 6.4 oz (56 kg)     Mental Status:  {IP PT MENTAL STATUS:}    IV Access:  { QUYNH IV ACCESS:978608655}    Nursing Mobility/ADLs:  Walking   {P DME YGSR:544311465}  Transfer  {[facility] DME ZAGT:199946792}  Bathing  {[facility] DME IKYJ:092773476}  Dressing  {P DME BEXD:800164535}  Toileting  {P DME ZMSE:167274717}  Feeding  {[facility] DME WIYE:855814801}  Med Admin  {[facility] DME DSA}  Med Delivery   {[facility] MED Delivery:806817454}    Wound Care Documentation and Therapy:  Incision 16 Chest Left (Active)   Number of days: 1450       Incision 16 Neck Right (Active)   Number of days: 1450        Elimination:  Continence:   · Bowel: {YES / GL:54251}  · Bladder: {YES / TN:43584}  Urinary Catheter: {Urinary Catheter:724998889}   Colostomy/Ileostomy/Ileal Conduit: {YES / IU:33399}       Date of Last BM: ***    Intake/Output Summary (Last 24 hours) at 3/6/2020 0951  Last data filed at 3/6/2020 0214  Gross per 24 hour   Intake --   Output 300 ml   Net -300 ml     I/O last 3 completed shifts:  In: -   Out: 300 [Urine:300]    Safety Concerns:     Preeti8 Clarisa BEAUCHAMP Safety Admission H&P: {CHP DME Changes in KBHGX:736249217}    PHYSICIAN SIGNATURE:  {Esignature:186342358}

## 2020-03-06 NOTE — CARE COORDINATION
Spoke with patient daughter/Rossy who states they have already received walker. Fallon with [facility] is aware of discharge.

## 2020-03-06 NOTE — PROGRESS NOTES
ONCOLOGY HEMATOLOGY CARE ([facility])  PROGRESS NOTE      3/6/2020  9:18 AM    Patient:    Ely Rod  : 1935   84 y. o. MRN: [id number]  Admitted: 3/3/2020  5:06 PM ATT: Fernanda Celestine, *   4100/2313-O  AdmitDx: Hypoxia [R09.02]  PCP: Lon Aponte MD      Patient was seen and examined today. Not in any acute distress and no overnight events. Reports cough has diminished, he denies shortness of breath, no hemoptysis, small nose bleed last night. Likely discharged today. REVIEW OF SYSTEMS      Constitutional:  Denies fever, chills, loss of appetite  HEENT:  Denies swelling of neck glands  Respiratory:  Denies  Hemoptysis +epistaxisis   Cardiovascular:  Denies  palpitations or swelling   GI:  Denies abdominal pain, nausea, vomiting, constipation or diarrhea   Musculoskeletal:  Denies back pain   Skin:  Denies rash   Neurologic:  Denies headache, focal weakness or sensory changes   PSYCHIATRIC:  Neg depression, personality changes, anxiety.   ALL/IMM:  Neg reactions to drugs other than listed    All systems negative except  for symptoms of HPI and as marked as above    PHYSICAL EXAM :    Vitals: BP (!) 151/90   Pulse 97   Temp 98.2 °F (36.8 °C) (Oral)   Resp 20   Ht 5' 8\" (1.727 m)   Wt 123 lb 6.4 oz (56 kg)   SpO2 94%   BMI 18.76 kg/m²     CONSTITUTIONAL: tired appearing  EYES: bruce  ENT: atnc  NECK: no jvd  HEMATOLOGIC/LYMPHATIC: no cervical, supraclavicular or axillary lymphadenopathy   LUNGS: faint wheezing  CARDIOVASCULAR:s1s2 SM+  ABDOMEN: soft ntnd bs pos  NEUROLOGIC: GI  EXTREMITIES: no LE edema, no cyanosis, no leg swelling, no clubbing    LABORATORY RESULTS  CBC:   Recent Labs     20  1340 20  0352 20  0506   WBC 12.1* 9.2 8.6   HGB 15.1 14.2 13.5    374 368     BMP:    Recent Labs     20  1340 20  0352 20  0506   * 139 138   K 5.0 4.1 3.9   CL 92* 101 102   CO2 27 27 27   BUN 33* 32* 23   CREATININE 1.5* 1. 3 1.3   GLUCOSE 560* 112* 58*     Hepatic:   Recent Labs     03/03/20  1340 03/04/20  0352 03/05/20  0506   AST 8* 10* 16   ALT 12 10 11   BILITOT 0.4 0.2 0.3   ALKPHOS 120 96 96     INR:   Recent Labs     03/05/20  0506   INR 1.06         RADIOLOGY REPORTS  CT scan of the chest:  Innumerable new pulmonary nodule and masses throughout the right upper lobe   with confluent mass in the anterior aspect of the right upper lobe as well as   multiple right apical spiculated pulmonary masses and nodules consistent with   new metastatic disease.  Interlobular septal thickening and ground-glass also   in the right upper lobe may represent lymphangitic spread of tumor and/or   postobstructive changes.       Extensive new right paratracheal, precarinal, right hilar and subcarinal   bulky lymphadenopathy consistent with new metastatic disease.       Post treatment changes of the left lung are stable.  Stable left lower lobe   bronchiectasis and mucus plugging.             CT scan of the head:  1. No acute intracranial abnormality. 2. New encephalomalacia in the right frontal lobe related to interim   infarction.  Additional chronic changes as above.          ASSESSMENT & RECOMMENDATIONS:     H/O DARLENE lung cancer: S/P LULobectomy followed by chemotherapy followed by consolidative XRT. Now with right  pulm nodules and rt lad. Will have biopsy as outpatient  for further diagnosis and  treatment planning. PET as OP. Recommend molecular studies if recurring NSCLC    He is scheduled for biopsy 3/13, and will see Dr. Elayne Arzola in office around 3/19.     Mild RI: adequate hydration     Improve nutritional status     Continue other medical care     ECOG Performance Status (ECOG Scale 0-4): 2-3     This plan was discussed with the patient and his daughter and they verbalized understanding.      We will continue to follow the patient.     Thank you for allowing us to participate in the care of this patient.        PAUL oL -

## 2020-03-06 NOTE — CARE COORDINATION
Kandy Dolan was evaluated today and a DME order was entered for a wheeled walker because he requires this to successfully complete daily living tasks of eating, bathing, toileting, personal cares, ambulating, grooming, hygiene, dressing upper body, dressing lower body, meal preparation and taking own medications. A wheeled walker is necessary due to the patient's unsteady gait, upper body weakness, and inability to  an ambulation device; and he can ambulate only by pushing a walker instead of a lesser assistive device such as a cane, crutch, or standard walker. The need for this equipment was discussed with the patient and he understands and is in agreement.

## 2020-03-06 NOTE — DISCHARGE SUMMARY
250 MG tablet  Take 1 tablet by mouth 2 times daily for 7 days             cetirizine (ZYRTEC) 10 MG tablet  Take 1 tablet by mouth daily             fluticasone (FLONASE) 50 MCG/ACT nasal spray  2 sprays by Each Nostril route daily             guaiFENesin (MUCINEX) 600 MG extended release tablet  Take 1 tablet by mouth 2 times daily             hydrochlorothiazide (MICROZIDE) 12.5 MG capsule  Take 1 capsule by mouth daily             insulin lispro protamine & lispro (HUMALOG MIX 75/25) (75-25) 100 UNIT per ML SUSP injection vial  Use 22 units in the morning with breakfast and 18 units in the evening with dinner.              lidocaine viscous hcl (XYLOCAINE) 2 % SOLN solution  Take 5 mLs by mouth as needed for Irritation             mirtazapine (REMERON) 15 MG tablet  Take 1 tablet by mouth nightly             omeprazole (PRILOSEC) 20 MG delayed release capsule  Take 1 capsule by mouth daily             quinapril (ACCUPRIL) 40 MG tablet  Take 1 tablet by mouth daily TAKE ONE TABLET BY MOUTH EVERY MORNING             spironolactone (ALDACTONE) 25 MG tablet  Take 1 tablet by mouth daily             tiotropium (SPIRIVA RESPIMAT) 2.5 MCG/ACT AERS inhaler  Inhale 2 puffs into the lungs daily             UNABLE TO FIND  Please administer two SHINGRIX vaccines 2-6 months apart                Activity: as tolerated  Diet: CCD    Time Spent on discharge is more than 30 minutes discussing plan of care and discharge medications with patient and nursing staff    Signed:  Wily Paulson   3/6/2020

## 2020-03-06 NOTE — PROGRESS NOTES
Appt is already scheduled for March 11th. Unable to reach patient or his daughter for transitional questions. Will try back again.

## 2020-03-07 ENCOUNTER — CARE COORDINATION (OUTPATIENT)
Dept: CASE MANAGEMENT | Age: 85
End: 2020-03-07

## 2020-03-09 ENCOUNTER — TELEPHONE (OUTPATIENT)
Dept: INTERNAL MEDICINE CLINIC | Age: 85
End: 2020-03-09

## 2020-03-10 ENCOUNTER — CARE COORDINATION (OUTPATIENT)
Dept: CASE MANAGEMENT | Age: 85
End: 2020-03-10

## 2020-03-10 PROCEDURE — 1111F DSCHRG MED/CURRENT MED MERGE: CPT | Performed by: INTERNAL MEDICINE

## 2020-03-10 NOTE — CARE COORDINATION
[facility] Transitions Follow Up Call    3/10/2020    Patient: Kandy Dolan  Patient : 1935   MRN: [id number]  Reason for Admission:   SOB  Discharge Date: 3/6/20 RARS: Readmission Risk Score: 12         Spoke with:   Patient spouse    Care Transitions Subsequent and Final Call    Schedule Follow Up Appointment with PCP:  Declined  Subsequent and Final Calls  Have your medications changed?:  No  Do you have any questions related to your medications?:  No  Do you currently have any active services?:  Yes  Are you currently active with any services?:  Home Health  Do you have any needs or concerns that I can assist you with?:  No  Identified Barriers:  None  Care Transitions Interventions  Other Interventions: Follow Up:  Spoke with patient's spouse. Reports that patient is doing \"a little better\". Reports that patient is SOB with exertion as per his baseline. Denies increased cough, mucous, fever, chills, CP or SOB at rest.  Reports that patient's appetite and energy level has improved. Reviewed PNA zone management tool and s/s to report to MD.    Patient spouse reports that she is monitoring patient's blood sugar routinely. Reports that she is \"trying\" to support patient compliance with diabetic diet. Reviewed Diabetes zone management tool and s/s to report to MD.    Reviewed discharge instructions and medications. Medication reconciliation completed. Patient spouse reports that patient is taking his medications as directed. Encouraged her to bring updated list/ medication bottles to Provider follow up appointment. Patient is active with [address] [place] [address] Rd. Reminded patient spouse of the benefits of [facility] as a 24/7 resource for any change in patient condition or worsening symptoms. Patient spouse requests CTN follow up with [address] [place] [address] Rd to confirm [facility] RN/ PT follow up visit and reminder of PCP appointment tomorrow. Informed of plan for follow through.     Patient spouse denies any questions,

## 2020-03-11 ENCOUNTER — OFFICE VISIT (OUTPATIENT)
Dept: INTERNAL MEDICINE CLINIC | Age: 85
End: 2020-03-11
Payer: MEDICARE

## 2020-03-11 VITALS
BODY MASS INDEX: 18.85 KG/M2 | OXYGEN SATURATION: 93 % | HEART RATE: 116 BPM | SYSTOLIC BLOOD PRESSURE: 134 MMHG | WEIGHT: 124 LBS | RESPIRATION RATE: 18 BRPM | DIASTOLIC BLOOD PRESSURE: 72 MMHG

## 2020-03-11 LAB
BILIRUBIN URINE: NEGATIVE
BLOOD, URINE: NEGATIVE
CLARITY: ABNORMAL
COLOR: YELLOW
CRYSTALS, UA: ABNORMAL /HPF
EPITHELIAL CELLS, UA: 1 /HPF (ref 0–5)
GLUCOSE URINE: 100 MG/DL
HYALINE CASTS: 2 /LPF (ref 0–8)
KETONES, URINE: NEGATIVE MG/DL
LEUKOCYTE ESTERASE, URINE: ABNORMAL
MICROSCOPIC EXAMINATION: YES
NITRITE, URINE: NEGATIVE
PH UA: 5.5 (ref 5–8)
PROTEIN UA: 100 MG/DL
SPECIFIC GRAVITY UA: 1.02 (ref 1–1.03)
URINE TYPE: ABNORMAL
UROBILINOGEN, URINE: 0.2 E.U./DL
WBC UA: 18 /HPF (ref 0–5)

## 2020-03-11 PROCEDURE — 81003 URINALYSIS AUTO W/O SCOPE: CPT | Performed by: INTERNAL MEDICINE

## 2020-03-11 PROCEDURE — 1111F DSCHRG MED/CURRENT MED MERGE: CPT | Performed by: INTERNAL MEDICINE

## 2020-03-11 PROCEDURE — 99496 TRANSJ CARE MGMT HIGH F2F 7D: CPT | Performed by: INTERNAL MEDICINE

## 2020-03-11 RX ORDER — GLUCOSAMINE HCL/CHONDROITIN SU 500-400 MG
CAPSULE ORAL
Qty: 100 STRIP | Refills: 5 | Status: SHIPPED | OUTPATIENT
Start: 2020-03-11 | End: 2020-03-13

## 2020-03-11 NOTE — PROGRESS NOTES
On the basis of positive falls risk screening, assessment and plan is as follows: referral to physical therapy provided for strength and balance training. Post-Discharge Transitional Care Management Services or Hospital Follow Up      Isha Carpenter   YOB: 1935    Date of Office Visit:  3/11/2020  Date of Hospital Admission: 3/3/20  Date of Hospital Discharge: 3/6/20  Risk of hospital readmission (high >=14%. Medium >=10%) :Readmission Risk Score: 16      Care management risk score Rising risk (score 2-5) and Complex Care (Scores >=6): 3     Non face to face  following discharge, date last encounter closed (first attempt may have been earlier): 3/7/2020  9:42 AM    Call initiated 2 business days of discharge: Yes    Patient Active Problem List   Diagnosis    Essential hypertension    Hypercholesterolemia    Controlled type 2 diabetes mellitus with stage 3 chronic kidney disease, with long-term current use of insulin ([address]    Pulmonary emphysema ([address]    Nonrheumatic aortic valve stenosis    Squamous cell carcinoma of left lung ([address]    Pneumonia of right middle lobe due to infectious organism ([address]    Hypoxia    Severe malnutrition ([address]       Allergies   Allergen Reactions    Aspirin      \"Made Me Have Bleeding In My Stool\"       Medications listed as ordered at the time of discharge from [facility]   Home Medication Instructions SARAH BETH:    Printed on:03/11/20 1241   Medication Information                      albuterol sulfate HFA (PROAIR HFA) 108 (90 Base) MCG/ACT inhaler  Inhale 2 puffs into the lungs every 6 hours as needed for Wheezing             amLODIPine (NORVASC) 5 MG tablet  TAKE ONE TABLET BY MOUTH EVERY MORNING             blood glucose monitor kit and supplies  Test 1 times a day & as needed for symptoms of irregular blood glucose. blood glucose monitor strips  Test 3 times a day & as needed for symptoms of irregular blood glucose. blood glucose test strips (ACCU-CHEK FRANKY PLUS) strip  USE ONE STRIP TO TEST TWICE A DAY             budesonide-formoterol (SYMBICORT) 160-4.5 MCG/ACT AERO  Inhale 2 puffs into the lungs 2 times daily             cefUROXime (CEFTIN) 250 MG tablet  Take 1 tablet by mouth 2 times daily for 7 days             cetirizine (ZYRTEC) 10 MG tablet  Take 1 tablet by mouth daily             fluticasone (FLONASE) 50 MCG/ACT nasal spray  2 sprays by Each Nostril route daily             guaiFENesin (MUCINEX) 600 MG extended release tablet  Take 1 tablet by mouth 2 times daily             hydrochlorothiazide (MICROZIDE) 12.5 MG capsule  Take 1 capsule by mouth daily             insulin lispro protamine & lispro (HUMALOG MIX 75/25) (75-25) 100 UNIT per ML SUSP injection vial  Use 30 units twice daily             mirtazapine (REMERON) 15 MG tablet  Take 1 tablet by mouth nightly             omeprazole (PRILOSEC) 20 MG delayed release capsule  Take 1 capsule by mouth daily             quinapril (ACCUPRIL) 40 MG tablet  Take 1 tablet by mouth daily TAKE ONE TABLET BY MOUTH EVERY MORNING             spironolactone (ALDACTONE) 25 MG tablet  Take 1 tablet by mouth daily             tiotropium (SPIRIVA RESPIMAT) 2.5 MCG/ACT AERS inhaler  Inhale 2 puffs into the lungs daily             traMADol (ULTRAM) 50 MG tablet  Take 50 mg by mouth every 6 hours as needed for Pain. UNABLE TO FIND  Please administer two SHINGRIX vaccines 2-6 months apart                   Medications marked \"taking\" at this time  Outpatient Medications Marked as Taking for the 3/11/20 encounter (Office Visit) with Graeme Burkitt, MD   Medication Sig Dispense Refill    blood glucose monitor strips Test 3 times a day & as needed for symptoms of irregular blood glucose.  100 strip 5    insulin lispro protamine & lispro (HUMALOG MIX 75/25) (75-25) 100 UNIT per ML SUSP injection vial Use 30 units twice daily 2 vial 5    guaiFENesin (MUCINEX) 600 MG extended release tablet Take 1 tablet by mouth 2 times daily 50 tablet 1    cefUROXime (CEFTIN) 250 MG tablet Take 1 tablet by mouth 2 times daily for 7 days 14 tablet 0    blood glucose monitor kit and supplies Test 1 times a day & as needed for symptoms of irregular blood glucose.  1 kit 0    mirtazapine (REMERON) 15 MG tablet Take 1 tablet by mouth nightly 30 tablet 3    tiotropium (SPIRIVA RESPIMAT) 2.5 MCG/ACT AERS inhaler Inhale 2 puffs into the lungs daily 1 Inhaler 5    cetirizine (ZYRTEC) 10 MG tablet Take 1 tablet by mouth daily 30 tablet 5    fluticasone (FLONASE) 50 MCG/ACT nasal spray 2 sprays by Each Nostril route daily 1 Bottle 5    hydrochlorothiazide (MICROZIDE) 12.5 MG capsule Take 1 capsule by mouth daily 30 capsule 5    amLODIPine (NORVASC) 5 MG tablet TAKE ONE TABLET BY MOUTH EVERY MORNING 30 tablet 5    omeprazole (PRILOSEC) 20 MG delayed release capsule Take 1 capsule by mouth daily 30 capsule 5    spironolactone (ALDACTONE) 25 MG tablet Take 1 tablet by mouth daily 30 tablet 5    budesonide-formoterol (SYMBICORT) 160-4.5 MCG/ACT AERO Inhale 2 puffs into the lungs 2 times daily 1 Inhaler 3    albuterol sulfate HFA (PROAIR HFA) 108 (90 Base) MCG/ACT inhaler Inhale 2 puffs into the lungs every 6 hours as needed for Wheezing 1 Inhaler 5    quinapril (ACCUPRIL) 40 MG tablet Take 1 tablet by mouth daily TAKE ONE TABLET BY MOUTH EVERY MORNING 90 tablet 3    blood glucose test strips (ACCU-CHEK FRANKY PLUS) strip USE ONE STRIP TO TEST TWICE A DAY 60 strip 11    UNABLE TO FIND Please administer two SHINGRIX vaccines 2-6 months apart 2 Units 0        Medications patient taking as of now reconciled against medications ordered at time of hospital discharge: Yes    Chief Complaint   Patient presents with    Follow-Up from Hospital    Medication Refill       History of Present illness - Follow up of Hospital diagnosis(es):   Squamous cell ca, pneumonia, DM 2    Inpatient course: Discharge or tenderness  Neurologic: reflexes normal and symmetric, no cranial nerve deficit, gait, coordination and speech normal    Assessment/Plan:  1. At high risk for falls - will start PT at home  - [address] MED LIST    2. Type 2 diabetes mellitus without complication, with long-term current use of insulin ([facility]) -- increase insulin to 30 units BID, contg to check sugars 3 time daily  - blood glucose monitor strips; Test 3 times a day & as needed for symptoms of irregular blood glucose. Dispense: 100 strip; Refill: 5  - insulin lispro protamine & lispro (HUMALOG MIX 75/25) (75-25) 100 UNIT per ML SUSP injection vial; Use 30 units twice daily  Dispense: 2 vial; Refill: 5  - RI DISCHARGE MEDS RECONCILED W/ CURRENT OUTPATIENT MED LIST    3. 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) - await biopsy, plan from Dr Gonzalo Key    4. Dysuria - check UA and culture  - URINALYSIS  - Culture, Urine  - RI DISCHARGE MEDS RECONCILED W/ CURRENT OUTPATIENT MED LIST    5. Lower extremity edema - not symmetric. Will check venous doppler to eval for DVT hitesh given the known cancer  - VL LOWER EXTREMITY BILATERAL VENOUS DUPLEX; Future  - RI DISCHARGE MEDS RECONCILED W/ CURRENT OUTPATIENT MED LIST      Pt needs nursing services because of medical conditions including lung cancer, COPD. The patient requires SN, PT/OT. Patient is homebound because of lung cancer, COPD. Pt needs the services to monitor patient, assist with ADLS, and PT/OT.    Medical Decision Making: high complexity

## 2020-03-12 ENCOUNTER — HOSPITAL ENCOUNTER (OUTPATIENT)
Dept: ULTRASOUND IMAGING | Age: 85
Discharge: HOME | DRG: 308 | End: 2020-03-12
Payer: MEDICARE

## 2020-03-12 LAB — URINE CULTURE, ROUTINE: NORMAL

## 2020-03-12 PROCEDURE — 93970 EXTREMITY STUDY: CPT

## 2020-03-12 RX ORDER — CIPROFLOXACIN 250 MG/1
250 TABLET, FILM COATED ORAL 2 TIMES DAILY
Qty: 20 TABLET | Refills: 0 | Status: SHIPPED | OUTPATIENT
Start: 2020-03-12 | End: 2020-03-22

## 2020-03-12 RX ORDER — SODIUM CHLORIDE 0.9 % (FLUSH) 0.9 %
10 SYRINGE (ML) INJECTION PRN
Status: CANCELLED | OUTPATIENT
Start: 2020-03-12

## 2020-03-13 ENCOUNTER — HOSPITAL ENCOUNTER (INPATIENT)
Age: 85
LOS: 2 days | Discharge: HOME HEALTH | DRG: 308 | End: 2020-03-15
Attending: EMERGENCY MEDICINE | Admitting: INTERNAL MEDICINE
Payer: MEDICARE

## 2020-03-13 ENCOUNTER — HOSPITAL ENCOUNTER (OUTPATIENT)
Dept: GENERAL RADIOLOGY | Age: 85
Discharge: HOME | DRG: 308 | End: 2020-03-13
Payer: MEDICARE

## 2020-03-13 ENCOUNTER — HOSPITAL ENCOUNTER (OUTPATIENT)
Dept: GENERAL RADIOLOGY | Age: 85
DRG: 308 | End: 2020-03-13
Payer: MEDICARE

## 2020-03-13 ENCOUNTER — HOSPITAL ENCOUNTER (OUTPATIENT)
Dept: CT IMAGING | Age: 85
Discharge: HOME | DRG: 308 | End: 2020-03-13
Payer: MEDICARE

## 2020-03-13 ENCOUNTER — TELEPHONE (OUTPATIENT)
Dept: INTERNAL MEDICINE CLINIC | Age: 85
End: 2020-03-13

## 2020-03-13 ENCOUNTER — APPOINTMENT (OUTPATIENT)
Dept: CT IMAGING | Age: 85
DRG: 308 | End: 2020-03-13
Payer: MEDICARE

## 2020-03-13 VITALS
HEIGHT: 68 IN | SYSTOLIC BLOOD PRESSURE: 131 MMHG | WEIGHT: 134 LBS | RESPIRATION RATE: 20 BRPM | OXYGEN SATURATION: 94 % | BODY MASS INDEX: 20.31 KG/M2 | HEART RATE: 109 BPM | DIASTOLIC BLOOD PRESSURE: 72 MMHG | TEMPERATURE: 97.8 F

## 2020-03-13 DIAGNOSIS — I48.91 ATRIAL FIBRILLATION WITH RVR (HCC): Primary | ICD-10-CM

## 2020-03-13 LAB
ALBUMIN SERPL-MCNC: 3.2 GM/DL (ref 3.4–5)
ALP BLD-CCNC: 116 IU/L (ref 40–129)
ALT SERPL-CCNC: 12 U/L (ref 10–40)
ANION GAP SERPL CALCULATED.3IONS-SCNC: 10 MMOL/L (ref 4–16)
APTT: 36.6 SECONDS (ref 25.1–37.1)
AST SERPL-CCNC: 16 IU/L (ref 15–37)
BILIRUB SERPL-MCNC: 0.2 MG/DL (ref 0–1)
BUN BLDV-MCNC: 17 MG/DL (ref 6–23)
CALCIUM SERPL-MCNC: 9.7 MG/DL (ref 8.3–10.6)
CHLORIDE BLD-SCNC: 101 MMOL/L (ref 99–110)
CO2: 27 MMOL/L (ref 21–32)
CREAT SERPL-MCNC: 1.3 MG/DL (ref 0.9–1.3)
EKG ATRIAL RATE: 141 BPM
EKG ATRIAL RATE: 144 BPM
EKG DIAGNOSIS: NORMAL
EKG DIAGNOSIS: NORMAL
EKG P AXIS: 46 DEGREES
EKG P-R INTERVAL: 128 MS
EKG P-R INTERVAL: 196 MS
EKG Q-T INTERVAL: 338 MS
EKG Q-T INTERVAL: 348 MS
EKG QRS DURATION: 136 MS
EKG QRS DURATION: 144 MS
EKG QTC CALCULATION (BAZETT): 497 MS
EKG QTC CALCULATION (BAZETT): 537 MS
EKG R AXIS: -4 DEGREES
EKG R AXIS: 18 DEGREES
EKG T AXIS: 151 DEGREES
EKG T AXIS: 160 DEGREES
EKG VENTRICULAR RATE: 130 BPM
EKG VENTRICULAR RATE: 143 BPM
GFR AFRICAN AMERICAN: >60 ML/MIN/1.73M2
GFR NON-AFRICAN AMERICAN: 53 ML/MIN/1.73M2
GLUCOSE BLD-MCNC: 158 MG/DL (ref 70–99)
GLUCOSE BLD-MCNC: 221 MG/DL (ref 70–99)
HCT VFR BLD CALC: 49.5 % (ref 42–52)
HEMOGLOBIN: 15.1 GM/DL (ref 13.5–18)
INR BLD: 1.06 INDEX
LV EF: 45 %
LVEF MODALITY: NORMAL
MAGNESIUM: 2 MG/DL (ref 1.8–2.4)
MCH RBC QN AUTO: 28.5 PG (ref 27–31)
MCHC RBC AUTO-ENTMCNC: 30.5 % (ref 32–36)
MCV RBC AUTO: 93.4 FL (ref 78–100)
PDW BLD-RTO: 15.9 % (ref 11.7–14.9)
PLATELET # BLD: 391 K/CU MM (ref 140–440)
PMV BLD AUTO: 9.8 FL (ref 7.5–11.1)
POTASSIUM SERPL-SCNC: 4 MMOL/L (ref 3.5–5.1)
PROTHROMBIN TIME: 12.8 SECONDS (ref 11.7–14.5)
RBC # BLD: 5.3 M/CU MM (ref 4.6–6.2)
SODIUM BLD-SCNC: 138 MMOL/L (ref 135–145)
T4 FREE: 1.15 NG/DL (ref 0.9–1.8)
TOTAL PROTEIN: 6.9 GM/DL (ref 6.4–8.2)
TROPONIN T: <0.01 NG/ML
TSH HIGH SENSITIVITY: 0.71 UIU/ML (ref 0.27–4.2)
WBC # BLD: 11.2 K/CU MM (ref 4–10.5)

## 2020-03-13 PROCEDURE — 96366 THER/PROPH/DIAG IV INF ADDON: CPT

## 2020-03-13 PROCEDURE — 6360000004 HC RX CONTRAST MEDICATION: Performed by: EMERGENCY MEDICINE

## 2020-03-13 PROCEDURE — 96374 THER/PROPH/DIAG INJ IV PUSH: CPT

## 2020-03-13 PROCEDURE — 2500000003 HC RX 250 WO HCPCS: Performed by: EMERGENCY MEDICINE

## 2020-03-13 PROCEDURE — 88341 IMHCHEM/IMCYTCHM EA ADD ANTB: CPT

## 2020-03-13 PROCEDURE — 2580000003 HC RX 258: Performed by: RADIOLOGY

## 2020-03-13 PROCEDURE — 71045 X-RAY EXAM CHEST 1 VIEW: CPT

## 2020-03-13 PROCEDURE — 84484 ASSAY OF TROPONIN QUANT: CPT

## 2020-03-13 PROCEDURE — 85610 PROTHROMBIN TIME: CPT

## 2020-03-13 PROCEDURE — 6370000000 HC RX 637 (ALT 250 FOR IP): Performed by: INTERNAL MEDICINE

## 2020-03-13 PROCEDURE — 85730 THROMBOPLASTIN TIME PARTIAL: CPT

## 2020-03-13 PROCEDURE — 0BBK3ZX EXCISION OF RIGHT LUNG, PERCUTANEOUS APPROACH, DIAGNOSTIC: ICD-10-PCS | Performed by: RADIOLOGY

## 2020-03-13 PROCEDURE — 82962 GLUCOSE BLOOD TEST: CPT

## 2020-03-13 PROCEDURE — 88342 IMHCHEM/IMCYTCHM 1ST ANTB: CPT

## 2020-03-13 PROCEDURE — 96365 THER/PROPH/DIAG IV INF INIT: CPT

## 2020-03-13 PROCEDURE — 93005 ELECTROCARDIOGRAM TRACING: CPT | Performed by: EMERGENCY MEDICINE

## 2020-03-13 PROCEDURE — 6360000002 HC RX W HCPCS: Performed by: RADIOLOGY

## 2020-03-13 PROCEDURE — 2580000003 HC RX 258: Performed by: EMERGENCY MEDICINE

## 2020-03-13 PROCEDURE — 2709999900 HC NON-CHARGEABLE SUPPLY

## 2020-03-13 PROCEDURE — 88305 TISSUE EXAM BY PATHOLOGIST: CPT

## 2020-03-13 PROCEDURE — 99285 EMERGENCY DEPT VISIT HI MDM: CPT

## 2020-03-13 PROCEDURE — 7100000011 HC PHASE II RECOVERY - ADDTL 15 MIN

## 2020-03-13 PROCEDURE — 2580000003 HC RX 258: Performed by: INTERNAL MEDICINE

## 2020-03-13 PROCEDURE — 6360000002 HC RX W HCPCS: Performed by: EMERGENCY MEDICINE

## 2020-03-13 PROCEDURE — 85027 COMPLETE CBC AUTOMATED: CPT

## 2020-03-13 PROCEDURE — 7100000010 HC PHASE II RECOVERY - FIRST 15 MIN

## 2020-03-13 PROCEDURE — 94640 AIRWAY INHALATION TREATMENT: CPT

## 2020-03-13 PROCEDURE — 83735 ASSAY OF MAGNESIUM: CPT

## 2020-03-13 PROCEDURE — 2709999900 CT NEEDLE BIOPSY LUNG PERCUTANEOUS

## 2020-03-13 PROCEDURE — 84443 ASSAY THYROID STIM HORMONE: CPT

## 2020-03-13 PROCEDURE — 80053 COMPREHEN METABOLIC PANEL: CPT

## 2020-03-13 PROCEDURE — 93306 TTE W/DOPPLER COMPLETE: CPT

## 2020-03-13 PROCEDURE — 96372 THER/PROPH/DIAG INJ SC/IM: CPT

## 2020-03-13 PROCEDURE — 2140000000 HC CCU INTERMEDIATE R&B

## 2020-03-13 PROCEDURE — 88333 PATH CONSLTJ SURG CYTO XM 1: CPT

## 2020-03-13 PROCEDURE — 93010 ELECTROCARDIOGRAM REPORT: CPT | Performed by: INTERNAL MEDICINE

## 2020-03-13 PROCEDURE — 36415 COLL VENOUS BLD VENIPUNCTURE: CPT

## 2020-03-13 PROCEDURE — 71260 CT THORAX DX C+: CPT

## 2020-03-13 PROCEDURE — 84439 ASSAY OF FREE THYROXINE: CPT

## 2020-03-13 RX ORDER — TRAMADOL HYDROCHLORIDE 50 MG/1
50 TABLET ORAL EVERY 6 HOURS PRN
Status: DISCONTINUED | OUTPATIENT
Start: 2020-03-13 | End: 2020-03-15 | Stop reason: HOSPADM

## 2020-03-13 RX ORDER — SODIUM CHLORIDE 0.9 % (FLUSH) 0.9 %
10 SYRINGE (ML) INJECTION PRN
Status: DISCONTINUED | OUTPATIENT
Start: 2020-03-13 | End: 2020-03-14 | Stop reason: HOSPADM

## 2020-03-13 RX ORDER — DEXTROSE MONOHYDRATE 50 MG/ML
100 INJECTION, SOLUTION INTRAVENOUS PRN
Status: DISCONTINUED | OUTPATIENT
Start: 2020-03-13 | End: 2020-03-15 | Stop reason: HOSPADM

## 2020-03-13 RX ORDER — ACETAMINOPHEN 325 MG/1
650 TABLET ORAL EVERY 6 HOURS PRN
Status: DISCONTINUED | OUTPATIENT
Start: 2020-03-13 | End: 2020-03-15 | Stop reason: HOSPADM

## 2020-03-13 RX ORDER — ONDANSETRON 2 MG/ML
4 INJECTION INTRAMUSCULAR; INTRAVENOUS EVERY 6 HOURS PRN
Status: DISCONTINUED | OUTPATIENT
Start: 2020-03-13 | End: 2020-03-15 | Stop reason: HOSPADM

## 2020-03-13 RX ORDER — BUDESONIDE AND FORMOTEROL FUMARATE DIHYDRATE 160; 4.5 UG/1; UG/1
2 AEROSOL RESPIRATORY (INHALATION) 2 TIMES DAILY
Status: DISCONTINUED | OUTPATIENT
Start: 2020-03-13 | End: 2020-03-15 | Stop reason: HOSPADM

## 2020-03-13 RX ORDER — POLYETHYLENE GLYCOL 3350 17 G/17G
17 POWDER, FOR SOLUTION ORAL DAILY PRN
Status: DISCONTINUED | OUTPATIENT
Start: 2020-03-13 | End: 2020-03-15 | Stop reason: HOSPADM

## 2020-03-13 RX ORDER — PROMETHAZINE HYDROCHLORIDE 25 MG/1
12.5 TABLET ORAL EVERY 6 HOURS PRN
Status: DISCONTINUED | OUTPATIENT
Start: 2020-03-13 | End: 2020-03-15 | Stop reason: HOSPADM

## 2020-03-13 RX ORDER — DEXTROSE MONOHYDRATE 25 G/50ML
12.5 INJECTION, SOLUTION INTRAVENOUS PRN
Status: DISCONTINUED | OUTPATIENT
Start: 2020-03-13 | End: 2020-03-15 | Stop reason: HOSPADM

## 2020-03-13 RX ORDER — SODIUM CHLORIDE 0.9 % (FLUSH) 0.9 %
10 SYRINGE (ML) INJECTION PRN
Status: DISCONTINUED | OUTPATIENT
Start: 2020-03-13 | End: 2020-03-15 | Stop reason: HOSPADM

## 2020-03-13 RX ORDER — ALBUTEROL SULFATE 90 UG/1
2 AEROSOL, METERED RESPIRATORY (INHALATION) EVERY 6 HOURS PRN
Status: DISCONTINUED | OUTPATIENT
Start: 2020-03-13 | End: 2020-03-15 | Stop reason: HOSPADM

## 2020-03-13 RX ORDER — HYDROCHLOROTHIAZIDE 12.5 MG/1
12.5 TABLET ORAL DAILY
Status: DISCONTINUED | OUTPATIENT
Start: 2020-03-13 | End: 2020-03-15

## 2020-03-13 RX ORDER — CETIRIZINE HYDROCHLORIDE 10 MG/1
10 TABLET ORAL DAILY
Status: DISCONTINUED | OUTPATIENT
Start: 2020-03-13 | End: 2020-03-15 | Stop reason: HOSPADM

## 2020-03-13 RX ORDER — SODIUM CHLORIDE 0.9 % (FLUSH) 0.9 %
10 SYRINGE (ML) INJECTION EVERY 12 HOURS SCHEDULED
Status: DISCONTINUED | OUTPATIENT
Start: 2020-03-13 | End: 2020-03-15 | Stop reason: HOSPADM

## 2020-03-13 RX ORDER — SODIUM CHLORIDE 9 MG/ML
INJECTION, SOLUTION INTRAVENOUS CONTINUOUS
Status: DISCONTINUED | OUTPATIENT
Start: 2020-03-13 | End: 2020-03-13 | Stop reason: HOSPADM

## 2020-03-13 RX ORDER — ACETAMINOPHEN 650 MG/1
650 SUPPOSITORY RECTAL EVERY 6 HOURS PRN
Status: DISCONTINUED | OUTPATIENT
Start: 2020-03-13 | End: 2020-03-15 | Stop reason: HOSPADM

## 2020-03-13 RX ORDER — FENTANYL CITRATE 50 UG/ML
50 INJECTION, SOLUTION INTRAMUSCULAR; INTRAVENOUS ONCE
Status: COMPLETED | OUTPATIENT
Start: 2020-03-13 | End: 2020-03-13

## 2020-03-13 RX ORDER — SODIUM CHLORIDE 9 MG/ML
INJECTION, SOLUTION INTRAVENOUS CONTINUOUS
Status: DISCONTINUED | OUTPATIENT
Start: 2020-03-13 | End: 2020-03-13

## 2020-03-13 RX ORDER — LISINOPRIL 20 MG/1
20 TABLET ORAL DAILY
Status: DISCONTINUED | OUTPATIENT
Start: 2020-03-13 | End: 2020-03-15

## 2020-03-13 RX ORDER — PANTOPRAZOLE SODIUM 40 MG/1
40 TABLET, DELAYED RELEASE ORAL
Status: DISCONTINUED | OUTPATIENT
Start: 2020-03-14 | End: 2020-03-15 | Stop reason: HOSPADM

## 2020-03-13 RX ORDER — MIRTAZAPINE 15 MG/1
15 TABLET, FILM COATED ORAL NIGHTLY
Status: DISCONTINUED | OUTPATIENT
Start: 2020-03-13 | End: 2020-03-15 | Stop reason: HOSPADM

## 2020-03-13 RX ORDER — SODIUM CHLORIDE 9 MG/ML
INJECTION, SOLUTION INTRAVENOUS CONTINUOUS
Status: DISCONTINUED | OUTPATIENT
Start: 2020-03-13 | End: 2020-03-14

## 2020-03-13 RX ORDER — DILTIAZEM HYDROCHLORIDE 5 MG/ML
10 INJECTION INTRAVENOUS ONCE
Status: COMPLETED | OUTPATIENT
Start: 2020-03-13 | End: 2020-03-13

## 2020-03-13 RX ORDER — SODIUM CHLORIDE/ALOE VERA
GEL (GRAM) NASAL DAILY
COMMUNITY

## 2020-03-13 RX ORDER — METOPROLOL SUCCINATE 25 MG/1
25 TABLET, EXTENDED RELEASE ORAL DAILY
Status: DISCONTINUED | OUTPATIENT
Start: 2020-03-13 | End: 2020-03-14

## 2020-03-13 RX ORDER — ACETAMINOPHEN 325 MG/1
650 TABLET ORAL EVERY 4 HOURS PRN
Status: DISCONTINUED | OUTPATIENT
Start: 2020-03-13 | End: 2020-03-14 | Stop reason: HOSPADM

## 2020-03-13 RX ORDER — INSULIN GLARGINE 100 [IU]/ML
20 INJECTION, SOLUTION SUBCUTANEOUS 2 TIMES DAILY
Status: DISCONTINUED | OUTPATIENT
Start: 2020-03-13 | End: 2020-03-15 | Stop reason: HOSPADM

## 2020-03-13 RX ORDER — NICOTINE POLACRILEX 4 MG
15 LOZENGE BUCCAL PRN
Status: DISCONTINUED | OUTPATIENT
Start: 2020-03-13 | End: 2020-03-15 | Stop reason: HOSPADM

## 2020-03-13 RX ORDER — MIDAZOLAM HYDROCHLORIDE 1 MG/ML
1 INJECTION INTRAMUSCULAR; INTRAVENOUS ONCE
Status: COMPLETED | OUTPATIENT
Start: 2020-03-13 | End: 2020-03-13

## 2020-03-13 RX ORDER — GUAIFENESIN 600 MG/1
600 TABLET, EXTENDED RELEASE ORAL 2 TIMES DAILY
Status: DISCONTINUED | OUTPATIENT
Start: 2020-03-13 | End: 2020-03-15 | Stop reason: HOSPADM

## 2020-03-13 RX ORDER — CIPROFLOXACIN 250 MG/1
250 TABLET, FILM COATED ORAL 2 TIMES DAILY
Status: DISCONTINUED | OUTPATIENT
Start: 2020-03-13 | End: 2020-03-15 | Stop reason: HOSPADM

## 2020-03-13 RX ADMIN — FENTANYL CITRATE 50 MCG: 50 INJECTION INTRAMUSCULAR; INTRAVENOUS at 10:18

## 2020-03-13 RX ADMIN — MIRTAZAPINE 15 MG: 15 TABLET, FILM COATED ORAL at 21:18

## 2020-03-13 RX ADMIN — SODIUM CHLORIDE: 9 INJECTION, SOLUTION INTRAVENOUS at 15:29

## 2020-03-13 RX ADMIN — INSULIN GLARGINE 20 UNITS: 100 INJECTION, SOLUTION SUBCUTANEOUS at 21:59

## 2020-03-13 RX ADMIN — Medication 10 ML: at 16:02

## 2020-03-13 RX ADMIN — GUAIFENESIN 600 MG: 600 TABLET, EXTENDED RELEASE ORAL at 21:16

## 2020-03-13 RX ADMIN — SODIUM CHLORIDE, PRESERVATIVE FREE 10 ML: 5 INJECTION INTRAVENOUS at 21:15

## 2020-03-13 RX ADMIN — BUDESONIDE AND FORMOTEROL FUMARATE DIHYDRATE 2 PUFF: 160; 4.5 AEROSOL RESPIRATORY (INHALATION) at 20:40

## 2020-03-13 RX ADMIN — IOPAMIDOL 99 ML: 755 INJECTION, SOLUTION INTRAVENOUS at 16:03

## 2020-03-13 RX ADMIN — LISINOPRIL 20 MG: 20 TABLET ORAL at 21:15

## 2020-03-13 RX ADMIN — HYDROCHLOROTHIAZIDE 12.5 MG: 12.5 TABLET ORAL at 21:15

## 2020-03-13 RX ADMIN — MIDAZOLAM 1 MG: 1 INJECTION INTRAMUSCULAR; INTRAVENOUS at 10:18

## 2020-03-13 RX ADMIN — ENOXAPARIN SODIUM 60 MG: 60 INJECTION SUBCUTANEOUS at 18:09

## 2020-03-13 RX ADMIN — CETIRIZINE HYDROCHLORIDE 10 MG: 10 TABLET, FILM COATED ORAL at 21:22

## 2020-03-13 RX ADMIN — SODIUM CHLORIDE: 9 INJECTION, SOLUTION INTRAVENOUS at 10:00

## 2020-03-13 RX ADMIN — DILTIAZEM HYDROCHLORIDE 10 MG: 5 INJECTION, SOLUTION INTRAVENOUS at 15:29

## 2020-03-13 RX ADMIN — CIPROFLOXACIN HYDROCHLORIDE 250 MG: 250 TABLET, FILM COATED ORAL at 21:18

## 2020-03-13 RX ADMIN — APIXABAN 5 MG: 5 TABLET, FILM COATED ORAL at 21:15

## 2020-03-13 RX ADMIN — METOPROLOL SUCCINATE 25 MG: 25 TABLET, EXTENDED RELEASE ORAL at 17:02

## 2020-03-13 RX ADMIN — DEXTROSE MONOHYDRATE 5 MG/HR: 50 INJECTION, SOLUTION INTRAVENOUS at 15:29

## 2020-03-13 RX ADMIN — Medication 10 ML: at 09:18

## 2020-03-13 ASSESSMENT — PAIN SCALES - GENERAL
PAINLEVEL_OUTOF10: 0
PAINLEVEL_OUTOF10: 3
PAINLEVEL_OUTOF10: 0
PAINLEVEL_OUTOF10: 5
PAINLEVEL_OUTOF10: 0
PAINLEVEL_OUTOF10: 0

## 2020-03-13 ASSESSMENT — PAIN DESCRIPTION - FREQUENCY: FREQUENCY: INTERMITTENT

## 2020-03-13 ASSESSMENT — ENCOUNTER SYMPTOMS
SORE THROAT: 0
CONSTIPATION: 0
NAUSEA: 0
SINUS PRESSURE: 0
RHINORRHEA: 0
ABDOMINAL PAIN: 0
DIARRHEA: 0
SHORTNESS OF BREATH: 0
VOMITING: 0
WHEEZING: 0
COUGH: 0

## 2020-03-13 ASSESSMENT — PAIN DESCRIPTION - DESCRIPTORS: DESCRIPTORS: SHARP

## 2020-03-13 ASSESSMENT — PAIN DESCRIPTION - LOCATION: LOCATION: CHEST

## 2020-03-13 ASSESSMENT — PAIN - FUNCTIONAL ASSESSMENT: PAIN_FUNCTIONAL_ASSESSMENT: 0-10

## 2020-03-13 ASSESSMENT — PAIN DESCRIPTION - ORIENTATION: ORIENTATION: RIGHT;UPPER

## 2020-03-13 NOTE — ED NOTES
Pt arrives to ED from same day surgery for tachycardia. Pt had right lung bx and returned to PACU at 1050. Reports pt was doing well then staff noticed fluctuating HR into the 150's with possible runs of vtach. Pt reports mild pain to right chest, site of lung biopsy. Denies complaints otherwise. Same day surgery reports pt just had 2 hour post op x ray prior to transport to ED. Pt is awake and alert. Cardiology at bedside.       Anisha Zelaya RN  03/13/20 [id number]

## 2020-03-13 NOTE — PROGRESS NOTES
Pt back from IR/Radiology per cart. Pt is awake and alert. Denies any pain. Has bandaid to Rt upper chest--no drainage. VS rehecked and stable and set for 15 min interval.   Skin W&D. Daughter at bedside. Given po fluids to drink.

## 2020-03-13 NOTE — PROGRESS NOTES
Medication History  [facility]    Patient Name: Jada George 1935     Medication history has been completed by: Loc Vines CPhT    Source(s) of information: Patient family and Insurance claims    Primary Care Physician: Savanna Daniel MD     Pharmacy: [facility]    Allergies as of 03/13/2020 - Review Complete 03/13/2020   Allergen Reaction Noted    Aspirin          Prior to Admission medications    Medication Sig Start Date End Date Taking? Authorizing Provider   saline nasal gel (AYR) GEL by Nasal route Daily   Yes Historical Provider, MD   ciprofloxacin (CIPRO) 250 MG tablet Take 1 tablet by mouth 2 times daily for 10 days 3/12/20 3/22/20 Yes Segundo Emanuel MD   traMADol (ULTRAM) 50 MG tablet Take 50 mg by mouth every 6 hours as needed for Pain.    Yes Historical Provider, MD   insulin lispro protamine & lispro (HUMALOG MIX 75/25) (75-25) 100 UNIT per ML SUSP injection vial Use 30 units twice daily 3/11/20  Yes Segundo Emanuel MD   guaiFENesin [facility]) 600 MG extended release tablet Take 1 tablet by mouth 2 times daily 3/6/20  Yes Segundo Emanuel MD   mirtazapine (REMERON) 15 MG tablet Take 1 tablet by mouth nightly 2/28/20  Yes Segundo Emanuel MD   tiotropium (SPIRIVA RESPIMAT) 2.5 MCG/ACT AERS inhaler Inhale 2 puffs into the lungs daily 1/30/20  Yes Segundo Emanuel MD   cetirizine (ZYRTEC) 10 MG tablet Take 1 tablet by mouth daily 1/27/20  Yes Segundo Emanuel MD   hydrochlorothiazide (MICROZIDE) 12.5 MG capsule Take 1 capsule by mouth daily 1/27/20  Yes Segundo Emanuel MD   amLODIPine (NORVASC) 5 MG tablet TAKE ONE TABLET BY MOUTH EVERY MORNING 1/27/20  Yes Segundo Emanuel MD   omeprazole (PRILOSEC) 20 MG delayed release capsule Take 1 capsule by mouth daily 1/27/20  Yes Segundo Emanuel MD   spironolactone (ALDACTONE) 25 MG tablet Take 1 tablet by mouth daily 1/27/20  Yes Rao Romance MD Anjum   budesonide-formoterol [facility]) 160-4.5 MCG/ACT AERO Inhale 2 puffs into the lungs 2 times daily 1/27/20  Yes Cl Chin MD   albuterol sulfate HFA (PROAIR HFA) 108 (90 Base) MCG/ACT inhaler Inhale 2 puffs into the lungs every 6 hours as needed for Wheezing 1/27/20  Yes Cl Chin MD   quinapril (ACCUPRIL) 40 MG tablet Take 1 tablet by mouth daily TAKE ONE TABLET BY MOUTH EVERY MORNING 1/27/20  Yes Cl Chin MD       Medications added or changed (ex. new medication, dosage change, interval change, formulation change):   Saline nasal gel new medication    Medications removed from list (include reason, ex. noncompliance, medication cost, therapy complete etc.):   Blood glucose monitor list clean  Test strips list clean up  Ceftin therapy completed  Flonase no longer taking  Shingrix list clean up    Comments:  Reviewed and updated med list per spouse and verified with claims  Per family at bed side patient did start cipro therapy yesterday    To my knowledge the above medication history is accurate as of 3/13/2020 6:07 PM.   Kody Salcido CPhT   3/13/2020 6:07 PM

## 2020-03-13 NOTE — PROGRESS NOTES
Pt's dinamp showing HR up to 156 at times. Pt has c/o being cold but is not shivering or tremoring. Apical pulse auscultated by stethescope and heard to be beating in fast bursts. Placed on monitor and appears to be having short runs of V-tach. Pt remain A&O--skin W&D. IR called to notifiy Dr Irma Castillo.

## 2020-03-13 NOTE — PROGRESS NOTES
Pt coughing fairly forcefully with exp wheeze noted at end of cough. Pt has small amt sputum produced with slight streaking of blood noted. Pt states has been having same cough now for some time prior to today and also having some blood in sputum.   Did not use regular inhalers this AM.

## 2020-03-13 NOTE — CONSULTS
Dictated --[id number]  Will check echo  Check xray r/o PTX  Possible afib with RVR with LBBB-rate control  Would follow      Appears now sinus with LBBB rate in 100

## 2020-03-13 NOTE — ED NOTES
Spoke with Dr Giovanni Rodriguez about ordering patient some dinner. She said cardiac diet was okay for now. I placed food order for patient.      Talib Guthrie RN  03/13/20 [id number]

## 2020-03-13 NOTE — PROGRESS NOTES
Patient in CT 2 for lung biopsy, A/O x 4 CARBONE, supine position, monitored, cytology paged,  scans completed and Dr. Kosta Gonzales called. Sterile tray setup, patients right chest prepped and draped by physician. Procedure: CT guided RUL Lung Biopsy with Dr. Kosta Gonzales    Sedation: Yes    Cytology/Pathology: Yes    Outcome: VSS, tolerated well, x5 20 gauge cores. Orders received. DSD to site.     Angelito NAVA

## 2020-03-13 NOTE — CONSULTS
[address] [place] [address], [facility]                                  CONSULTATION    PATIENT NAME: Mi Corona                    :        1935  MED REC NO:   [id number]                          ROOM:       ED25  ACCOUNT NO:   [de-identified]                           ADMIT DATE: 2020  PROVIDER:     Lillian Adamson MD    CONSULT DATE:  2020    INDICATION:  Cardiac arrhythmia. HISTORY OF PRESENT ILLNESS:  The patient is an 80-year-old male patient  who was admitted to the hospital back in early 2020. He came to the  hospital with having shortness of breath present. Dr. Christopher Pineda is his  pulmonologist and Dr. Tracy Ingram is his oncologist.  The patient was  found to have malignancy present. The patient was here to get a lung  biopsy done today. The patient underwent a CT-guided lung biopsy, and  post biopsy, the patient complained of shortness of breath and was found  to have palpitations and chest pain present; therefore, he was sent to  the ER. The patient has a history of having surgical resection in 2016 of the  lung cancer and underwent Taxol and carboplatin, full course, and  radiation treatment, and _____ completed treatment back in 2017. The  patient had a CT done earlier this month and found to have significant  metastatic disease, was noted in the right side. Therefore, he is  undergoing biopsy today. The patient is awake and alert, answering  questions, not in acute distress. The patient is in AFib with bundle-branch block with a rapid ventricular  response present. The patient had a heart catheterization done back in 2008. Left main  was patent, LAD had mild disease, circ had mild disease, RCA had mild  disease present. The patient had last echo done back in 2019. LV function was  preserved. Severe LVH was noted. Moderate aortic stenosis was noted.    Mean gradient was 14 mmHg present. The patient had a lobectomy done  back in 11/2018. Last stress test was done in 11/2019, negative for  ischemia, and left bundle-branch block was noted, otherwise stable. The patient had a CT chest done with contrast in 03/2020. The patient  had multiple lung nodules present. The patient had it post treatment of  the left lung. PAST MEDICAL HISTORY:  The patient has a history of having  nonobstructive coronary artery disease present, history of having left  bundle-branch block present, lung cancer, status post lobectomy done, I  believe on the left side at that time. History of diabetes,  hypertension, hard of hearing, and history of COPD present. I believe  he had a right upper lobe lobectomy done back in 03/2016. PAST SURGICAL HISTORY:  Tonsillectomy, left upper lobe lobectomy and  probably right upper lobe lobectomy also, history of non-small-cell lung  cancer, dental surgery. SOCIAL HISTORY:  He does not smoke and does not drink. He quit smoking  in 2015. ALLERGIES:  ASPIRIN. MEDICATIONS AT HOME:  He is on Cipro, Ultram, insulin, Mucinex, Ceftin,  Flonase, hydrochlorothiazide, Norvasc, Aldactone, and Accupril 40 mg  once a day. PHYSICAL EXAMINATION:  GENERAL:  The patient is awake and alert, answering questions. VITAL SIGNS:  Temperature is afebrile, pulse is in 110s, and blood  pressure is in 110s present. HEENT:  Head is normocephalic and atraumatic. Pupils are equal and  reactive. CHEST:  Equal expansion. Decreased breath sounds present. HEART:  Irregularly irregular. ABDOMEN:  Soft and nontender. EXTREMITIES:  No cyanosis or clubbing noted. LABORATORY DATA:  Labs are pending. EKG shows a bundle-branch block present, which is chronic for him, and  possible atrial fibrillation present with rapid ventricular response.     IMPRESSION AND PLAN:  This is an 17-year-old male patient with chronic  left bundle-branch block present, moderate aortic stenosis present, and  had a heart catheterization in 2008, found to have nonobstructive  coronary artery disease present. He is here with having shortness of  breath present. The patient underwent a lung biopsy and he is here with  shortness of breath and rapid ventricular response, possible AFib. Rule  out pneumothorax initially, and based on that, we will make further  recommendation. We will obtain echo also. Overall prognosis is  guarded.         Harpreet Crooks MD    D: 03/13/2020 13:06:38       T: 03/13/2020 14:19:58     [id number]  Job#: [id number]     Doc#: [id number]    CC:

## 2020-03-13 NOTE — ED NOTES
Bed: ED-25  Expected date:   Expected time:   Means of arrival:   Comments:  Same day     Brooke Maldonado RN  03/13/20 [id number]

## 2020-03-13 NOTE — PROGRESS NOTES
26  Dr Shi Gabriel notified of pt's heart rate and rhythm. He wants pt to be transferred to ED for further evaluation. Radiology here at bedside to do follow-up CXR. 1230  Pt taken down to ED per cart on monitor and olaced in Rm 25. HR running in 100's to 110's. Saline lock remains in place to [facility]. Report given to ED nurse Harleen Weeks. Pt's daughter down to ED with him.

## 2020-03-13 NOTE — ED PROVIDER NOTES
Emergency Department Encounter    Patient: Neel Noyola  MRN: [id number]  : 1935  Date of Evaluation: 3/13/2020  ED Provider:  Robert Stokes    Triage Chief Complaint:   Tachycardia and Chest Pain    HPI:  Neel Noyola is a 80 y.o. male past medical history significant for throat cancer status post treatment, as well as lung metastasis. He was getting a lung biopsy today, and in the postop period he began having palpitations, he was found to be in A. fib with RVR, which prompted patient come to the ED. He has no other complaints other than palpitations. He denies pain at the biopsy site, otherwise denies any chest pain or shortness of breath. Denies F/C, CP, SOB, N/V, abdominal pain, dysuria, diarrhea and constipatin. ROS:  Review of Systems   Constitutional: Negative for chills and fever. HENT: Negative for congestion, rhinorrhea, sinus pressure and sore throat. Eyes: Negative for visual disturbance. Respiratory: Negative for cough, shortness of breath and wheezing. Cardiovascular: Positive for palpitations. Negative for chest pain. Gastrointestinal: Negative for abdominal pain, constipation, diarrhea, nausea and vomiting. Genitourinary: Negative for dysuria, frequency and urgency. Musculoskeletal: Negative for arthralgias and myalgias. Skin: Negative for rash. Neurological: Negative for dizziness and syncope. Psychiatric/Behavioral: Negative for agitation, behavioral problems and confusion.          Past Medical History:   Diagnosis Date    CAD (coronary artery disease)     Dr. Yuridia Benton of left lung [facility]) 3/21/2016    Dr. Stephanie Maldonado COPD (chronic obstructive pulmonary disease) ([facility] [address]     Dr. Juliet Lay Diabetes mellitus [facility]) Dx 1970's    type II    Heart murmur, systolic     Nikolai (hard of hearing)     Bilateral Ears    Hyperlipidemia     Hypertension     follow with Dr Kameron Joseph Dx 48-17-50    Sees Dr. Miguel A Al scheduled for lung bx 12/31/2015/ Dr. Dary Castro Teeth missing     Upper And Lower    Thyroid disease     CT Guided Biopsy Left Thyroid Mass Done 3-4-16    Wears glasses      Past Surgical History:   Procedure Laterality Date    CARDIAC CATHETERIZATION      per old chart pt had cardiac cath and right femoral artery angiogram done 11/2008    DENTAL SURGERY      Teeth Extracted In Past    LUNG SURGERY Left 03/16/2016    Robotic assisted thorocotomy, DARLENE, node sampling and right supraclavicular lymph node biopsy    OTHER SURGICAL HISTORY  3-4-16    CT Guided Biopsy Left Thyroid Mass    OTHER SURGICAL HISTORY  12-31-15     CT Guided Lung Biopsy Done 12-31-15    TONSILLECTOMY  1960's     Family History   Problem Relation Age of Onset    Cancer Sister         Cancer Unsure What Kind    Arthritis Mother     Hearing Loss Mother     High Blood Pressure Mother     Early Death Father         In His 54's    Cancer Brother         Cancer Unsure What Kind    Early Death Sister         In Her Late 29's, Cancer Unsure What Kind    Cancer Sister         Cancer Unsure What Kind    Early Death Brother         In His 19's, [address] [facility] [address] Daughter         Breast Cancer    Early Death Daughter 44        Breast Cancer     Social History     Socioeconomic History    Marital status:       Spouse name: Not on file    Number of children: Not on file    Years of education: Not on file    Highest education level: Not on file   Occupational History    Occupation: retired     Comment:    Social Needs    Financial resource strain: Not on file    Food insecurity     Worry: Not on file     Inability: Not on file   Nexavis needs     Medical: Not on file     Non-medical: Not on file   Tobacco Use    Smoking status: Former Smoker     Packs/day: 1.00     Years: 68.00     Pack years: 68.00     Types: Cigarettes     Start date: 3/8/1947     Last attempt to quit: 12/7/2015     Years since quittin.2    Smokeless tobacco: Former User     Types: Chew     Quit date: 3/8/1965   Substance and Sexual Activity    Alcohol use: Yes     Alcohol/week: 0.0 standard drinks     Comment: \"Very Little Maybe Once A Year\"    Drug use: No    Sexual activity: Yes     Partners: Female   Lifestyle    Physical activity     Days per week: Not on file     Minutes per session: Not on file    Stress: Not on file   Relationships    Social connections     Talks on phone: Not on file     Gets together: Not on file     Attends Islam service: Not on file     Active member of club or organization: Not on file     Attends meetings of clubs or organizations: Not on file     Relationship status: Not on file    Intimate partner violence     Fear of current or ex partner: Not on file     Emotionally abused: Not on file     Physically abused: Not on file     Forced sexual activity: Not on file   Other Topics Concern    Not on file   Social History Narrative    Lives at [employer]    Exercise - a little walking     Current Facility-Administered Medications   Medication Dose Route Frequency Provider Last Rate Last Dose    0.9 % sodium chloride infusion   Intravenous Continuous Micah Ragland MD         Current Outpatient Medications   Medication Sig Dispense Refill    ciprofloxacin (CIPRO) 250 MG tablet Take 1 tablet by mouth 2 times daily for 10 days 20 tablet 0    blood glucose monitor strips Test 3 times a day & as needed for symptoms of irregular blood glucose. 100 strip 5    traMADol (ULTRAM) 50 MG tablet Take 50 mg by mouth every 6 hours as needed for Pain.       insulin lispro protamine & lispro (HUMALOG MIX 75/25) (75-25) 100 UNIT per ML SUSP injection vial Use 30 units twice daily 2 vial 5    guaiFENesin (MUCINEX) 600 MG extended release tablet Take 1 tablet by mouth 2 times daily 50 tablet 1    cefUROXime (CEFTIN) 250 MG tablet Take 1 tablet by mouth 2 times daily for 7 days 14 tablet 0    blood glucose monitor kit and supplies Test 1 times a day & as needed for symptoms of irregular blood glucose.  1 kit 0    mirtazapine (REMERON) 15 MG tablet Take 1 tablet by mouth nightly 30 tablet 3    tiotropium (SPIRIVA RESPIMAT) 2.5 MCG/ACT AERS inhaler Inhale 2 puffs into the lungs daily 1 Inhaler 5    cetirizine (ZYRTEC) 10 MG tablet Take 1 tablet by mouth daily 30 tablet 5    fluticasone (FLONASE) 50 MCG/ACT nasal spray 2 sprays by Each Nostril route daily 1 Bottle 5    hydrochlorothiazide (MICROZIDE) 12.5 MG capsule Take 1 capsule by mouth daily 30 capsule 5    amLODIPine (NORVASC) 5 MG tablet TAKE ONE TABLET BY MOUTH EVERY MORNING 30 tablet 5    omeprazole (PRILOSEC) 20 MG delayed release capsule Take 1 capsule by mouth daily 30 capsule 5    spironolactone (ALDACTONE) 25 MG tablet Take 1 tablet by mouth daily 30 tablet 5    budesonide-formoterol (SYMBICORT) 160-4.5 MCG/ACT AERO Inhale 2 puffs into the lungs 2 times daily 1 Inhaler 3    albuterol sulfate HFA (PROAIR HFA) 108 (90 Base) MCG/ACT inhaler Inhale 2 puffs into the lungs every 6 hours as needed for Wheezing 1 Inhaler 5    quinapril (ACCUPRIL) 40 MG tablet Take 1 tablet by mouth daily TAKE ONE TABLET BY MOUTH EVERY MORNING 90 tablet 3    blood glucose test strips (ACCU-CHEK FRANKY PLUS) strip USE ONE STRIP TO TEST TWICE A DAY 60 strip 11    UNABLE TO FIND Please administer two SHINGRIX vaccines 2-6 months apart 2 Units 0     Facility-Administered Medications Ordered in Other Encounters   Medication Dose Route Frequency Provider Last Rate Last Dose    sodium chloride flush 0.9 % injection 10 mL  10 mL Intravenous PRN Earl Burroughs MD   10 mL at 03/13/20 0918    acetaminophen (TYLENOL) tablet 650 mg  650 mg Oral Q4H PRN Earl Burroughs MD         Allergies   Allergen Reactions    Aspirin      \"Made Me Have Bleeding In My Stool\"       Nursing Notes Reviewed    Physical Exam:  Triage VS:    ED Triage Vitals [03/13/20 1240]   Enc Vitals Group      /71      Pulse 114      Resp 18      Temp       Temp src       SpO2 94 %      Weight 134 lb (60.8 kg)      Height 5' 8\" (1.727 m)      Head Circumference       Peak Flow       Pain Score       Pain Loc       Pain Edu? Excl. in [address]? Physical Exam  Vitals signs and nursing note reviewed. Constitutional:       General: He is not in acute distress. Appearance: Normal appearance. He is not ill-appearing. HENT:      Head: Normocephalic and atraumatic. Mouth/Throat:      Mouth: Mucous membranes are moist.   Eyes:      Conjunctiva/sclera: Conjunctivae normal.   Cardiovascular:      Rate and Rhythm: Tachycardia present. Rhythm irregular. Pulmonary:      Effort: Pulmonary effort is normal. No respiratory distress. Breath sounds: Normal breath sounds. No wheezing or rhonchi. Abdominal:      General: Abdomen is flat. Bowel sounds are normal. There is no distension. Palpations: Abdomen is soft. Tenderness: There is no abdominal tenderness. There is no guarding or rebound. Musculoskeletal: Normal range of motion. Skin:     General: Skin is warm and dry. Capillary Refill: Capillary refill takes less than 2 seconds. Neurological:      Mental Status: He is alert and oriented to person, place, and time. Mental status is at baseline. Psychiatric:         Mood and Affect: Mood normal.         Thought Content:  Thought content normal.         Judgment: Judgment normal.              I have reviewed and interpreted all of the currently available lab results from this visit (if applicable):  Results for orders placed or performed during the hospital encounter of 03/13/20   TSH without Reflex   Result Value Ref Range    TSH, High Sensitivity 0.713 0.270 - 4.20 uIu/ml   T4, free   Result Value Ref Range    T4 Free 1.15 0.9 - 1.8 NG/DL   Magnesium   Result Value Ref Range    Magnesium 2.0 1.8 - 2.4 mg/dl   CBC   Result Value Ref Range    WBC 11.2 (H) 4.0 - 10.5 K/CU tachycardia, Pvcs  Left bundle branch block  Abnormal ECG  When compared with ECG of 09-NOV-2018 21:13,  Current undetermined rhythm precludes rhythm comparison, needs review  Nonspecific T wave abnormality now evident in Inferior leads  Reconfirmed by Jocelyn Ballard MD, Fabio Javed ([id number]) on 3/13/2020 5:43:53 PM        Radiographs (if obtained):    [] Radiologist's Report Reviewed:  CT CHEST PULMONARY EMBOLISM W CONTRAST   Final Result   1. No evidence of acute pulmonary embolism or acute aortic disease. 2. Status post left upper lobectomy for the treatment of lung cancer. 3. Redemonstration of mediastinal and right hilar lymphadenopathy. 4. Worsening of the appearance of lung parenchyma with more pronounced   interstitial markings, multiple lung nodules and masses and pneumonic   infiltrate right upper lobe. There may be lymphangitic spread and metastatic   nodules. No active pleural disease. EKG (if obtained): (All EKG's are interpreted by myself in the absence of a cardiologist)  A. fib with RVR, heart rate 143, , QTc 437, left bundle branch block, patient does have a history of left bundle branch block. ST depression in V5. Chart review shows recent radiographs:  Ct Head Wo Contrast    Result Date: 3/3/2020  EXAMINATION: CT OF THE HEAD WITHOUT CONTRAST 3/3/2020 1:53 pm TECHNIQUE: CT of the head was performed without the administration of intravenous contrast. Dose modulation, iterative reconstruction, and/or weight based adjustment of the mA/kV was utilized to reduce the radiation dose to as low as reasonably achievable. COMPARISON: Brain MRI 02/01/2016 HISTORY: ORDERING SYSTEM PROVIDED HISTORY: Confusion TECHNOLOGIST PROVIDED HISTORY: Reason for Exam: Confusion Acuity: Acute Type of Exam: Initial FINDINGS: BRAIN/VENTRICLES:  No masses nor acute intracranial hemorrhage. New encephalomalacia in the right frontal lobe.   Otherwise intact gray/white matter differentiation without findings of acute ischemia. No mass effect nor midline shift. Patent basilar cisterns and foramen magnum. No hydrocephalus. Age-appropriate mild diffuse atrophy. Mild deep and periventricular white matter hypodensities likely due to chronic small vessel ischemia. Bilateral basal ganglia physiologic calcifications. ORBITS:  Visualized portions appear normal without acute abnormality. SINUSES:  Visualized portions appear normally pneumatized and aerated. SOFT TISSUES/SKULL:  No acute soft tissue abnormality. Mild atherosclerotic calcifications. No acute fracture. 1. No acute intracranial abnormality. 2. New encephalomalacia in the right frontal lobe related to interim infarction. Additional chronic changes as above. Ct Chest W Contrast    Result Date: 3/5/2020  EXAMINATION: CT OF THE CHEST WITH CONTRAST 3/3/2020 1:54 pm TECHNIQUE: CT of the chest was performed with the administration of intravenous contrast. Multiplanar reformatted images are provided for review. Dose modulation, iterative reconstruction, and/or weight based adjustment of the mA/kV was utilized to reduce the radiation dose to as low as reasonably achievable. COMPARISON: 05/20/2019, 11/30/2018, 12/04/2015 HISTORY: ORDERING SYSTEM PROVIDED HISTORY: Squamous cell carcinoma of left lung ([facility] [address] TECHNOLOGIST PROVIDED HISTORY: Reason for Exam:  Squamous cell carcinoma of left lung Acuity: Acute Type of Exam: Initial Relevant Medical/Surgical History: 75 ML [address] USED FINDINGS: Mediastinum: Heterogeneous enlarged thyroid gland with left thyroid lobe suspected cyst measuring 2.7 cm in diameter. Hypoattenuating 2 cm nodule in the right thyroid gland is noted. No significant change since prior CT exams dating back to at least 2015. No additional follow-up required for the incidental stable thyroid nodules likely representing multinodular thyroid goiter. Since prior examination there are multiple new and enlarging mediastinal lymph nodes. There is a high right paratracheal lymph node along the posterolateral margin of the trachea measuring 3.4 x 1.7 cm in size new since prior examination. There is a right paratracheal lymph node measuring 1.9 cm in short axis dimension previously measuring 8 mm in short axis dimension. There is a precarinal lymph node measuring 17 mm in short axis dimension previously measuring 15 mm. There is a subcarinal lymph node that measures 20 x 26 mm previously measuring up to 16 mm in short axis dimension. There is a right hilar lymph node that measures 18 mm in short axis dimension previously measuring approximately 7 mm. No prevascular or AP window lymphadenopathy. No evidence of left hilar lymphadenopathy. No evidence of axillary lymphadenopathy. There is mild tracheal deviation to the left secondary to the bulky right paratracheal lymphadenopathy. Thoracic aorta demonstrates no acute abnormality. Coronary and aortic atherosclerotic calcifications are noted. Heart and pericardium are unremarkable. Lungs/pleura: Stable left lung paramediastinal traction fibrosis and bronchiolectasis consistent with post radiation therapy changes. No change since prior. There is left lower lobe bronchiectasis with areas of mucus plugging unchanged since prior examination suggesting chronic mucus plugging and bronchiectasis. There is subpleural reticulation in the left lung with stable scattered small reticulonodular opacities throughout the subpleural region of the left lung not significantly changed since prior. In the right upper lobe there has been development of new irregular confluent masses within the subpleural region of the right upper lobe measuring up to 24 x 26 mm. The overall confluent region measures 7.8 cm in transverse dimension. There are additional subpleural nodules in the right upper lobe near the apex anteriorly measuring up to 14 mm in size.   There are irregular right upper lobe suprahilar nodules measuring up to 24 x 13 mm in size. There is an inferior right upper lobe nodule measuring 23 x 15 mm in size on image 67. There is peripheral subpleural ground-glass opacities suggesting postobstructive pneumonia or additional pulmonary metastasis. Additional smaller new spiculated nodules in the right lung apex on image 31 and 30 measure 10 mm and 9 mm in size. There is another new right apical pulmonary nodule on image 15 that measures approximately 12 mm in diameter. There are other scattered new solid noncalcified pulmonary nodules measuring 3-7 mm throughout the right upper lobe. There is mild bronchiectasis in the right lower lobe. No significant change in appearance of the right middle lobe or right lower lobe compared to prior examination although limited by respiratory motion artifact. No pleural effusion or pneumothorax. No evidence of overt pulmonary edema. Upper Abdomen: No evidence of focal adrenal nodule. Limited images of the upper abdomen demonstrate no acute abnormality. Respiratory motion artifact severely degrades upper abdomen. Soft Tissues/Bones: There is stable sclerotic densities in L1, T12, T10 and T9 that remain stable since at least 2015 suggesting bone islands rather than osseous metastasis. No acute abnormality of the osseous structures. Innumerable new pulmonary nodule and masses throughout the right upper lobe with confluent mass in the anterior aspect of the right upper lobe as well as multiple right apical spiculated pulmonary masses and nodules consistent with new metastatic disease. Interlobular septal thickening and ground-glass also in the right upper lobe may represent lymphangitic spread of tumor and/or postobstructive changes. Extensive new right paratracheal, precarinal, right hilar and subcarinal bulky lymphadenopathy consistent with new metastatic disease. Post treatment changes of the left lung are stable. Stable left lower lobe bronchiectasis and mucus plugging. Xr Chest Portable    Result Date: 3/13/2020  EXAMINATION: ONE XRAY VIEW OF THE CHEST 3/13/2020 11:58 am COMPARISON: CT on 03/13/2020. Chest x-ray on 03/13/2020. HISTORY: ORDERING SYSTEM PROVIDED HISTORY: sp lung biopsy 2 hours TECHNOLOGIST PROVIDED HISTORY: Reason for exam:->sp lung biopsy 2 hours Reason for Exam: SP LUNG BIOPSY 2 HR Follow-up FINDINGS: The cardiac silhouette is normal in size. Postsurgical changes are noted along the medial aspect of the left lung, towards the apex, unchanged. There is left-sided pleural thickening along the base, stable. There has been biopsy of a right-sided pleural-based mass along the anterior aspect of the right upper lobe. There is no evidence of a pneumothorax following biopsy. There are patchy areas of consolidation in the right lung which may be related to underlying pneumonia. Noncalcified pulmonary nodule in the right upper lobe towards the apex is not well visualized on the current exam. There are patchy areas of consolidation noted in the left lung base with areas of mucous plugging and bronchiectasis on the previous CT. 1. Stable bilateral lung infiltrates, concerning for pneumonia. 2. Biopsy of the pleural-based mass along the anterior aspect of the right upper lobe. No evidence of a pneumothorax. 3. Stable postsurgical changes along the medial aspect of the left lung, extending towards the apex. Xr Chest Portable    Result Date: 3/13/2020  EXAMINATION: ONE XRAY VIEW OF THE CHEST 3/13/2020 10:34 am COMPARISON: November 13, 2019. HISTORY: ORDERING SYSTEM PROVIDED HISTORY: post lung biopsy, right TECHNOLOGIST PROVIDED HISTORY: Reason for exam:->post lung biopsy, right Reason for Exam: post lung biopsy, right Relevant Medical/Surgical History: lung mass, left lung cancer, COPD, CAD FINDINGS: Cardiac and mediastinal contours are enlarged but appears reasonably similar to previous examination, accounting for differences in technique.  Increased right admission. 12:53 PM  Discussed with Dr. Colonel Quintana. Clinical Impression:  1. Atrial fibrillation with RVR ([facility] [address]      Disposition referral (if applicable):  Tristin Ugalde MD  [address] [place] [address]  [phone]          Disposition medications (if applicable):  New Prescriptions    No medications on file       Comment: Please note this report has been produced using speech recognition software and may contain errors related to that system including errors in grammar, punctuation, and spelling, as well as words and phrases that may be inappropriate. Efforts were made to edit the dictations.         [address] [facility],   03/13/20 9254

## 2020-03-14 LAB
ALBUMIN SERPL-MCNC: 2.6 GM/DL (ref 3.4–5)
ALP BLD-CCNC: 106 IU/L (ref 40–128)
ALT SERPL-CCNC: 10 U/L (ref 10–40)
ANION GAP SERPL CALCULATED.3IONS-SCNC: 10 MMOL/L (ref 4–16)
AST SERPL-CCNC: 13 IU/L (ref 15–37)
BILIRUB SERPL-MCNC: 0.2 MG/DL (ref 0–1)
BUN BLDV-MCNC: 14 MG/DL (ref 6–23)
CALCIUM SERPL-MCNC: 9.3 MG/DL (ref 8.3–10.6)
CHLORIDE BLD-SCNC: 103 MMOL/L (ref 99–110)
CO2: 26 MMOL/L (ref 21–32)
CREAT SERPL-MCNC: 1.2 MG/DL (ref 0.9–1.3)
ESTIMATED AVERAGE GLUCOSE: 263 MG/DL
GFR AFRICAN AMERICAN: >60 ML/MIN/1.73M2
GFR NON-AFRICAN AMERICAN: 58 ML/MIN/1.73M2
GLUCOSE BLD-MCNC: 118 MG/DL (ref 70–99)
GLUCOSE BLD-MCNC: 136 MG/DL (ref 70–99)
GLUCOSE BLD-MCNC: 181 MG/DL (ref 70–99)
GLUCOSE BLD-MCNC: 87 MG/DL (ref 70–99)
GLUCOSE BLD-MCNC: 94 MG/DL (ref 70–99)
HBA1C MFR BLD: 10.8 % (ref 4.2–6.3)
INR BLD: 1.29 INDEX
POTASSIUM SERPL-SCNC: 4.5 MMOL/L (ref 3.5–5.1)
PROTHROMBIN TIME: 15.7 SECONDS (ref 11.7–14.5)
SODIUM BLD-SCNC: 139 MMOL/L (ref 135–145)
TOTAL PROTEIN: 5.8 GM/DL (ref 6.4–8.2)

## 2020-03-14 PROCEDURE — 80053 COMPREHEN METABOLIC PANEL: CPT

## 2020-03-14 PROCEDURE — 82962 GLUCOSE BLOOD TEST: CPT

## 2020-03-14 PROCEDURE — 96366 THER/PROPH/DIAG IV INF ADDON: CPT

## 2020-03-14 PROCEDURE — 2500000003 HC RX 250 WO HCPCS: Performed by: INTERNAL MEDICINE

## 2020-03-14 PROCEDURE — 94761 N-INVAS EAR/PLS OXIMETRY MLT: CPT

## 2020-03-14 PROCEDURE — 2580000003 HC RX 258: Performed by: INTERNAL MEDICINE

## 2020-03-14 PROCEDURE — 94640 AIRWAY INHALATION TREATMENT: CPT

## 2020-03-14 PROCEDURE — 2140000000 HC CCU INTERMEDIATE R&B

## 2020-03-14 PROCEDURE — 83036 HEMOGLOBIN GLYCOSYLATED A1C: CPT

## 2020-03-14 PROCEDURE — 6370000000 HC RX 637 (ALT 250 FOR IP): Performed by: INTERNAL MEDICINE

## 2020-03-14 PROCEDURE — 99223 1ST HOSP IP/OBS HIGH 75: CPT | Performed by: INTERNAL MEDICINE

## 2020-03-14 PROCEDURE — 85610 PROTHROMBIN TIME: CPT

## 2020-03-14 RX ORDER — METOPROLOL SUCCINATE 50 MG/1
50 TABLET, EXTENDED RELEASE ORAL EVERY EVENING
Status: DISCONTINUED | OUTPATIENT
Start: 2020-03-14 | End: 2020-03-14

## 2020-03-14 RX ORDER — METOPROLOL SUCCINATE 50 MG/1
50 TABLET, EXTENDED RELEASE ORAL 2 TIMES DAILY
Status: DISCONTINUED | OUTPATIENT
Start: 2020-03-14 | End: 2020-03-15 | Stop reason: HOSPADM

## 2020-03-14 RX ADMIN — MIRTAZAPINE 15 MG: 15 TABLET, FILM COATED ORAL at 20:34

## 2020-03-14 RX ADMIN — DILTIAZEM HYDROCHLORIDE 30 MG: 30 TABLET, FILM COATED ORAL at 12:03

## 2020-03-14 RX ADMIN — HYDROCHLOROTHIAZIDE 12.5 MG: 12.5 TABLET ORAL at 08:44

## 2020-03-14 RX ADMIN — GUAIFENESIN 600 MG: 600 TABLET, EXTENDED RELEASE ORAL at 08:44

## 2020-03-14 RX ADMIN — GUAIFENESIN 600 MG: 600 TABLET, EXTENDED RELEASE ORAL at 20:34

## 2020-03-14 RX ADMIN — APIXABAN 5 MG: 5 TABLET, FILM COATED ORAL at 20:34

## 2020-03-14 RX ADMIN — CIPROFLOXACIN HYDROCHLORIDE 250 MG: 250 TABLET, FILM COATED ORAL at 08:44

## 2020-03-14 RX ADMIN — LISINOPRIL 20 MG: 20 TABLET ORAL at 08:44

## 2020-03-14 RX ADMIN — METOPROLOL SUCCINATE 25 MG: 25 TABLET, EXTENDED RELEASE ORAL at 08:45

## 2020-03-14 RX ADMIN — PANTOPRAZOLE SODIUM 40 MG: 40 TABLET, DELAYED RELEASE ORAL at 06:57

## 2020-03-14 RX ADMIN — APIXABAN 5 MG: 5 TABLET, FILM COATED ORAL at 08:45

## 2020-03-14 RX ADMIN — DEXTROSE MONOHYDRATE 5 MG/HR: 50 INJECTION, SOLUTION INTRAVENOUS at 08:46

## 2020-03-14 RX ADMIN — BUDESONIDE AND FORMOTEROL FUMARATE DIHYDRATE 2 PUFF: 160; 4.5 AEROSOL RESPIRATORY (INHALATION) at 19:56

## 2020-03-14 RX ADMIN — METOPROLOL SUCCINATE 50 MG: 50 TABLET, EXTENDED RELEASE ORAL at 20:34

## 2020-03-14 RX ADMIN — CIPROFLOXACIN HYDROCHLORIDE 250 MG: 250 TABLET, FILM COATED ORAL at 20:33

## 2020-03-14 RX ADMIN — SALINE NASAL SPRAY: 1.5 SOLUTION NASAL at 08:57

## 2020-03-14 RX ADMIN — SODIUM CHLORIDE, PRESERVATIVE FREE 10 ML: 5 INJECTION INTRAVENOUS at 20:34

## 2020-03-14 RX ADMIN — BUDESONIDE AND FORMOTEROL FUMARATE DIHYDRATE 2 PUFF: 160; 4.5 AEROSOL RESPIRATORY (INHALATION) at 09:26

## 2020-03-14 RX ADMIN — METOPROLOL SUCCINATE 50 MG: 50 TABLET, EXTENDED RELEASE ORAL at 13:29

## 2020-03-14 RX ADMIN — SODIUM CHLORIDE: 9 INJECTION, SOLUTION INTRAVENOUS at 04:56

## 2020-03-14 RX ADMIN — INSULIN GLARGINE 20 UNITS: 100 INJECTION, SOLUTION SUBCUTANEOUS at 08:45

## 2020-03-14 RX ADMIN — CETIRIZINE HYDROCHLORIDE 10 MG: 10 TABLET, FILM COATED ORAL at 08:45

## 2020-03-14 ASSESSMENT — PAIN SCALES - GENERAL
PAINLEVEL_OUTOF10: 0

## 2020-03-14 NOTE — PLAN OF CARE
Problem: SAFETY  Goal: Free from accidental physical injury  Outcome: Ongoing     Problem: PAIN  Goal: Patient's pain/discomfort is manageable  Outcome: Ongoing     Problem: KNOWLEDGE DEFICIT  Goal: Patient/S.O. demonstrates understanding of disease process, treatment plan, medications, and discharge instructions.   Outcome: Ongoing     Problem: Nutrition  Goal: Optimal nutrition therapy  Outcome: Ongoing     Problem: Tissue Perfusion - Cardiopulmonary, Altered:  Goal: Absence of angina  Description: Absence of angina  Outcome: Ongoing  Goal: Hemodynamic stability will improve  Description: Hemodynamic stability will improve  Outcome: Ongoing

## 2020-03-14 NOTE — H&P
Mickie Correa MD  History and Physical    Patient:  Arpita Spring    CHIEF COMPLAINT:   afib RVR    HISTORY OF PRESENT ILLNESS:   The patient is a 80 y.o. male who presents with atrial fibrillation with RVR. Patient has a history of lung carcinoma with recently diagnosed diffuse metastatic disease involving much of the right lung. He had been admitted to the hospital last week for pneumonia and was treated with antibiotics and steroids with improvement of his symptoms. He was discharged and was seen in the office after he developed dysuria, urgency, frequency. Urine dipstick was abnormal but urine culture came back negative. However, patient significantly improved with Cipro. Patient underwent a CT-guided lung biopsy yesterday, but in postop was found to have atrial fibrillation with RVR. He subsequently sent to the emergency room. He had a CT scan to evaluate for pulmonary embolism but this was negative for PE though did show his metastatic disease. Echocardiogram showed ejection fraction of 40 to 50%, moderate LVH, moderate aortic insufficiency. Patient was started on Cardizem drip and his pulse has improved to around 100 though he remains in atrial fibrillation. Strength is better this morning. Sugars are controlled.     Past Medical History:        Diagnosis Date    CAD (coronary artery disease)     Dr. Jeremy Bucio of left lung [facility]) 3/21/2016    Dr. Geovanny Florentino COPD [facility]chronic obstructive pulmonary disease) [facility]     Dr. Dory Morrell Diabetes mellitus [facility]) Dx 1970's    type II    Heart murmur, systolic     Craig (hard of hearing)     Bilateral Ears    Hyperlipidemia     Hypertension     follow with Dr Sherry De Jesus Dx 12-31-15    Sees Dr. Kana Mack mass     scheduled for lung bx 12/31/2015/ Dr. uYliya Albarado Teeth missing     Upper And Lower    Thyroid disease     CT Guided Biopsy Left Thyroid Mass Done 3-4-16    Wears glasses        Past Surgical History: Procedure Laterality Date    CARDIAC CATHETERIZATION      per old chart pt had cardiac cath and right femoral artery angiogram done 11/2008    DENTAL SURGERY      Teeth Extracted In Past    LUNG SURGERY Left 03/16/2016    Robotic assisted thorocotomy, DARLENE, node sampling and right supraclavicular lymph node biopsy    OTHER SURGICAL HISTORY  3-4-16    CT Guided Biopsy Left Thyroid Mass    OTHER SURGICAL HISTORY  12-31-15     CT Guided Lung Biopsy Done 12-31-15    TONSILLECTOMY  1960's       Medications Prior to Admission:    Prior to Admission medications    Medication Sig Start Date End Date Taking? Authorizing Provider   saline nasal gel (AYR) GEL by Nasal route Daily   Yes Historical Provider, MD   ciprofloxacin (CIPRO) 250 MG tablet Take 1 tablet by mouth 2 times daily for 10 days 3/12/20 3/22/20 Yes Thaddeus Vo MD   traMADol (ULTRAM) 50 MG tablet Take 50 mg by mouth every 6 hours as needed for Pain.    Yes Historical Provider, MD   insulin lispro protamine & lispro (HUMALOG MIX 75/25) (75-25) 100 UNIT per ML SUSP injection vial Use 30 units twice daily 3/11/20  Yes Thaddeus Vo MD   guaiFENesin [facility]) 600 MG extended release tablet Take 1 tablet by mouth 2 times daily 3/6/20  Yes Thaddeus Vo MD   mirtazapine (REMERON) 15 MG tablet Take 1 tablet by mouth nightly 2/28/20  Yes Thaddeus Vo MD   tiotropium (SPIRIVA RESPIMAT) 2.5 MCG/ACT AERS inhaler Inhale 2 puffs into the lungs daily 1/30/20  Yes Thaddeus Vo MD   cetirizine (ZYRTEC) 10 MG tablet Take 1 tablet by mouth daily 1/27/20  Yes Thaddeus Vo MD   hydrochlorothiazide (MICROZIDE) 12.5 MG capsule Take 1 capsule by mouth daily 1/27/20  Yes Thaddeus Vo MD   amLODIPine (NORVASC) 5 MG tablet TAKE ONE TABLET BY MOUTH EVERY MORNING 1/27/20  Yes Thaddeus Vo MD   omeprazole (PRILOSEC) 20 MG delayed release capsule Take 1 capsule by mouth daily 1/27/20  Yes Vinicius Reynolds Al Saini MD   spironolactone (ALDACTONE) 25 MG tablet Take 1 tablet by mouth daily 1/27/20  Yes Cassidy Meyer MD   budesonide-formoterol [facility]) 160-4.5 MCG/ACT AERO Inhale 2 puffs into the lungs 2 times daily 1/27/20  Yes Cassidy Meyer MD   albuterol sulfate HFA (PROAIR HFA) 108 (90 Base) MCG/ACT inhaler Inhale 2 puffs into the lungs every 6 hours as needed for Wheezing 1/27/20  Yes Cassidy Meyer MD   quinapril (ACCUPRIL) 40 MG tablet Take 1 tablet by mouth daily TAKE ONE TABLET BY MOUTH EVERY MORNING 1/27/20  Yes Cassidy Meyer MD       Allergies:  Aspirin    Social History:   TOBACCO:   reports that he quit smoking about 4 years ago. His smoking use included cigarettes. He started smoking about 73 years ago. He has a 68.00 pack-year smoking history. He quit smokeless tobacco use about 55 years ago. His smokeless tobacco use included chew. ETOH:   reports current alcohol use. Family History:       Problem Relation Age of Onset    Cancer Sister         Cancer Unsure What Kind    Arthritis Mother     Hearing Loss Mother     High Blood Pressure Mother     Early Death Father         In His 52's    Cancer Brother         Cancer Unsure What Kind    Early Death Sister         In Her Late 29's, Cancer Unsure What Kind    Cancer Sister         Cancer Unsure What Kind    Early Death Brother         In His 19's, [address] [place] [address] In [address] [place] [address] Daughter         Breast Cancer    Early Death Daughter 44        Breast Cancer       REVIEW OF SYSTEMS:  Other systems negative except as noted above. Physical Exam:    Vitals: /80   Pulse 100   Temp 97.4 °F (36.3 °C)   Resp 18   Ht 5' 8\" (1.727 m)   Wt 122 lb 6.4 oz (55.5 kg)   SpO2 96%   BMI 18.61 kg/m²   General appearance: appears stated age and cooperative  Skin: Skin color, texture, turgor normal. No rashes or lesions. HEENT: No scleral icterus. Conjunctiva pink.  Mucous membranes moist. No gum lesions, tongue lesions. Neck: Supple, trachea midline. No JVD. No thyromegaly, no masses. Lungs: Appropriate chest expansion. Scattered wheezes. No use of accessory muscles  Heart: Irregularly irregular with a 2 out of 6 systolic murmur. Carotids are brisk bilaterally. Abdomen:  Soft, non-tender, non-distended. There is no organomegaly, no masses. Bowel sounds are normo-active  Extremities: No cyanosis, clubbing. 1+ bilateral edema to distal calf edema. Lymphatic: No cervical or supraclavicular lymphadenopathy  Vascular: Dorsalis pedis and posterior tibial pulses 2+ bilaterally  Neurologic: Grossly nonfocal  Psych: Alert and oriented, insight and judgement WNL; no depression or anxiety         Assessment and Plan   Active Problems:    Atrial fibrillation with RVR (HCC)  Resolved Problems:    * No resolved hospital problems. *    Patient is an 80-year-old male with history of lung cancer now with metastatic disease which likely is lung cancer though biopsies pending. Patient presents the emergency room with atrial fibrillation with rapid ventricular response. Patient has responded to IV Cardizem and he has been started on Eliquis. I will have the patient on oral Cardizem today and he is on metoprolol. At this point I think our goal should be rate control and anticoagulation as opposed to rhythm control. Patient has a PET scan scheduled for Monday and I believe we need to work promptly to get this PET scan and the results of the biopsy so we can determine the best options for treatment. I have decreased his insulin while he is in the hospital.  I will encourage him to ambulate today and assuming he does well with the change to oral Cardizem will be able to discharge him tomorrow.     Further management will depend on the patient's hospital course  [facility]

## 2020-03-14 NOTE — PROGRESS NOTES
up in bed eating lunch  · Wound Type: None  · Current Nutrition Therapies:  · Oral Diet Orders: Carb Control 4 Carbs/Meal   · Oral Diet intake: Unable to assess  · Oral Nutrition Supplement (ONS) Orders: None  · ONS intake:    · Anthropometric Measures:  · Ht: 5' 8\" (172.7 cm)   · Current Body Wt: 122 lb 5.7 oz (55.5 kg)  · Admission Body Wt: 134 lb (60.8 kg)(stated weight )  · Usual Body Wt: 144 lb (65.3 kg)  · % Weight Change:  ,  weight loss more than 10% in past 4 months   · Ideal Body Wt: 154 lb (69.9 kg), % Ideal Body 79  · BMI Classification: BMI 18.5 - 24.9 Normal Weight(BMI-18.6)    Nutrition Interventions:   Continue current diet, Start ONS  Continued Inpatient Monitoring, Education not appropriate at this time, Coordination of Care    Nutrition Evaluation:   · Evaluation: Goals set   · Goals: Patient will consume at least 75% of meals and supplements during stay     · Monitoring: Meal Intake, Supplement Intake, Pertinent Labs, Weight, Diet Tolerance      Electronically signed by Stephens Severe, RD, LD on 3/14/20 at 12:00 PM EDT    Contact Number: [phone]

## 2020-03-14 NOTE — PROGRESS NOTES
Daily Progress Note     patient is awake alert  Feeling better  Remain in sinus with LBBB---PAFIB started on [facility]  Will change to Toprol 50 bid if heart rate is stable--ok to d/c home  Lung CA for PET scan on Monday   HTN on meds -if bp is low --lower ACEI    Summary--echo    Left ventricular systolic function is abnormal.   Ejection fraction is visually estimated at 40-50%. Septal and apical wall segments appear hypokinetic. Moderate left ventricular hypertrophy. Sclerotic aortic valve restricting the non-coronary cusp. Moderate eccentric aortic insufficiency. Mild aortic stenosis; mean PmmHg. No evidence of any pericardial effusion. The patient had a heart catheterization done back in 2008. Left main  was patent, LAD had mild disease, circ had mild disease, RCA had mild  disease present.     The patient had last echo done back in 2019. LV function was  preserved. Severe LVH was noted. Moderate aortic stenosis was noted. Mean gradient was 14 mmHg present. The patient had a lobectomy done  back in 2018. Last stress test was done in 2019, negative for  ischemia, and left bundle-branch block was noted, otherwise stable.     The patient had a CT chest done with contrast in 2020. The patient  had multiple lung nodules present. The patient had it post treatment of  the left lung.     PAST MEDICAL HISTORY:  The patient has a history of having  nonobstructive coronary artery disease present, history of having left  bundle-branch block present, lung cancer, status post lobectomy done, I  believe on the left side at that time. History of diabetes,  hypertension, hard of hearing, and history of COPD present. I believe  he had a right upper lobe lobectomy done back in 2016.     PAST SURGICAL HISTORY:  Tonsillectomy, left upper lobe lobectomy and  probably right upper lobe lobectomy also, history of non-small-cell lung  cancer, dental surgery.   Objective:   BP (!) 152/87 Pulse 103   Temp 97.4 °F (36.3 °C)   Resp 18   Ht 5' 8\" (1.727 m)   Wt 122 lb 6.4 oz (55.5 kg)   SpO2 96%   BMI 18.61 kg/m²       Intake/Output Summary (Last 24 hours) at 3/14/2020 1350  Last data filed at 3/14/2020 0703  Gross per 24 hour   Intake 1150.33 ml   Output --   Net 1150.33 ml       Medications:   Scheduled Meds:   metoprolol succinate  50 mg Oral BID    budesonide-formoterol  2 puff Inhalation BID    cetirizine  10 mg Oral Daily    ciprofloxacin  250 mg Oral BID    guaiFENesin  600 mg Oral BID    hydrochlorothiazide  12.5 mg Oral Daily    mirtazapine  15 mg Oral Nightly    pantoprazole  40 mg Oral QAM AC    lisinopril  20 mg Oral Daily    sodium chloride   Nasal Daily    tiotropium  2 puff Inhalation Daily    sodium chloride flush  10 mL Intravenous 2 times per day    insulin glargine  20 Units Subcutaneous BID    insulin lispro  5 Units Subcutaneous TID WC    insulin lispro  0-6 Units Subcutaneous TID WC    insulin lispro  0-3 Units Subcutaneous Nightly    apixaban  5 mg Oral BID      Infusions:   dextrose        PRN Meds:  sodium chloride flush, albuterol sulfate HFA, traMADol, sodium chloride flush, acetaminophen **OR** acetaminophen, polyethylene glycol, promethazine **OR** ondansetron, glucose, dextrose, glucagon (rDNA), dextrose       Physical Exam:  Vitals:    03/14/20 1315   BP: (!) 152/87   Pulse: 103   Resp:    Temp:    SpO2:         General: awake alert   Chest: Nontender  Cardiac: sinus   Lungs:Clear to auscultation and percussion. Abdomen: Soft, NT, ND, +BS  Extremities:  No edema   Vascular:  Equal 2+ peripheral pulses.         Lab Data:  CBC:   Recent Labs     03/13/20  1245   WBC 11.2*   HGB 15.1   HCT 49.5   MCV 93.4        BMP:   Recent Labs     03/13/20  1245 03/14/20  0530    139   K 4.0 4.5    103   CO2 27 26   BUN 17 14   CREATININE 1.3 1.2     LIVER PROFILE:   Recent Labs     03/13/20  1245 03/14/20  0530   AST 16 13*   ALT 12 10   BILITOT 0.2 0.2   ALKPHOS 116 106     PT/INR:   Recent Labs     03/13/20  1245 03/14/20  0530   PROTIME 12.8 15.7*   INR 1.06 1.29     APTT:   Recent Labs     03/13/20  1245   APTT 36.6     BNP:  No results for input(s): BNP in the last 72 hours.       Assessment:  Patient Active Problem List    Diagnosis Date Noted    Atrial fibrillation with RVR ([address] 03/13/2020    Severe malnutrition ([address] 03/04/2020    Hypoxia 03/03/2020    Pneumonia of right middle lobe due to infectious organism ([address]     Essential hypertension 10/30/2018    Hypercholesterolemia 10/30/2018    Controlled type 2 diabetes mellitus with stage 3 chronic kidney disease, with long-term current use of insulin ([address] 10/30/2018    Pulmonary emphysema ([address] 10/30/2018    Nonrheumatic aortic valve stenosis 10/30/2018    Squamous cell carcinoma of left lung ([address] 10/30/2018       Vivi Schmitz MD 3/14/2020 1:50 PM

## 2020-03-15 VITALS
DIASTOLIC BLOOD PRESSURE: 53 MMHG | OXYGEN SATURATION: 95 % | SYSTOLIC BLOOD PRESSURE: 114 MMHG | WEIGHT: 119.3 LBS | TEMPERATURE: 97.5 F | BODY MASS INDEX: 18.08 KG/M2 | RESPIRATION RATE: 20 BRPM | HEART RATE: 86 BPM | HEIGHT: 68 IN

## 2020-03-15 PROBLEM — I48.0 PAROXYSMAL ATRIAL FIBRILLATION (HCC): Status: ACTIVE | Noted: 2020-03-15

## 2020-03-15 LAB
GLUCOSE BLD-MCNC: 167 MG/DL (ref 70–99)
GLUCOSE BLD-MCNC: 267 MG/DL (ref 70–99)
GLUCOSE BLD-MCNC: 48 MG/DL (ref 70–99)

## 2020-03-15 PROCEDURE — 2580000003 HC RX 258: Performed by: INTERNAL MEDICINE

## 2020-03-15 PROCEDURE — 96375 TX/PRO/DX INJ NEW DRUG ADDON: CPT

## 2020-03-15 PROCEDURE — 94761 N-INVAS EAR/PLS OXIMETRY MLT: CPT

## 2020-03-15 PROCEDURE — 99239 HOSP IP/OBS DSCHRG MGMT >30: CPT | Performed by: INTERNAL MEDICINE

## 2020-03-15 PROCEDURE — 6370000000 HC RX 637 (ALT 250 FOR IP): Performed by: INTERNAL MEDICINE

## 2020-03-15 PROCEDURE — 82962 GLUCOSE BLOOD TEST: CPT

## 2020-03-15 PROCEDURE — 94640 AIRWAY INHALATION TREATMENT: CPT

## 2020-03-15 RX ORDER — METOPROLOL SUCCINATE 50 MG/1
50 TABLET, EXTENDED RELEASE ORAL 2 TIMES DAILY
Qty: 30 TABLET | Refills: 3 | Status: SHIPPED | OUTPATIENT
Start: 2020-03-15 | End: 2020-03-27

## 2020-03-15 RX ORDER — AMIODARONE HYDROCHLORIDE 200 MG/1
200 TABLET ORAL DAILY
Status: DISCONTINUED | OUTPATIENT
Start: 2020-03-15 | End: 2020-03-15 | Stop reason: HOSPADM

## 2020-03-15 RX ORDER — AMIODARONE HYDROCHLORIDE 200 MG/1
200 TABLET ORAL DAILY
Qty: 30 TABLET | Refills: 3 | Status: SHIPPED | OUTPATIENT
Start: 2020-03-15

## 2020-03-15 RX ORDER — LISINOPRIL 10 MG/1
10 TABLET ORAL DAILY
Status: DISCONTINUED | OUTPATIENT
Start: 2020-03-16 | End: 2020-03-15

## 2020-03-15 RX ADMIN — DEXTROSE 50 % IN WATER (D50W) INTRAVENOUS SYRINGE 12.5 G: at 06:50

## 2020-03-15 RX ADMIN — BUDESONIDE AND FORMOTEROL FUMARATE DIHYDRATE 2 PUFF: 160; 4.5 AEROSOL RESPIRATORY (INHALATION) at 08:32

## 2020-03-15 RX ADMIN — TIOTROPIUM BROMIDE INHALATION SPRAY 2 PUFF: 3.12 SPRAY, METERED RESPIRATORY (INHALATION) at 08:33

## 2020-03-15 RX ADMIN — CIPROFLOXACIN HYDROCHLORIDE 250 MG: 250 TABLET, FILM COATED ORAL at 08:23

## 2020-03-15 RX ADMIN — APIXABAN 5 MG: 5 TABLET, FILM COATED ORAL at 08:22

## 2020-03-15 RX ADMIN — SALINE NASAL SPRAY: 1.5 SOLUTION NASAL at 08:22

## 2020-03-15 RX ADMIN — METOPROLOL SUCCINATE 50 MG: 50 TABLET, EXTENDED RELEASE ORAL at 08:23

## 2020-03-15 RX ADMIN — HYDROCHLOROTHIAZIDE 12.5 MG: 12.5 TABLET ORAL at 08:23

## 2020-03-15 RX ADMIN — GUAIFENESIN 600 MG: 600 TABLET, EXTENDED RELEASE ORAL at 08:23

## 2020-03-15 RX ADMIN — AMIODARONE HYDROCHLORIDE 200 MG: 200 TABLET ORAL at 11:55

## 2020-03-15 RX ADMIN — CETIRIZINE HYDROCHLORIDE 10 MG: 10 TABLET, FILM COATED ORAL at 08:23

## 2020-03-15 RX ADMIN — ACETAMINOPHEN 650 MG: 325 TABLET ORAL at 05:20

## 2020-03-15 RX ADMIN — PANTOPRAZOLE SODIUM 40 MG: 40 TABLET, DELAYED RELEASE ORAL at 05:20

## 2020-03-15 RX ADMIN — LISINOPRIL 20 MG: 20 TABLET ORAL at 08:23

## 2020-03-15 RX ADMIN — SODIUM CHLORIDE, PRESERVATIVE FREE 10 ML: 5 INJECTION INTRAVENOUS at 08:25

## 2020-03-15 RX ADMIN — SALINE NASAL SPRAY: 1.5 SOLUTION NASAL at 05:20

## 2020-03-15 ASSESSMENT — PAIN SCALES - GENERAL
PAINLEVEL_OUTOF10: 6
PAINLEVEL_OUTOF10: 0

## 2020-03-15 NOTE — PROGRESS NOTES
Went through discharge instructions with pt and daughter. Gave scripts-amiodarone and the other ones were sent as escripts. answered all questions. Took pt down in wc with daughter. [facility] order was faxed to [facility] [phone]. Gave eliquis voucher to daughter.

## 2020-03-15 NOTE — DISCHARGE SUMMARY
Patient ID: Kary Carl      Admit Date: 3/13/2020   Discharge Date: 3/15/2020    Hospital Diagnoses:   Principal Problem:    Atrial fibrillation with RVR ([facility]  Active Problems:    Controlled type 2 diabetes mellitus with stage 3 chronic kidney disease, with long-term current use of insulin ([facility])    Paroxysmal atrial fibrillation ([facility]  Resolved Problems:    * No resolved hospital problems. *    Discharge Diagnoses:   Patient Active Problem List   Diagnosis Code    Essential hypertension I10    Hypercholesterolemia E78.00    Controlled type 2 diabetes mellitus with stage 3 chronic kidney disease, with long-term current use of insulin ([facility]) E11.22, N18.3, Z79.4    Pulmonary emphysema ([facility] J43.9    Nonrheumatic aortic valve stenosis I35.0    Squamous cell carcinoma of left lung ([facility]) C34.92    Pneumonia of right middle lobe due to infectious organism ([facility] J18.1    Hypoxia R09.02    Severe malnutrition ([facility]) E43    Atrial fibrillation with RVR ([facility]) I48.91    Paroxysmal atrial fibrillation [facility]) I48.0       Hospital Course:  Please see chart for full details of this hospitalization. Briefly, pt was admitted for atrial fibrillation with rapid ventricular response. Patient has history of lung cancer and now has metastases throughout the right lung and lymph nodes. Patient went for a CT-guided biopsy but in postop was found to have atrial fibrillation with rapid ventricular response. This was paroxysmal atrial fibrillation. He was admitted and started on a Cardizem drip. CT scan showed no pulmonary embolism though did confirm his metastatic disease. Echocardiogram showed a slightly depressed ejection fractionAs well as sclerotic aortic valve and moderate aortic insufficiency and moderate left ventricular hypertrophy. On the Cardizem drip the patient's rate was controlled and he was started on metoprolol with continued control of his rate. Cardizem was discontinued.   Patient was started on Eliquis. He was not cardioverted though potentially this could be done as an outpatient. He will have close follow-up with oncology for his lung cancer. He has a PET scan scheduled for tomorrow and follow-up with oncology later this week. Patient will continue receiving home health care. Physical Exam:  BP (!) 105/54   Pulse 87   Temp 97.5 °F (36.4 °C) (Oral)   Resp 30   Ht 5' 8\" (1.727 m)   Wt 119 lb 4.8 oz (54.1 kg)   SpO2 95%   BMI 18.14 kg/m²   GENERAL: alert and oriented times three; no apparent distress  HEENT: no scleral icterus; conjunctiva pink  PULM: scattered wheezing  CARDIAC: regular rate and rhythm, no murmurs  ABDOMEN: soft, non-tender, non-distended. Bowel sounds normo-active.    EXT: no cyanosis, clubbing, or edema  NEUROLOGICAL:  Grossly nonfocal    Consults: cardiology  Disposition: home with [place] [address]  Condition at Discharge: improved  Follow Up: Dr Lauraine Aschoff this week, Dr Myron Rivera this week; PET scan tomorrow    Discharge Medications:   Graciela Parr   Home Medication Instructions NHZ:[id number]    Printed on:03/15/20 1028   Medication Information                      albuterol sulfate HFA (PROAIR HFA) 108 (90 Base) MCG/ACT inhaler  Inhale 2 puffs into the lungs every 6 hours as needed for Wheezing             apixaban (ELIQUIS) 2.5 MG TABS tablet  Take 1 tablet by mouth 2 times daily             budesonide-formoterol (SYMBICORT) 160-4.5 MCG/ACT AERO  Inhale 2 puffs into the lungs 2 times daily             cetirizine (ZYRTEC) 10 MG tablet  Take 1 tablet by mouth daily             ciprofloxacin (CIPRO) 250 MG tablet  Take 1 tablet by mouth 2 times daily for 10 days             guaiFENesin (MUCINEX) 600 MG extended release tablet  Take 1 tablet by mouth 2 times daily             hydrochlorothiazide (MICROZIDE) 12.5 MG capsule  Take 1 capsule by mouth daily             insulin lispro protamine & lispro (HUMALOG MIX 75/25) (75-25) 100 UNIT per ML SUSP injection vial  Use 30 units twice daily             metoprolol succinate (TOPROL XL) 50 MG extended release tablet  Take 1 tablet by mouth 2 times daily             mirtazapine (REMERON) 15 MG tablet  Take 1 tablet by mouth nightly             omeprazole (PRILOSEC) 20 MG delayed release capsule  Take 1 capsule by mouth daily             saline nasal gel (AYR) GEL  by Nasal route Daily             spironolactone (ALDACTONE) 25 MG tablet  Take 1 tablet by mouth daily             tiotropium (SPIRIVA RESPIMAT) 2.5 MCG/ACT AERS inhaler  Inhale 2 puffs into the lungs daily             traMADol (ULTRAM) 50 MG tablet  Take 50 mg by mouth every 6 hours as needed for Pain.                 Activity: as tolerated  Diet: CCD    Time Spent on discharge is more than 30 minutes discussing plan of care and discharge medications with patient and nursing staff    Signed:  Jg Quiroz   3/15/2020

## 2020-03-15 NOTE — PLAN OF CARE
Problem: SAFETY  Goal: Free from accidental physical injury  3/15/2020 1114 by Esthela De Souza RN  Outcome: Ongoing  3/15/2020 1114 by Esthela De Souza RN  Outcome: Ongoing  3/14/2020 2301 by Bailey Sullivan RN  Outcome: Ongoing     Problem: PAIN  Goal: Patient's pain/discomfort is manageable  3/15/2020 1114 by Esthela De Souza RN  Outcome: Ongoing  3/15/2020 1114 by Esthela De Souza RN  Outcome: Ongoing  3/14/2020 2301 by Bailey Sullivan RN  Outcome: Ongoing     Problem: KNOWLEDGE DEFICIT  Goal: Patient/S.O. demonstrates understanding of disease process, treatment plan, medications, and discharge instructions.   3/15/2020 1114 by Esthela De Souza RN  Outcome: Ongoing  3/15/2020 1114 by Esthela De Souza RN  Outcome: Ongoing  3/14/2020 2301 by Bailey Sullivan RN  Outcome: Ongoing     Problem: Nutrition  Goal: Optimal nutrition therapy  3/15/2020 1114 by Esthela De Souza RN  Outcome: Ongoing  3/15/2020 1114 by Esthela De Souza RN  Outcome: Ongoing  3/14/2020 2301 by Bailey Sullivan RN  Outcome: Ongoing     Problem: Tissue Perfusion - Cardiopulmonary, Altered:  Goal: Absence of angina  Description: Absence of angina  3/15/2020 1114 by Esthela De Souza RN  Outcome: Ongoing  3/15/2020 1114 by Esthela De Souza RN  Outcome: Ongoing  3/14/2020 2301 by Bailey Sullivan RN  Outcome: Ongoing  Goal: Hemodynamic stability will improve  Description: Hemodynamic stability will improve  3/15/2020 1114 by Esthela De Souza RN  Outcome: Ongoing  3/15/2020 1114 by Esthela De Souza RN  Outcome: Ongoing  3/14/2020 2301 by Bailey Sullivan RN  Outcome: Ongoing

## 2020-03-15 NOTE — PLAN OF CARE
Problem: SAFETY  Goal: Free from accidental physical injury  3/14/2020 2301 by Sheri Rob RN  Outcome: Ongoing  3/14/2020 1226 by Fritzi Crigler, RN  Outcome: Ongoing

## 2020-03-15 NOTE — PROGRESS NOTES
Daily Progress Note     awake alert feeling better  Had low sugar this am  Has PVC and low BP  Going home today  PAFIB on AC   Keep on  Toprol 50mg bid and stop ACEI and diuretics  Follow up as outpatient closely   Added amiodarone for PVC  Hx of lung CA with mets  For PET scan tomorrow  LBBB noted at baseline   Conservative treatment from cardiac stand   Overall prognosis remains gaurded    [facility]    Left ventricular systolic function is abnormal.   Ejection fraction is visually estimated at 40-50%.  Septal and apical wall segments appear hypokinetic.   Moderate left ventricular hypertrophy.   Sclerotic aortic valve restricting the non-coronary cusp.   Moderate eccentric aortic insufficiency.   Mild aortic stenosis; mean PmmHg.   No evidence of any pericardial effusion.        The patient had a heart catheterization done back in 2008.  Left main  was patent, LAD had mild disease, circ had mild disease, RCA had mild  disease present.     The patient had last echo done back in 2019.  LV function was  preserved.  Severe LVH was noted.  Moderate aortic stenosis was noted.    Mean gradient was 14 mmHg present.  The patient had a lobectomy done  back in 2018.  Last stress test was done in 2019, negative for  ischemia, and left bundle-branch block was noted, otherwise stable.     The patient had a CT chest done with contrast in 2020.  The patient  had multiple lung nodules present.  The patient had it post treatment of  the left lung.     PAST MEDICAL HISTORY:  The patient has a history of having  nonobstructive coronary artery disease present, history of having left  bundle-branch block present, lung cancer, status post lobectomy done, I  believe on the left side at that time.  History of diabetes,  hypertension, hard of hearing, and history of COPD present.  I believe  he had a right upper lobe lobectomy done back in 2016.     PAST SURGICAL HISTORY:  Tonsillectomy, left upper lobe LIVER PROFILE:   Recent Labs     03/13/20  1245 03/14/20  0530   AST 16 13*   ALT 12 10   BILITOT 0.2 0.2   ALKPHOS 116 106     PT/INR:   Recent Labs     03/13/20  1245 03/14/20  0530   PROTIME 12.8 15.7*   INR 1.06 1.29     APTT:   Recent Labs     03/13/20  1245   APTT 36.6     BNP:  No results for input(s): BNP in the last 72 hours.       Assessment:  Patient Active Problem List    Diagnosis Date Noted    Paroxysmal atrial fibrillation ([address] 03/15/2020    Atrial fibrillation with RVR ([address] 03/13/2020    Severe malnutrition ([address] 03/04/2020    Hypoxia 03/03/2020    Pneumonia of right middle lobe due to infectious organism ([address]     Essential hypertension 10/30/2018    Hypercholesterolemia 10/30/2018    Controlled type 2 diabetes mellitus with stage 3 chronic kidney disease, with long-term current use of insulin ([address] 10/30/2018    Pulmonary emphysema ([address] 10/30/2018    Nonrheumatic aortic valve stenosis 10/30/2018    Squamous cell carcinoma of left lung ([address] 10/30/2018       Ava Chung MD 3/15/2020 12:29 PM

## 2020-03-16 ENCOUNTER — APPOINTMENT (OUTPATIENT)
Dept: CT IMAGING | Age: 85
End: 2020-03-16
Payer: MEDICARE

## 2020-03-16 ENCOUNTER — APPOINTMENT (OUTPATIENT)
Dept: GENERAL RADIOLOGY | Age: 85
End: 2020-03-16
Payer: MEDICARE

## 2020-03-16 ENCOUNTER — TELEPHONE (OUTPATIENT)
Dept: INTERNAL MEDICINE CLINIC | Age: 85
End: 2020-03-16

## 2020-03-16 ENCOUNTER — CARE COORDINATION (OUTPATIENT)
Dept: CASE MANAGEMENT | Age: 85
End: 2020-03-16

## 2020-03-16 ENCOUNTER — HOSPITAL ENCOUNTER (EMERGENCY)
Age: 85
Discharge: HOME | End: 2020-03-16
Attending: EMERGENCY MEDICINE
Payer: MEDICARE

## 2020-03-16 VITALS
RESPIRATION RATE: 19 BRPM | WEIGHT: 124 LBS | SYSTOLIC BLOOD PRESSURE: 132 MMHG | DIASTOLIC BLOOD PRESSURE: 78 MMHG | HEIGHT: 68 IN | HEART RATE: 60 BPM | OXYGEN SATURATION: 96 % | BODY MASS INDEX: 18.79 KG/M2 | TEMPERATURE: 97.8 F

## 2020-03-16 LAB
ALBUMIN SERPL-MCNC: 2.4 GM/DL (ref 3.4–5)
ALP BLD-CCNC: 111 IU/L (ref 40–128)
ALT SERPL-CCNC: 15 U/L (ref 10–40)
ANION GAP SERPL CALCULATED.3IONS-SCNC: 13 MMOL/L (ref 4–16)
AST SERPL-CCNC: 16 IU/L (ref 15–37)
BASOPHILS ABSOLUTE: 0 K/CU MM
BASOPHILS RELATIVE PERCENT: 0.2 % (ref 0–1)
BILIRUB SERPL-MCNC: 0.2 MG/DL (ref 0–1)
BUN BLDV-MCNC: 23 MG/DL (ref 6–23)
CALCIUM SERPL-MCNC: 10 MG/DL (ref 8.3–10.6)
CHLORIDE BLD-SCNC: 102 MMOL/L (ref 99–110)
CHP ED QC CHECK: NORMAL
CO2: 20 MMOL/L (ref 21–32)
CREAT SERPL-MCNC: 1.6 MG/DL (ref 0.9–1.3)
DIFFERENTIAL TYPE: ABNORMAL
EOSINOPHILS ABSOLUTE: 0 K/CU MM
EOSINOPHILS RELATIVE PERCENT: 0.2 % (ref 0–3)
GFR AFRICAN AMERICAN: 50 ML/MIN/1.73M2
GFR NON-AFRICAN AMERICAN: 41 ML/MIN/1.73M2
GLUCOSE BLD-MCNC: 103 MG/DL
GLUCOSE BLD-MCNC: 103 MG/DL (ref 70–99)
GLUCOSE BLD-MCNC: 105 MG/DL (ref 70–99)
GLUCOSE BLD-MCNC: 179 MG/DL
GLUCOSE BLD-MCNC: 179 MG/DL (ref 70–99)
GLUCOSE BLD-MCNC: 202 MG/DL
GLUCOSE BLD-MCNC: 202 MG/DL (ref 70–99)
GLUCOSE BLD-MCNC: 28 MG/DL
GLUCOSE BLD-MCNC: 28 MG/DL (ref 70–99)
GLUCOSE BLD-MCNC: 288 MG/DL
GLUCOSE BLD-MCNC: 288 MG/DL (ref 70–99)
GLUCOSE BLD-MCNC: 343 MG/DL (ref 70–99)
HCT VFR BLD CALC: 49.5 % (ref 42–52)
HEMOGLOBIN: 15.1 GM/DL (ref 13.5–18)
IMMATURE NEUTROPHIL %: 0.5 % (ref 0–0.43)
LYMPHOCYTES ABSOLUTE: 0.8 K/CU MM
LYMPHOCYTES RELATIVE PERCENT: 6.2 % (ref 24–44)
MCH RBC QN AUTO: 28.4 PG (ref 27–31)
MCHC RBC AUTO-ENTMCNC: 30.5 % (ref 32–36)
MCV RBC AUTO: 93 FL (ref 78–100)
MONOCYTES ABSOLUTE: 0.5 K/CU MM
MONOCYTES RELATIVE PERCENT: 3.7 % (ref 0–4)
NUCLEATED RBC %: 0 %
PDW BLD-RTO: 15.7 % (ref 11.7–14.9)
PLATELET # BLD: 357 K/CU MM (ref 140–440)
PMV BLD AUTO: 9.6 FL (ref 7.5–11.1)
POTASSIUM SERPL-SCNC: 4.3 MMOL/L (ref 3.5–5.1)
RBC # BLD: 5.32 M/CU MM (ref 4.6–6.2)
SEGMENTED NEUTROPHILS ABSOLUTE COUNT: 10.9 K/CU MM
SEGMENTED NEUTROPHILS RELATIVE PERCENT: 89.2 % (ref 36–66)
SODIUM BLD-SCNC: 135 MMOL/L (ref 135–145)
TOTAL IMMATURE NEUTOROPHIL: 0.06 K/CU MM
TOTAL NUCLEATED RBC: 0 K/CU MM
TOTAL PROTEIN: 7.3 GM/DL (ref 6.4–8.2)
TROPONIN T: <0.01 NG/ML
WBC # BLD: 12.2 K/CU MM (ref 4–10.5)

## 2020-03-16 PROCEDURE — 84484 ASSAY OF TROPONIN QUANT: CPT

## 2020-03-16 PROCEDURE — 93010 ELECTROCARDIOGRAM REPORT: CPT | Performed by: INTERNAL MEDICINE

## 2020-03-16 PROCEDURE — 71046 X-RAY EXAM CHEST 2 VIEWS: CPT

## 2020-03-16 PROCEDURE — 99285 EMERGENCY DEPT VISIT HI MDM: CPT

## 2020-03-16 PROCEDURE — 80053 COMPREHEN METABOLIC PANEL: CPT

## 2020-03-16 PROCEDURE — 85025 COMPLETE CBC W/AUTO DIFF WBC: CPT

## 2020-03-16 PROCEDURE — 2580000003 HC RX 258

## 2020-03-16 PROCEDURE — 82962 GLUCOSE BLOOD TEST: CPT

## 2020-03-16 PROCEDURE — 36415 COLL VENOUS BLD VENIPUNCTURE: CPT

## 2020-03-16 PROCEDURE — 96372 THER/PROPH/DIAG INJ SC/IM: CPT

## 2020-03-16 PROCEDURE — 2500000003 HC RX 250 WO HCPCS: Performed by: PHYSICIAN ASSISTANT

## 2020-03-16 PROCEDURE — 70450 CT HEAD/BRAIN W/O DYE: CPT

## 2020-03-16 PROCEDURE — 93005 ELECTROCARDIOGRAM TRACING: CPT | Performed by: PHYSICIAN ASSISTANT

## 2020-03-16 RX ORDER — DEXTROSE MONOHYDRATE 25 G/50ML
INJECTION, SOLUTION INTRAVENOUS
Status: COMPLETED
Start: 2020-03-16 | End: 2020-03-16

## 2020-03-16 RX ORDER — DEXTROSE MONOHYDRATE 100 MG/ML
INJECTION, SOLUTION INTRAVENOUS CONTINUOUS
Status: DISCONTINUED | OUTPATIENT
Start: 2020-03-16 | End: 2020-03-16 | Stop reason: HOSPADM

## 2020-03-16 RX ORDER — DEXTROSE MONOHYDRATE 25 G/50ML
50 INJECTION, SOLUTION INTRAVENOUS ONCE
Status: COMPLETED | OUTPATIENT
Start: 2020-03-16 | End: 2020-03-16

## 2020-03-16 RX ADMIN — DEXTROSE 50 % IN WATER (D50W) INTRAVENOUS SYRINGE 50 ML: at 09:26

## 2020-03-16 RX ADMIN — GLUCAGON HYDROCHLORIDE 1 MG: KIT at 09:29

## 2020-03-16 RX ADMIN — DEXTROSE MONOHYDRATE 50 ML: 25 INJECTION, SOLUTION INTRAVENOUS at 09:26

## 2020-03-16 NOTE — ED PROVIDER NOTES
Patient Identification  Pricilla Montgomery is a 80 y.o. male    Chief Complaint  Hypoglycemia (pt scheduled for [facility] scan today at [facility], pt daughter gave pt all his morning meds today at 0400 including 30 units of insulin, pt did not eat due to PET scan prep. Pt became sluggish, weak and unable to speak per daughter who called ems. On arrival per ems blood sugar checked with results of 25 and given 2 tabs of oral glucose. )      HPI  (History provided by patient)  This is a 80 y.o. male who was brought in by self for chief complaint of hypoglycemia. Patient has h/o lung cancer, was scheduled for PET scan this morning and woke up at 0400 and took 30 units insulin and hasn't eaten since then, states was directed to as part of PET scan prep. Patient became lethargic, slurring his speech. Daughter called EMS, when they arrived his glucose was 25, he was given oral glucose, started on D10 infusion. On arrival here glucose is still low at 28. Patient given D50, glucagon, continued D10 infusion. After a short time he is alert and oriented and able discuss his feelings with me. He states he does not remember much from earlier today. Not had any falls. Denies any numbness or weakness. Acting normally now per family. REVIEW OF SYSTEMS    Constitutional:  Denies fever, chills  HENT:  Denies sore throat or ear pain   Eyes: Denies vision changes, eye pain  Cardiovascular:  Denies chest pain, syncope  Respiratory:  Denies shortness of breath, cough   GI:  Denies abdominal pain, nausea, vomiting  :  Denies dysuria, discharge  Musculoskeletal:  Denies back pain, joint pain  Skin:  Denies rash, pruritis  Neurologic:  Denies headache, focal weakness, or sensory changes     See HPI and nursing notes for additional information     I have reviewed the following nursing documentation:  Allergies:    Allergies   Allergen Reactions    Aspirin      \"Made Me Have Bleeding In My Stool\"       Past medical history: has a past medical history of CAD (coronary artery disease), Carcinoma of left lung ([facility] (3/21/2016), COPD (chronic obstructive pulmonary disease) ([facility] [facility] Diabetes mellitus ([facility] [address] (Dx 1970's), Heart murmur, systolic, [place] (hard of hearing), Hyperlipidemia, Hypertension, Left Lung Cancer (Dx 12-31-15), Lung mass, Teeth missing, Thyroid disease, and Wears glasses. Past surgical history:  has a past surgical history that includes Cardiac catheterization; other surgical history (3-4-16); other surgical history (12-31-15 ); Dental surgery; Tonsillectomy (1960's); and Lung surgery (Left, 03/16/2016). Home medications:   Prior to Admission medications    Medication Sig Start Date End Date Taking? Authorizing Provider   insulin lispro protamine & lispro (HUMALOG MIX 75/25) (75-25) 100 UNIT per ML SUSP injection vial Use 25 units twice daily 3/16/20  Yes Vasu Rankin PA-C   metoprolol succinate (TOPROL XL) 50 MG extended release tablet Take 1 tablet by mouth 2 times daily 3/15/20   Adolfo Oneal MD   apixaban Lauren Morenita) 2.5 MG TABS tablet Take 1 tablet by mouth 2 times daily 3/15/20   Adolfo Oneal MD   amiodarone (CORDARONE) 200 MG tablet Take 1 tablet by mouth daily 3/15/20   Christopher Miranda MD   saline nasal gel (AYR) GEL by Nasal route Daily    Historical Provider, MD   ciprofloxacin (CIPRO) 250 MG tablet Take 1 tablet by mouth 2 times daily for 10 days 3/12/20 3/22/20  Adolfo Oneal MD   traMADol (ULTRAM) 50 MG tablet Take 50 mg by mouth every 6 hours as needed for Pain.     Historical Provider, MD [facility]) 600 MG extended release tablet Take 1 tablet by mouth 2 times daily 3/6/20   Adolfo Oneal MD   mirtazapine (REMERON) 15 MG tablet Take 1 tablet by mouth nightly 2/28/20   Adolfo Oneal MD   tiotropium [facility] RESPIMAT) 2.5 MCG/ACT AERS inhaler Inhale 2 puffs into the lungs daily 1/30/20   Adolfo Oneal MD   cetirizine (ZYRTEC) 10 MG Value Ref Range    POC Glucose 343 (H) 70 - 99 MG/DL   EKG 12 Lead   Result Value Ref Range    Ventricular Rate 67 BPM    Atrial Rate 67 BPM    P-R Interval 184 ms    QRS Duration 160 ms    Q-T Interval 474 ms    QTc Calculation (Bazett) 500 ms    P Axis 90 degrees    R Axis 32 degrees    T Axis 74 degrees    Diagnosis       Normal sinus rhythm with sinus arrhythmia  Possible Left atrial enlargement  Left bundle branch block  Abnormal ECG  When compared with ECG of 13-MAR-2020 12:40,  Significant changes have occurred           MDM  Patient presents for altered mental status, found to be hypoglycemic, this is resolved. After being watched for several hours with repeat Accu-Cheks patient's glucoses continue to trend upward. Laboratory testing reveals patient has a mild renal insufficiency slightly worse than previous. EKG is unchanged from previous. Chest x-ray does show increased lung markings and vague opacities in the right hemithorax and left lung base which could be due to mild pulmonary edema infection inflammation or lymphangitic spread of neoplasm. Patient is scheduled for PET scan. Head CT a few days ago which did not show any signs of infection or edema. He has no cough or shortness of breath currently. Discussed results with patient and daughter. Plan is to discharge with outpatient follow-up with PCP. Will decrease insulin dose to 25 units twice daily with food. Discussed with him the importance of eating when he takes his insulin. Likely cause of today's episode is taking large bolus of insulin and then not eating for several hours. Recommended returning to ED for any new or worsening symptoms. Assessment and plan discussed with patient and daughter who understand and agree. I have independently evaluated this patient. Final Impression  1. Hypoglycemia    2. Abnormal CXR    3. Renal insufficiency    4.  Type 2 diabetes mellitus without complication, with long-term current use of insulin (HCC)        Blood pressure 132/78, pulse 60, temperature 97.8 °F (36.6 °C), resp. rate 19, height 5' 8\" (1.727 m), weight 124 lb (56.2 kg), SpO2 96 %. Disposition:  Discharge to home in stable condition. Patient was given scripts for the following medications. I counseled patient how to take these medications. Current Discharge Medication List          This chart was generated using the [address] [place] [address] dictation system. I created this record but it may contain dictation errors given the limitations of this technology.        Paige Martel PA-C  03/16/20 4154

## 2020-03-16 NOTE — ED PROVIDER NOTES
EKG: Normal sinus rhythm sinus arrhythmia, left bundle branch block rate of 67 no ischemic changes no significant change from prior      Westley Troy DO  03/16/20 1049

## 2020-03-16 NOTE — ED NOTES
Jackson,lactic acid and venous ph was drawn on patient  NO BLUE TOP WAS DRAWN     Danielle Eaton  03/16/20 0969

## 2020-03-17 ENCOUNTER — CARE COORDINATION (OUTPATIENT)
Dept: CASE MANAGEMENT | Age: 85
End: 2020-03-17

## 2020-03-17 ENCOUNTER — TELEPHONE (OUTPATIENT)
Dept: INTERNAL MEDICINE CLINIC | Age: 85
End: 2020-03-17

## 2020-03-17 PROCEDURE — 1111F DSCHRG MED/CURRENT MED MERGE: CPT | Performed by: INTERNAL MEDICINE

## 2020-03-17 RX ORDER — CETIRIZINE HYDROCHLORIDE 10 MG/1
10 TABLET ORAL DAILY
Qty: 30 TABLET | Refills: 6 | Status: SHIPPED | OUTPATIENT
Start: 2020-03-17

## 2020-03-17 RX ORDER — BUDESONIDE AND FORMOTEROL FUMARATE DIHYDRATE 160; 4.5 UG/1; UG/1
2 AEROSOL RESPIRATORY (INHALATION) 2 TIMES DAILY
Qty: 1 INHALER | Refills: 6 | Status: SHIPPED | OUTPATIENT
Start: 2020-03-17

## 2020-03-17 NOTE — CARE COORDINATION
[facility] Trans[facility] Initial Follow Up Call    Call within 2 business days of discharge: Yes    Patient: Manfred Estrada Patient : 1935   MRN: [id number]  Reason for Admission:   Hypoglycemia  Discharge Date: 3/16/20 RARS: Readmission Risk Score: 16      Last Discharge [facility]       Complaint Diagnosis Description Type Department Provider    3/16/20 Hypoglycemia Hypoglycemia . .. ED (DISCHARGE) [facility] ED Julia Goodman DO           Spoke with:   Patient's daughter    Facility:   [facility]    Non-face-to-face services provided:  Assessment and support for treatment adherence and medication management-[id number]    Care Transitions 24 Hour Call    Do you have all of your prescriptions and are they filled?:  Yes  Post Acute Services:  Home Health (Comment: [facility])  Do you have support at home?:  Alone  Care Transitions Interventions         Follow Up:  COVID-19 Screening Initial Follow-up Note    Patient contacted regarding COVID-19  Risk  Care Transition Nurse contacted the patient's daughter who is POA by telephone to perform post discharge assessment. Verified name and  with POA  as identifiers. Provided introduction to self, and explanation of the CTN and reason for call due to risk factors for infection and/or exposure to COVID-19. Patient's daughter reports that patient is doing much better today; ate a \"good breakfast\"; blood sugar was \"back up to 200 today'.   Reviewed Diabetes zone management tool and s/s to report to MD.    Symptoms reviewed with patient's daughter  Fever :  No   Fatigue :  Yes    Pain or aching joints :  No  Cough:  No  Shortness of breath :  Yes with exertion (baseline)  Confusion or unusual change in mental status :  No    Chills or shaking :  No   Sweating:  No   Fast heart rate :  No   Fast breathing : No   Dizziness/lightheadedness :  Upon standing at times/ discussed safety/fall prevention   Less urine output :  No   Cold, clammy, and pale skin : No  Low body temperature:  Yes      Patient has a Provider follow up appointment Monday. Is active with [address] [place] [address] Rd and has been contacted for follow up today. Reminded patient of the 24/7 availability of a [place] [id number] nurse in call for any change in patient condition. Patient has following risk factors of: PNA/ Emphysema/ DM.  CTN reviewed discharge instructions, medical action plan and red flags such as increased shortness of breath, increasing fever and signs of decompensation with patient's daughter who verbalized understanding. Discussed exposure protocols and quarantine with CDC Guidelines What to do if you are sick with coronavirus disease 2019 with Patient's daughter who was given an opportunity for questions and concerns. Patient's daughter agrees to contact the [facility] exposure line, local health department and PCP office for questions related to their healthcare. CTN provided contact information for future reference. Reviewed and educated on any new and changed medications related to discharge diagnosis. Medication reconciliation completed. Patient 's daughter requested CTN contact Provider to request refill on Spiriva, Symbicort and Zyrtec. Informed of CTN plan for follow through. Plan for follow-up call in 3-5 days  based on severity of symptoms and risk factors. Message left with Provider's office to update in regard to prescriptions requested. CTN contact information given should questions arise.    Future Appointments   Date Time Provider Juventino Maradiaga   3/19/2020  9:15 AM Mariann Higginbotham MD [facility] MED ONC [place]   3/20/2020 11:15 AM Cora Marrufo MD [facility] E SIM [facility]   3/25/2020  2:30 PM Demetrius Bailey  Serg España RN

## 2020-03-19 ENCOUNTER — HOSPITAL ENCOUNTER (OUTPATIENT)
Dept: INFUSION THERAPY | Age: 85
Discharge: HOME | End: 2020-03-19
Payer: MEDICARE

## 2020-03-19 PROCEDURE — 99211 OFF/OP EST MAY X REQ PHY/QHP: CPT

## 2020-03-20 ENCOUNTER — OFFICE VISIT (OUTPATIENT)
Dept: INTERNAL MEDICINE CLINIC | Age: 85
End: 2020-03-20
Payer: MEDICARE

## 2020-03-20 VITALS
OXYGEN SATURATION: 95 % | RESPIRATION RATE: 18 BRPM | BODY MASS INDEX: 19.46 KG/M2 | WEIGHT: 128 LBS | DIASTOLIC BLOOD PRESSURE: 84 MMHG | SYSTOLIC BLOOD PRESSURE: 134 MMHG | HEART RATE: 93 BPM

## 2020-03-20 PROCEDURE — 99215 OFFICE O/P EST HI 40 MIN: CPT | Performed by: INTERNAL MEDICINE

## 2020-03-20 PROCEDURE — 1111F DSCHRG MED/CURRENT MED MERGE: CPT | Performed by: INTERNAL MEDICINE

## 2020-03-20 RX ORDER — LORAZEPAM 0.5 MG/1
0.5 TABLET ORAL EVERY 8 HOURS PRN
Qty: 30 TABLET | Refills: 0 | Status: SHIPPED | OUTPATIENT
Start: 2020-03-20 | End: 2020-04-19

## 2020-03-20 RX ORDER — TRAMADOL HYDROCHLORIDE 50 MG/1
50 TABLET ORAL EVERY 6 HOURS PRN
Qty: 28 TABLET | Refills: 0 | Status: SHIPPED | OUTPATIENT
Start: 2020-03-20 | End: 2020-03-27

## 2020-03-20 RX ORDER — ESCITALOPRAM OXALATE 5 MG/1
5 TABLET ORAL DAILY
Qty: 30 TABLET | Refills: 5 | Status: SHIPPED | OUTPATIENT
Start: 2020-03-20

## 2020-03-20 NOTE — PROGRESS NOTES
Post-Discharge Transitional Care Management Services or Hospital Follow Up      Ely Rod   YOB: 1935    Date of Office Visit:  3/20/2020  Date of Hospital Admission: 3/16/20  Date of Hospital Discharge: 3/16/20  Risk of hospital readmission (high >=14%.  Medium >=10%) :Readmission Risk Score: 16      Care management risk score Rising risk (score 2-5) and Complex Care (Scores >=6): 7     Non face to face  following discharge, date last encounter closed (first attempt may have been earlier): 3/17/2020 10:22 AM    Call initiated 2 business days of discharge: Yes    Patient Active Problem List   Diagnosis    Essential hypertension    Hypercholesterolemia    Controlled type 2 diabetes mellitus with stage 3 chronic kidney disease, with long-term current use of insulin ([address]    Pulmonary emphysema ([address]    Nonrheumatic aortic valve stenosis    Squamous cell carcinoma of left lung ([address]    Pneumonia of right middle lobe due to infectious organism ([address]    Hypoxia    Severe malnutrition ([address]    Atrial fibrillation with RVR ([address]    Paroxysmal atrial fibrillation (HCC)       Allergies   Allergen Reactions    Aspirin      \"Made Me Have Bleeding In My Stool\"       Medications listed as ordered at the time of discharge from [facility]   Home Medication Instructions SARAH BETH:    Printed on:03/20/20 1222   Medication Information                      albuterol (PROVENTIL) (5 MG/ML) 0.5% nebulizer solution  Take 1 mL by nebulization 4 times daily as needed for Wheezing             albuterol sulfate HFA (PROAIR HFA) 108 (90 Base) MCG/ACT inhaler  Inhale 2 puffs into the lungs every 6 hours as needed for Wheezing             amiodarone (CORDARONE) 200 MG tablet  Take 1 tablet by mouth daily             apixaban (ELIQUIS) 2.5 MG TABS tablet  Take 1 tablet by mouth 2 times daily             budesonide-formoterol (SYMBICORT) 160-4.5 MCG/ACT AERO  Inhale 2 puffs into the lungs 2 times daily 100 to low 200s. He has had occas hypoglycemia    He is increasing his ambulation. He will start chemo and immunotherapy in 2 weeks. A comprehensive review of systems was negative except for what was noted in the HPI. Vitals:    03/20/20 1121   BP: 134/84   Pulse: 93   Resp: 18   SpO2: 95%   Weight: 128 lb (58.1 kg)     Body mass index is 19.46 kg/m². Wt Readings from Last 3 Encounters:   03/20/20 128 lb (58.1 kg)   03/16/20 124 lb (56.2 kg)   03/15/20 119 lb 4.8 oz (54.1 kg)     BP Readings from Last 3 Encounters:   03/20/20 134/84   03/16/20 132/78   03/15/20 (!) 114/53        Physical Exam:  General Appearance: alert and oriented to person, place and time, well developed and well- nourished, in no acute distress  Skin: warm and dry, no rash or erythema  Head: normocephalic and atraumatic  Eyes: pupils equal, round, and reactive to light, extraocular eye movements intact, conjunctivae normal  ENT: tympanic membrane, external ear and ear canal normal bilaterally, nose without deformity, nasal mucosa and turbinates normal without polyps  Neck: supple and non-tender without mass, no thyromegaly or thyroid nodules, no cervical lymphadenopathy  Pulmonary/Chest: clear to auscultation bilaterally- no wheezes, rales or rhonchi, normal air movement, no respiratory distress  Cardiovascular: normal rate, regular rhythm, no murmurs  Abdomen: soft, non-tender, non-distended, normal bowel sounds, no masses or organomegaly  Extremities: no cyanosis, clubbing or edema  Musculoskeletal: normal range of motion, no joint swelling, deformity or tenderness  Neurologic: reflexes normal and symmetric, no cranial nerve deficit, gait, coordination and speech normal    Assessment/Plan:  1.  Type 2 diabetes mellitus without complication, with long-term current use of insulin ([facility]) - decrease insulin to avoid hypoglycemia  - insulin lispro protamine & lispro (HUMALOG MIX 75/25) (75-25) 100 UNIT per ML SUSP injection vial; Use 20 units twice daily  Dispense: 2 vial; Refill: 5  - ID DISCHARGE MEDS RECONCILED W/ CURRENT OUTPATIENT MED LIST    2. Anxiety - will start lexapro, use ativan PRN  - LORazepam (ATIVAN) 0.5 MG tablet; Take 1 tablet by mouth every 8 hours as needed for Anxiety for up to 30 days. Dispense: 30 tablet; Refill: 0  - escitalopram (LEXAPRO) 5 MG tablet; Take 1 tablet by mouth daily  Dispense: 30 tablet; Refill: 5  - ID DISCHARGE MEDS RECONCILED W/ CURRENT OUTPATIENT MED LIST    3. 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) - cont tramadol PRN; to start chemo  - traMADol (ULTRAM) 50 MG tablet; Take 1 tablet by mouth every 6 hours as needed for Pain for up to 7 days. Dispense: 28 tablet;  Refill: 0  - ID DISCHARGE MEDS RECONCILED W/ CURRENT OUTPATIENT MED LIST        Medical Decision Making: high complexity

## 2020-03-23 ENCOUNTER — TELEPHONE (OUTPATIENT)
Dept: INTERNAL MEDICINE CLINIC | Age: 85
End: 2020-03-23

## 2020-03-25 ENCOUNTER — TELEPHONE (OUTPATIENT)
Dept: INTERNAL MEDICINE CLINIC | Age: 85
End: 2020-03-25

## 2020-03-25 LAB
EKG ATRIAL RATE: 67 BPM
EKG DIAGNOSIS: NORMAL
EKG P AXIS: 90 DEGREES
EKG P-R INTERVAL: 184 MS
EKG Q-T INTERVAL: 474 MS
EKG QRS DURATION: 160 MS
EKG QTC CALCULATION (BAZETT): 500 MS
EKG R AXIS: 32 DEGREES
EKG T AXIS: 74 DEGREES
EKG VENTRICULAR RATE: 67 BPM

## 2020-03-25 NOTE — TELEPHONE ENCOUNTER
Patients daughter called in wanting to know if the patient was to receive home care should it be hospice care pr palliative care ?

## 2020-03-27 ENCOUNTER — TELEPHONE (OUTPATIENT)
Dept: INTERNAL MEDICINE CLINIC | Age: 85
End: 2020-03-27

## 2020-03-27 RX ORDER — METOPROLOL SUCCINATE 50 MG/1
50 TABLET, EXTENDED RELEASE ORAL DAILY
Qty: 30 TABLET | Refills: 3
Start: 2020-03-27

## 2020-03-27 NOTE — TELEPHONE ENCOUNTER
Yoselin Faria called states patient's pulse in running in the 40's. States patient is alert and oriented. Please advise. Thank you!

## 2020-03-30 ENCOUNTER — TELEPHONE (OUTPATIENT)
Dept: INTERNAL MEDICINE CLINIC | Age: 85
End: 2020-03-30

## 2020-03-30 ENCOUNTER — CARE COORDINATION (OUTPATIENT)
Dept: CASE MANAGEMENT | Age: 85
End: 2020-03-30

## 2020-03-30 NOTE — TELEPHONE ENCOUNTER
Tigre Weeks was evaluated today and a DME order was entered for a nebulizer compressor in order to administer Albuterol and Ipratropium due to the diagnosis of COPD. The need for this equipment and treatment was discussed with the patient and he understands and is in agreement.

## 2020-03-30 NOTE — TELEPHONE ENCOUNTER
Patients daughter called in stating that the patient called in stating that the patient been having some breathing issues where he can not breathe or catch his breath. He had 3 episodes Friday, 1 Saturday, and 2 episodes yesterday and daughter want you to know about these attacks. Patients daughter wanted you to be aware of this. He could not get up this morning to make it to his appt for his port and patient also is needing depends per daughter.

## 2020-03-30 NOTE — CARE COORDINATION
surfaces.  Avoid all cruise travel and non-essential air travel.  Call your healthcare professional if you have concerns about COVID-19 and your underlying condition or if you are sick. For more information on steps you can take to protect yourself, see CDC's How to Protect Yourself      Note routed to Dr Sergio Ortega re: request for Palliative/Hospice Care.      Follow Up  Future Appointments   Date Time Provider [facility]   4/9/2020  1:00 PM Mahesh Clark MD [address] [place] [address]   4/9/2020  1:15 PM SCHEDULE, [facility] MED ONC LAB [facility] MED ONC [place]   4/21/2020  1:15 PM Elisa Saleem MD [facility]       Liudmila Lovell RN

## 2020-03-30 NOTE — TELEPHONE ENCOUNTER
Pt's daughter called stating they need an order for a nebulizer machine to [address] Please advise.

## 2020-03-30 NOTE — TELEPHONE ENCOUNTER
Spoke with the patients daughter she stated that they have been doing this, she stated the home care nurse is there with them now and they are thinking the patient is just heading to another level of care.

## 2020-04-01 ENCOUNTER — TELEPHONE (OUTPATIENT)
Dept: INTERNAL MEDICINE CLINIC | Age: 85
End: 2020-04-01

## 2020-04-01 RX ORDER — MIRTAZAPINE 15 MG/1
15 TABLET, FILM COATED ORAL NIGHTLY
Qty: 30 TABLET | Refills: 3 | Status: SHIPPED | OUTPATIENT
Start: 2020-04-01

## 2020-04-01 NOTE — TELEPHONE ENCOUNTER
Patients daughter called in stating that she would like all the patients scripts sent to [facility] [address].

## 2020-04-02 ENCOUNTER — CARE COORDINATION (OUTPATIENT)
Dept: CASE MANAGEMENT | Age: 85
End: 2020-04-02

## 2020-04-03 ENCOUNTER — TELEPHONE (OUTPATIENT)
Dept: INTERNAL MEDICINE CLINIC | Age: 85
End: 2020-04-03

## 2020-04-07 ENCOUNTER — TELEPHONE (OUTPATIENT)
Dept: INTERNAL MEDICINE CLINIC | Age: 85
End: 2020-04-07

## 2020-04-07 RX ORDER — HYDROCODONE BITARTRATE AND ACETAMINOPHEN 5; 325 MG/1; MG/1
1 TABLET ORAL EVERY 4 HOURS PRN
Qty: 60 TABLET | Refills: 0 | Status: SHIPPED | OUTPATIENT
Start: 2020-04-07 | End: 2020-05-07